# Patient Record
Sex: MALE | Race: WHITE | NOT HISPANIC OR LATINO
[De-identification: names, ages, dates, MRNs, and addresses within clinical notes are randomized per-mention and may not be internally consistent; named-entity substitution may affect disease eponyms.]

---

## 2017-01-12 ENCOUNTER — APPOINTMENT (OUTPATIENT)
Dept: INTERNAL MEDICINE | Facility: CLINIC | Age: 62
End: 2017-01-12

## 2017-02-20 ENCOUNTER — RX RENEWAL (OUTPATIENT)
Age: 62
End: 2017-02-20

## 2017-04-21 ENCOUNTER — RX RENEWAL (OUTPATIENT)
Age: 62
End: 2017-04-21

## 2017-04-24 ENCOUNTER — APPOINTMENT (OUTPATIENT)
Dept: INTERNAL MEDICINE | Facility: CLINIC | Age: 62
End: 2017-04-24

## 2017-04-24 VITALS
WEIGHT: 194 LBS | SYSTOLIC BLOOD PRESSURE: 130 MMHG | RESPIRATION RATE: 14 BRPM | DIASTOLIC BLOOD PRESSURE: 70 MMHG | HEART RATE: 71 BPM | BODY MASS INDEX: 26.28 KG/M2 | OXYGEN SATURATION: 98 % | HEIGHT: 72 IN

## 2017-04-25 ENCOUNTER — OTHER (OUTPATIENT)
Age: 62
End: 2017-04-25

## 2017-04-25 LAB
ALBUMIN SERPL ELPH-MCNC: 4.7 G/DL
ALP BLD-CCNC: 80 U/L
ALT SERPL-CCNC: 19 U/L
ANION GAP SERPL CALC-SCNC: 15 MMOL/L
AST SERPL-CCNC: 16 U/L
BASOPHILS # BLD AUTO: 0.05 K/UL
BASOPHILS NFR BLD AUTO: 0.4 %
BILIRUB SERPL-MCNC: 0.2 MG/DL
BUN SERPL-MCNC: 24 MG/DL
CALCIUM SERPL-MCNC: 9.7 MG/DL
CHLORIDE SERPL-SCNC: 98 MMOL/L
CHOLEST SERPL-MCNC: 160 MG/DL
CO2 SERPL-SCNC: 27 MMOL/L
CREAT SERPL-MCNC: 0.87 MG/DL
CREAT SPEC-SCNC: 77 MG/DL
EOSINOPHIL # BLD AUTO: 0.55 K/UL
EOSINOPHIL NFR BLD AUTO: 4.7 %
GLUCOSE SERPL-MCNC: 275 MG/DL
HBA1C MFR BLD HPLC: 10.1 %
HCT VFR BLD CALC: 38.8 %
HDLC SERPL-MCNC: 44 MG/DL
HGB BLD-MCNC: 12.7 G/DL
IMM GRANULOCYTES NFR BLD AUTO: 0.3 %
LYMPHOCYTES # BLD AUTO: 3.73 K/UL
LYMPHOCYTES NFR BLD AUTO: 31.9 %
MAN DIFF?: NORMAL
MCHC RBC-ENTMCNC: 26.9 PG
MCHC RBC-ENTMCNC: 32.7 GM/DL
MCV RBC AUTO: 82.2 FL
MICROALBUMIN 24H UR DL<=1MG/L-MCNC: 0.4 MG/DL
MICROALBUMIN/CREAT 24H UR-RTO: 5 UG/MG
MONOCYTES # BLD AUTO: 0.86 K/UL
MONOCYTES NFR BLD AUTO: 7.4 %
NEUTROPHILS # BLD AUTO: 6.47 K/UL
NEUTROPHILS NFR BLD AUTO: 55.3 %
PLATELET # BLD AUTO: 291 K/UL
POTASSIUM SERPL-SCNC: 4.7 MMOL/L
PROT SERPL-MCNC: 7.4 G/DL
RBC # BLD: 4.72 M/UL
RBC # FLD: 14.5 %
SODIUM SERPL-SCNC: 140 MMOL/L
WBC # FLD AUTO: 11.69 K/UL

## 2017-05-24 ENCOUNTER — RX RENEWAL (OUTPATIENT)
Age: 62
End: 2017-05-24

## 2017-06-28 ENCOUNTER — APPOINTMENT (OUTPATIENT)
Dept: GASTROENTEROLOGY | Facility: CLINIC | Age: 62
End: 2017-06-28

## 2017-06-28 VITALS
HEIGHT: 72 IN | BODY MASS INDEX: 26.14 KG/M2 | SYSTOLIC BLOOD PRESSURE: 128 MMHG | DIASTOLIC BLOOD PRESSURE: 82 MMHG | HEART RATE: 83 BPM | OXYGEN SATURATION: 96 % | WEIGHT: 193 LBS

## 2017-06-28 DIAGNOSIS — Z12.12 ENCOUNTER FOR SCREENING FOR MALIGNANT NEOPLASM OF COLON: ICD-10-CM

## 2017-06-28 DIAGNOSIS — Z12.11 ENCOUNTER FOR SCREENING FOR MALIGNANT NEOPLASM OF COLON: ICD-10-CM

## 2017-07-28 ENCOUNTER — RX RENEWAL (OUTPATIENT)
Age: 62
End: 2017-07-28

## 2017-08-10 ENCOUNTER — RX RENEWAL (OUTPATIENT)
Age: 62
End: 2017-08-10

## 2017-08-19 ENCOUNTER — RX RENEWAL (OUTPATIENT)
Age: 62
End: 2017-08-19

## 2017-08-24 ENCOUNTER — RX RENEWAL (OUTPATIENT)
Age: 62
End: 2017-08-24

## 2017-09-12 ENCOUNTER — NON-APPOINTMENT (OUTPATIENT)
Age: 62
End: 2017-09-12

## 2017-09-12 ENCOUNTER — APPOINTMENT (OUTPATIENT)
Dept: INTERNAL MEDICINE | Facility: CLINIC | Age: 62
End: 2017-09-12
Payer: COMMERCIAL

## 2017-09-12 VITALS
RESPIRATION RATE: 14 BRPM | DIASTOLIC BLOOD PRESSURE: 70 MMHG | BODY MASS INDEX: 25.87 KG/M2 | OXYGEN SATURATION: 98 % | SYSTOLIC BLOOD PRESSURE: 124 MMHG | HEART RATE: 104 BPM | WEIGHT: 191 LBS | HEIGHT: 72 IN

## 2017-09-12 DIAGNOSIS — Z11.59 ENCOUNTER FOR SCREENING FOR OTHER VIRAL DISEASES: ICD-10-CM

## 2017-09-12 DIAGNOSIS — F17.200 NICOTINE DEPENDENCE, UNSPECIFIED, UNCOMPLICATED: ICD-10-CM

## 2017-09-12 PROCEDURE — 93000 ELECTROCARDIOGRAM COMPLETE: CPT

## 2017-09-12 PROCEDURE — 99396 PREV VISIT EST AGE 40-64: CPT | Mod: 25

## 2017-09-13 LAB
ALBUMIN SERPL ELPH-MCNC: 4.8 G/DL
ALP BLD-CCNC: 74 U/L
ALT SERPL-CCNC: 22 U/L
ANION GAP SERPL CALC-SCNC: 15 MMOL/L
AST SERPL-CCNC: 25 U/L
BASOPHILS # BLD AUTO: 0.04 K/UL
BASOPHILS NFR BLD AUTO: 0.4 %
BILIRUB SERPL-MCNC: 0.3 MG/DL
BUN SERPL-MCNC: 22 MG/DL
CALCIUM SERPL-MCNC: 10.1 MG/DL
CHLORIDE SERPL-SCNC: 98 MMOL/L
CHOLEST SERPL-MCNC: 132 MG/DL
CO2 SERPL-SCNC: 26 MMOL/L
CREAT SERPL-MCNC: 1.09 MG/DL
EOSINOPHIL # BLD AUTO: 0.18 K/UL
EOSINOPHIL NFR BLD AUTO: 1.6 %
GLUCOSE SERPL-MCNC: 223 MG/DL
HBA1C MFR BLD HPLC: 9 %
HCT VFR BLD CALC: 41.1 %
HCV AB SER QL: NONREACTIVE
HCV S/CO RATIO: 0.12 S/CO
HDLC SERPL-MCNC: 40 MG/DL
HGB BLD-MCNC: 13.3 G/DL
IMM GRANULOCYTES NFR BLD AUTO: 0.3 %
LYMPHOCYTES # BLD AUTO: 2.53 K/UL
LYMPHOCYTES NFR BLD AUTO: 22.3 %
MAN DIFF?: NORMAL
MCHC RBC-ENTMCNC: 26.6 PG
MCHC RBC-ENTMCNC: 32.4 GM/DL
MCV RBC AUTO: 82.2 FL
MONOCYTES # BLD AUTO: 0.62 K/UL
MONOCYTES NFR BLD AUTO: 5.5 %
NEUTROPHILS # BLD AUTO: 7.92 K/UL
NEUTROPHILS NFR BLD AUTO: 69.9 %
PLATELET # BLD AUTO: 325 K/UL
POTASSIUM SERPL-SCNC: 4.9 MMOL/L
PROT SERPL-MCNC: 7.8 G/DL
PSA SERPL-MCNC: 1.06 NG/ML
RBC # BLD: 5 M/UL
RBC # FLD: 14.9 %
SODIUM SERPL-SCNC: 139 MMOL/L
TSH SERPL-ACNC: 2.68 UIU/ML
WBC # FLD AUTO: 11.32 K/UL

## 2017-12-11 ENCOUNTER — OUTPATIENT (OUTPATIENT)
Dept: OUTPATIENT SERVICES | Facility: HOSPITAL | Age: 62
LOS: 1 days | End: 2017-12-11
Payer: COMMERCIAL

## 2017-12-11 ENCOUNTER — RESULT REVIEW (OUTPATIENT)
Age: 62
End: 2017-12-11

## 2017-12-11 ENCOUNTER — TRANSCRIPTION ENCOUNTER (OUTPATIENT)
Age: 62
End: 2017-12-11

## 2017-12-11 ENCOUNTER — APPOINTMENT (OUTPATIENT)
Dept: GASTROENTEROLOGY | Facility: HOSPITAL | Age: 62
End: 2017-12-11

## 2017-12-11 DIAGNOSIS — Z12.11 ENCOUNTER FOR SCREENING FOR MALIGNANT NEOPLASM OF COLON: ICD-10-CM

## 2017-12-11 PROCEDURE — 88305 TISSUE EXAM BY PATHOLOGIST: CPT | Mod: 26

## 2017-12-11 PROCEDURE — 88305 TISSUE EXAM BY PATHOLOGIST: CPT

## 2017-12-11 PROCEDURE — 45380 COLONOSCOPY AND BIOPSY: CPT | Mod: PT

## 2017-12-11 PROCEDURE — 45380 COLONOSCOPY AND BIOPSY: CPT | Mod: 33

## 2017-12-12 LAB — SURGICAL PATHOLOGY FINAL REPORT - CH: SIGNIFICANT CHANGE UP

## 2017-12-29 ENCOUNTER — MEDICATION RENEWAL (OUTPATIENT)
Age: 62
End: 2017-12-29

## 2018-01-08 ENCOUNTER — RX RENEWAL (OUTPATIENT)
Age: 63
End: 2018-01-08

## 2018-01-25 ENCOUNTER — APPOINTMENT (OUTPATIENT)
Dept: DERMATOLOGY | Facility: CLINIC | Age: 63
End: 2018-01-25
Payer: COMMERCIAL

## 2018-01-25 VITALS — DIASTOLIC BLOOD PRESSURE: 66 MMHG | SYSTOLIC BLOOD PRESSURE: 130 MMHG

## 2018-01-25 DIAGNOSIS — L81.4 OTHER MELANIN HYPERPIGMENTATION: ICD-10-CM

## 2018-01-25 DIAGNOSIS — L30.0 NUMMULAR DERMATITIS: ICD-10-CM

## 2018-01-25 PROCEDURE — 99214 OFFICE O/P EST MOD 30 MIN: CPT | Mod: GC

## 2018-01-25 RX ORDER — HALOBETASOL PROPIONATE 0.5 MG/G
0.05 OINTMENT TOPICAL TWICE DAILY
Qty: 1 | Refills: 1 | Status: ACTIVE | COMMUNITY
Start: 2018-01-25 | End: 1900-01-01

## 2018-02-22 ENCOUNTER — NON-APPOINTMENT (OUTPATIENT)
Age: 63
End: 2018-02-22

## 2018-02-22 ENCOUNTER — APPOINTMENT (OUTPATIENT)
Dept: INTERNAL MEDICINE | Facility: CLINIC | Age: 63
End: 2018-02-22
Payer: COMMERCIAL

## 2018-02-22 VITALS
HEIGHT: 72 IN | RESPIRATION RATE: 14 BRPM | OXYGEN SATURATION: 96 % | DIASTOLIC BLOOD PRESSURE: 82 MMHG | BODY MASS INDEX: 27.09 KG/M2 | TEMPERATURE: 98.7 F | HEART RATE: 89 BPM | SYSTOLIC BLOOD PRESSURE: 130 MMHG | WEIGHT: 200 LBS

## 2018-02-22 DIAGNOSIS — Z01.818 ENCOUNTER FOR OTHER PREPROCEDURAL EXAMINATION: ICD-10-CM

## 2018-02-22 PROCEDURE — 93000 ELECTROCARDIOGRAM COMPLETE: CPT

## 2018-02-22 PROCEDURE — 99214 OFFICE O/P EST MOD 30 MIN: CPT | Mod: 25

## 2018-03-07 LAB
ALBUMIN SERPL ELPH-MCNC: 4.3 G/DL
ALP BLD-CCNC: 109 U/L
ALT SERPL-CCNC: 16 U/L
ANION GAP SERPL CALC-SCNC: 15 MMOL/L
AST SERPL-CCNC: 19 U/L
BASOPHILS # BLD AUTO: 0.06 K/UL
BASOPHILS NFR BLD AUTO: 0.6 %
BILIRUB SERPL-MCNC: 0.4 MG/DL
BUN SERPL-MCNC: 18 MG/DL
CALCIUM SERPL-MCNC: 9.9 MG/DL
CHLORIDE SERPL-SCNC: 99 MMOL/L
CHOLEST SERPL-MCNC: 134 MG/DL
CHOLEST/HDLC SERPL: 4.1 RATIO
CO2 SERPL-SCNC: 28 MMOL/L
CREAT SERPL-MCNC: 0.87 MG/DL
EOSINOPHIL # BLD AUTO: 0.39 K/UL
EOSINOPHIL NFR BLD AUTO: 4 %
GLUCOSE SERPL-MCNC: 174 MG/DL
HBA1C MFR BLD HPLC: 10.5 %
HCT VFR BLD CALC: 41.3 %
HDLC SERPL-MCNC: 33 MG/DL
HGB BLD-MCNC: 12.7 G/DL
IMM GRANULOCYTES NFR BLD AUTO: 0.5 %
LDLC SERPL CALC-MCNC: 62 MG/DL
LYMPHOCYTES # BLD AUTO: 2.85 K/UL
LYMPHOCYTES NFR BLD AUTO: 29.5 %
MAN DIFF?: NORMAL
MCHC RBC-ENTMCNC: 25.2 PG
MCHC RBC-ENTMCNC: 30.8 GM/DL
MCV RBC AUTO: 82.1 FL
MONOCYTES # BLD AUTO: 0.9 K/UL
MONOCYTES NFR BLD AUTO: 9.3 %
NEUTROPHILS # BLD AUTO: 5.4 K/UL
NEUTROPHILS NFR BLD AUTO: 56.1 %
PLATELET # BLD AUTO: 320 K/UL
POTASSIUM SERPL-SCNC: 4.8 MMOL/L
PROT SERPL-MCNC: 7.2 G/DL
RBC # BLD: 5.03 M/UL
RBC # FLD: 14 %
SODIUM SERPL-SCNC: 142 MMOL/L
TRIGL SERPL-MCNC: 196 MG/DL
WBC # FLD AUTO: 9.65 K/UL

## 2018-03-28 ENCOUNTER — RX RENEWAL (OUTPATIENT)
Age: 63
End: 2018-03-28

## 2018-06-21 ENCOUNTER — RX RENEWAL (OUTPATIENT)
Age: 63
End: 2018-06-21

## 2018-06-29 ENCOUNTER — RX RENEWAL (OUTPATIENT)
Age: 63
End: 2018-06-29

## 2018-07-09 ENCOUNTER — APPOINTMENT (OUTPATIENT)
Dept: INTERNAL MEDICINE | Facility: CLINIC | Age: 63
End: 2018-07-09

## 2018-07-23 ENCOUNTER — RX RENEWAL (OUTPATIENT)
Age: 63
End: 2018-07-23

## 2018-07-25 ENCOUNTER — RX RENEWAL (OUTPATIENT)
Age: 63
End: 2018-07-25

## 2018-08-09 ENCOUNTER — APPOINTMENT (OUTPATIENT)
Dept: INTERNAL MEDICINE | Facility: CLINIC | Age: 63
End: 2018-08-09
Payer: COMMERCIAL

## 2018-08-09 VITALS
HEIGHT: 72 IN | SYSTOLIC BLOOD PRESSURE: 120 MMHG | DIASTOLIC BLOOD PRESSURE: 80 MMHG | RESPIRATION RATE: 14 BRPM | OXYGEN SATURATION: 98 % | WEIGHT: 186 LBS | TEMPERATURE: 98 F | BODY MASS INDEX: 25.19 KG/M2 | HEART RATE: 81 BPM

## 2018-08-09 PROCEDURE — 99214 OFFICE O/P EST MOD 30 MIN: CPT

## 2018-08-09 NOTE — ASSESSMENT
[FreeTextEntry1] : HTN: Continue ramipril. \par HLD: Continue atorvastatin. \par DM2: Continue current meds. Check labs. \par

## 2018-08-09 NOTE — PHYSICAL EXAM
[No Acute Distress] : no acute distress [Normal Sclera/Conjunctiva] : normal sclera/conjunctiva [PERRL] : pupils equal round and reactive to light [EOMI] : extraocular movements intact [No Respiratory Distress] : no respiratory distress  [Clear to Auscultation] : lungs were clear to auscultation bilaterally [No Accessory Muscle Use] : no accessory muscle use [Normal Rate] : normal rate  [Regular Rhythm] : with a regular rhythm [Normal S1, S2] : normal S1 and S2 [No Murmur] : no murmur heard [Soft] : abdomen soft [Non Tender] : non-tender [Normal Bowel Sounds] : normal bowel sounds [Normal Posterior Cervical Nodes] : no posterior cervical lymphadenopathy [Normal Anterior Cervical Nodes] : no anterior cervical lymphadenopathy

## 2018-08-09 NOTE — REVIEW OF SYSTEMS
[Fever] : no fever [Chills] : no chills [Night Sweats] : no night sweats [Nasal Discharge] : no nasal discharge [Sore Throat] : no sore throat [Chest Pain] : no chest pain [Palpitations] : no palpitations [Lower Ext Edema] : no lower extremity edema [Shortness Of Breath] : no shortness of breath [Wheezing] : no wheezing [Cough] : no cough [Dyspnea on Exertion] : not dyspnea on exertion [Abdominal Pain] : no abdominal pain [Nausea] : no nausea [Constipation] : no constipation [Diarrhea] : no diarrhea [Vomiting] : no vomiting [Dysuria] : no dysuria

## 2018-08-09 NOTE — HISTORY OF PRESENT ILLNESS
[Diabetes Mellitus] : Diabetes Mellitus [Hyperlipidemia] : Hyperlipidemia [Hypertension] : Hypertension [No episodes] : No hypoglycemic episodes since the last visit. [Fasting:  ___] : Fasting Blood Sugar: [unfilled] mg/dL [Most Recent A1C: ___] : Most recent A1C was [unfilled] [Target A1C:  ___] : Target A1C is [unfilled] [Target goal not met] : A1C target goal not met [<130/90] : Target blood pressure is  <130/90 [Target goal met] : BP target goal met [Stable] : Patient is stable [Low Intensity Therapy] : Patient is currently on low intensity statin  therapy

## 2018-08-13 LAB
ALBUMIN SERPL ELPH-MCNC: 4.4 G/DL
ALP BLD-CCNC: 54 U/L
ALT SERPL-CCNC: 19 U/L
ANION GAP SERPL CALC-SCNC: 14 MMOL/L
AST SERPL-CCNC: 26 U/L
BASOPHILS # BLD AUTO: 0.06 K/UL
BASOPHILS NFR BLD AUTO: 0.7 %
BILIRUB SERPL-MCNC: 0.3 MG/DL
BUN SERPL-MCNC: 18 MG/DL
CALCIUM SERPL-MCNC: 9.6 MG/DL
CHLORIDE SERPL-SCNC: 100 MMOL/L
CHOLEST SERPL-MCNC: 108 MG/DL
CHOLEST/HDLC SERPL: 3.2 RATIO
CO2 SERPL-SCNC: 27 MMOL/L
CREAT SERPL-MCNC: 0.75 MG/DL
CREAT SPEC-SCNC: 196 MG/DL
EOSINOPHIL # BLD AUTO: 0.44 K/UL
EOSINOPHIL NFR BLD AUTO: 4.9 %
GLUCOSE SERPL-MCNC: 106 MG/DL
HBA1C MFR BLD HPLC: 8 %
HCT VFR BLD CALC: 41 %
HDLC SERPL-MCNC: 34 MG/DL
HGB BLD-MCNC: 12.8 G/DL
IMM GRANULOCYTES NFR BLD AUTO: 0.1 %
LDLC SERPL CALC-MCNC: 61 MG/DL
LYMPHOCYTES # BLD AUTO: 2.02 K/UL
LYMPHOCYTES NFR BLD AUTO: 22.6 %
MAN DIFF?: NORMAL
MCHC RBC-ENTMCNC: 25.3 PG
MCHC RBC-ENTMCNC: 31.2 GM/DL
MCV RBC AUTO: 81 FL
MICROALBUMIN 24H UR DL<=1MG/L-MCNC: <1.2 MG/DL
MICROALBUMIN/CREAT 24H UR-RTO: NORMAL
MONOCYTES # BLD AUTO: 0.72 K/UL
MONOCYTES NFR BLD AUTO: 8.1 %
NEUTROPHILS # BLD AUTO: 5.68 K/UL
NEUTROPHILS NFR BLD AUTO: 63.6 %
PLATELET # BLD AUTO: 317 K/UL
POTASSIUM SERPL-SCNC: 4.7 MMOL/L
PROT SERPL-MCNC: 7.4 G/DL
RBC # BLD: 5.06 M/UL
RBC # FLD: 14.7 %
SODIUM SERPL-SCNC: 141 MMOL/L
TRIGL SERPL-MCNC: 66 MG/DL
WBC # FLD AUTO: 8.93 K/UL

## 2018-08-25 ENCOUNTER — RX RENEWAL (OUTPATIENT)
Age: 63
End: 2018-08-25

## 2018-08-27 ENCOUNTER — RX RENEWAL (OUTPATIENT)
Age: 63
End: 2018-08-27

## 2018-10-11 ENCOUNTER — RESULT REVIEW (OUTPATIENT)
Age: 63
End: 2018-10-11

## 2018-11-13 ENCOUNTER — RX RENEWAL (OUTPATIENT)
Age: 63
End: 2018-11-13

## 2018-12-27 ENCOUNTER — RX RENEWAL (OUTPATIENT)
Age: 63
End: 2018-12-27

## 2019-01-10 ENCOUNTER — APPOINTMENT (OUTPATIENT)
Dept: INTERNAL MEDICINE | Facility: CLINIC | Age: 64
End: 2019-01-10
Payer: COMMERCIAL

## 2019-01-10 VITALS
OXYGEN SATURATION: 97 % | TEMPERATURE: 98.8 F | RESPIRATION RATE: 14 BRPM | WEIGHT: 195 LBS | HEIGHT: 72 IN | DIASTOLIC BLOOD PRESSURE: 80 MMHG | HEART RATE: 81 BPM | BODY MASS INDEX: 26.41 KG/M2 | SYSTOLIC BLOOD PRESSURE: 140 MMHG

## 2019-01-10 DIAGNOSIS — Z23 ENCOUNTER FOR IMMUNIZATION: ICD-10-CM

## 2019-01-10 PROCEDURE — G0009: CPT

## 2019-01-10 PROCEDURE — 99214 OFFICE O/P EST MOD 30 MIN: CPT | Mod: 25

## 2019-01-10 PROCEDURE — 90670 PCV13 VACCINE IM: CPT

## 2019-01-10 NOTE — ASSESSMENT
[FreeTextEntry1] : Cellulitis: start ceftin BID. \par HTN: Continue ramipril. \par HLD: Continue atrovastatin. \par DM2: Continue lantus, metformin. Check labs. \par

## 2019-01-10 NOTE — REVIEW OF SYSTEMS
[Fever] : no fever [Chills] : no chills [Night Sweats] : no night sweats [Earache] : no earache [Nasal Discharge] : no nasal discharge [Sore Throat] : no sore throat [Chest Pain] : no chest pain [Palpitations] : no palpitations [Lower Ext Edema] : no lower extremity edema [Shortness Of Breath] : no shortness of breath [Wheezing] : no wheezing [Cough] : no cough [Dyspnea on Exertion] : not dyspnea on exertion [Abdominal Pain] : no abdominal pain [Nausea] : no nausea [Constipation] : no constipation [Diarrhea] : no diarrhea [Vomiting] : no vomiting [Dysuria] : no dysuria

## 2019-01-10 NOTE — HISTORY OF PRESENT ILLNESS
[Initial Evaluation] : an initial evaluation of [Skin Pain] : skin pain [Skin Redness] : skin redness [Skin Warmth] : skin warmth [Currently Experiencing] : The patient is currently experiencing symptoms. [Bilateral Abdomen] : abdomen [Gradual] : gradual [___ Day(s) Ago] : [unfilled] day(s) ago [Minor Skin Trauma] : minor skin trauma [Constant] : constantly [Moderate] : moderate in severity [Unchanged] : unchanged [Skin Friction] : skin friction [FreeTextEntry8] : 64M presents for follow-up of HTN, HLD, DM2 and for rash for 10 days. PHI for rash documented below. \par  [Fever] : no fever [Chills] : no chills [Serosanguineous Drainage] : no serosanguineous drainage [Purulent Drainage] : no purulent drainage [Localized Edema] : no localized edema [Fatigue] : no fatigue

## 2019-01-14 LAB
ALBUMIN SERPL ELPH-MCNC: 4.4 G/DL
ALP BLD-CCNC: 93 U/L
ALT SERPL-CCNC: 17 U/L
ANION GAP SERPL CALC-SCNC: 12 MMOL/L
AST SERPL-CCNC: 15 U/L
BASOPHILS # BLD AUTO: 0.07 K/UL
BASOPHILS NFR BLD AUTO: 0.6 %
BILIRUB SERPL-MCNC: <0.2 MG/DL
BUN SERPL-MCNC: 14 MG/DL
CALCIUM SERPL-MCNC: 9.7 MG/DL
CHLORIDE SERPL-SCNC: 99 MMOL/L
CHOLEST SERPL-MCNC: 117 MG/DL
CHOLEST/HDLC SERPL: 3.2 RATIO
CO2 SERPL-SCNC: 26 MMOL/L
CREAT SERPL-MCNC: 0.81 MG/DL
CREAT SPEC-SCNC: 82 MG/DL
EOSINOPHIL # BLD AUTO: 0.46 K/UL
EOSINOPHIL NFR BLD AUTO: 3.8 %
GLUCOSE SERPL-MCNC: 243 MG/DL
HBA1C MFR BLD HPLC: 10.5 %
HCT VFR BLD CALC: 41.5 %
HDLC SERPL-MCNC: 37 MG/DL
HGB BLD-MCNC: 13 G/DL
IMM GRANULOCYTES NFR BLD AUTO: 0.4 %
LDLC SERPL CALC-MCNC: 66 MG/DL
LYMPHOCYTES # BLD AUTO: 2.29 K/UL
LYMPHOCYTES NFR BLD AUTO: 18.9 %
MAN DIFF?: NORMAL
MCHC RBC-ENTMCNC: 25.6 PG
MCHC RBC-ENTMCNC: 31.3 GM/DL
MCV RBC AUTO: 81.9 FL
MICROALBUMIN 24H UR DL<=1MG/L-MCNC: 1.7 MG/DL
MICROALBUMIN/CREAT 24H UR-RTO: 21 MG/G
MONOCYTES # BLD AUTO: 1.06 K/UL
MONOCYTES NFR BLD AUTO: 8.7 %
NEUTROPHILS # BLD AUTO: 8.21 K/UL
NEUTROPHILS NFR BLD AUTO: 67.6 %
PLATELET # BLD AUTO: 348 K/UL
POTASSIUM SERPL-SCNC: 4.6 MMOL/L
PROT SERPL-MCNC: 7.6 G/DL
RBC # BLD: 5.07 M/UL
RBC # FLD: 14 %
SODIUM SERPL-SCNC: 137 MMOL/L
TRIGL SERPL-MCNC: 72 MG/DL
WBC # FLD AUTO: 12.14 K/UL

## 2019-01-16 ENCOUNTER — MEDICATION RENEWAL (OUTPATIENT)
Age: 64
End: 2019-01-16

## 2019-01-24 ENCOUNTER — APPOINTMENT (OUTPATIENT)
Dept: INTERNAL MEDICINE | Facility: CLINIC | Age: 64
End: 2019-01-24

## 2019-03-25 ENCOUNTER — MEDICATION RENEWAL (OUTPATIENT)
Age: 64
End: 2019-03-25

## 2019-03-26 ENCOUNTER — APPOINTMENT (OUTPATIENT)
Dept: INTERNAL MEDICINE | Facility: CLINIC | Age: 64
End: 2019-03-26
Payer: MEDICARE

## 2019-03-26 VITALS
DIASTOLIC BLOOD PRESSURE: 78 MMHG | HEART RATE: 64 BPM | WEIGHT: 194 LBS | HEIGHT: 72 IN | SYSTOLIC BLOOD PRESSURE: 142 MMHG | OXYGEN SATURATION: 96 % | BODY MASS INDEX: 26.28 KG/M2 | TEMPERATURE: 98.8 F | RESPIRATION RATE: 14 BRPM

## 2019-03-26 DIAGNOSIS — L03.311 CELLULITIS OF ABDOMINAL WALL: ICD-10-CM

## 2019-03-26 PROCEDURE — 99214 OFFICE O/P EST MOD 30 MIN: CPT

## 2019-03-26 RX ORDER — CEFUROXIME AXETIL 500 MG/1
500 TABLET ORAL
Qty: 14 | Refills: 0 | Status: COMPLETED | COMMUNITY
Start: 2019-01-10 | End: 2019-03-26

## 2019-03-26 NOTE — ASSESSMENT
[FreeTextEntry1] : HLD: Cont atorvastatin. Check lipids. \par DM2: Continue onglyza, metformin, lantus. Check a1c. \par HTN: Continue ramipril.

## 2019-03-26 NOTE — HISTORY OF PRESENT ILLNESS
[Diabetes Mellitus] : Diabetes Mellitus [Hyperlipidemia] : Hyperlipidemia [Hypertension] : Hypertension [Patient was last seen on ___] : Patient was last seen on [unfilled] [FreeTextEntry6] : Cellulitis resolved. [No episodes] : No hypoglycemic episodes since the last visit. [Fasting:  ___] : Fasting Blood Sugar: [unfilled] mg/dL [] : sometimes in range [Most Recent A1C: ___] : Most recent A1C was [unfilled] [Target A1C:  ___] : Target A1C is [unfilled] [<130/90] : Target blood pressure is  <130/90 [Near target goal] : BP near target goal [Stable] : Patient is stable [Low Intensity Therapy] : Patient is currently on low intensity statin  therapy

## 2019-03-28 ENCOUNTER — MEDICATION RENEWAL (OUTPATIENT)
Age: 64
End: 2019-03-28

## 2019-03-28 LAB
ALBUMIN SERPL ELPH-MCNC: 4.6 G/DL
ALP BLD-CCNC: 82 U/L
ALT SERPL-CCNC: 19 U/L
ANION GAP SERPL CALC-SCNC: 14 MMOL/L
AST SERPL-CCNC: 17 U/L
BASOPHILS # BLD AUTO: 0.1 K/UL
BASOPHILS NFR BLD AUTO: 0.9 %
BILIRUB SERPL-MCNC: 0.2 MG/DL
BUN SERPL-MCNC: 17 MG/DL
CALCIUM SERPL-MCNC: 9.7 MG/DL
CHLORIDE SERPL-SCNC: 96 MMOL/L
CO2 SERPL-SCNC: 28 MMOL/L
CREAT SERPL-MCNC: 0.7 MG/DL
EOSINOPHIL # BLD AUTO: 0.34 K/UL
EOSINOPHIL NFR BLD AUTO: 3.1 %
ESTIMATED AVERAGE GLUCOSE: 237 MG/DL
GLUCOSE SERPL-MCNC: 196 MG/DL
HBA1C MFR BLD HPLC: 9.9 %
HCT VFR BLD CALC: 39.9 %
HGB BLD-MCNC: 12.4 G/DL
IMM GRANULOCYTES NFR BLD AUTO: 0.5 %
LYMPHOCYTES # BLD AUTO: 2.31 K/UL
LYMPHOCYTES NFR BLD AUTO: 20.8 %
MAN DIFF?: NORMAL
MCHC RBC-ENTMCNC: 25.4 PG
MCHC RBC-ENTMCNC: 31.1 GM/DL
MCV RBC AUTO: 81.6 FL
MONOCYTES # BLD AUTO: 0.77 K/UL
MONOCYTES NFR BLD AUTO: 6.9 %
NEUTROPHILS # BLD AUTO: 7.51 K/UL
NEUTROPHILS NFR BLD AUTO: 67.8 %
PLATELET # BLD AUTO: 327 K/UL
POTASSIUM SERPL-SCNC: 4.6 MMOL/L
PROT SERPL-MCNC: 7.4 G/DL
RBC # BLD: 4.89 M/UL
RBC # FLD: 13.7 %
SODIUM SERPL-SCNC: 138 MMOL/L
WBC # FLD AUTO: 11.08 K/UL

## 2019-05-20 ENCOUNTER — RX RENEWAL (OUTPATIENT)
Age: 64
End: 2019-05-20

## 2019-05-20 LAB
BASOPHILS # BLD AUTO: 0.08 K/UL
BASOPHILS NFR BLD AUTO: 0.7 %
EOSINOPHIL # BLD AUTO: 0.3 K/UL
EOSINOPHIL NFR BLD AUTO: 2.8 %
FERRITIN SERPL-MCNC: 57 NG/ML
FOLATE SERPL-MCNC: >20 NG/ML
HCT VFR BLD CALC: 40.6 %
HGB BLD-MCNC: 12.5 G/DL
IMM GRANULOCYTES NFR BLD AUTO: 0.5 %
IRON SATN MFR SERPL: 14 %
IRON SERPL-MCNC: 57 UG/DL
LYMPHOCYTES # BLD AUTO: 2.34 K/UL
LYMPHOCYTES NFR BLD AUTO: 21.8 %
MAN DIFF?: NORMAL
MCHC RBC-ENTMCNC: 25.9 PG
MCHC RBC-ENTMCNC: 30.8 GM/DL
MCV RBC AUTO: 84.1 FL
MONOCYTES # BLD AUTO: 0.77 K/UL
MONOCYTES NFR BLD AUTO: 7.2 %
NEUTROPHILS # BLD AUTO: 7.21 K/UL
NEUTROPHILS NFR BLD AUTO: 67 %
PLATELET # BLD AUTO: 315 K/UL
RBC # BLD: 4.83 M/UL
RBC # FLD: 14.3 %
TIBC SERPL-MCNC: 399 UG/DL
UIBC SERPL-MCNC: 342 UG/DL
VIT B12 SERPL-MCNC: 613 PG/ML
WBC # FLD AUTO: 10.75 K/UL

## 2019-06-03 ENCOUNTER — RX RENEWAL (OUTPATIENT)
Age: 64
End: 2019-06-03

## 2019-08-18 ENCOUNTER — TRANSCRIPTION ENCOUNTER (OUTPATIENT)
Age: 64
End: 2019-08-18

## 2019-09-20 ENCOUNTER — RX RENEWAL (OUTPATIENT)
Age: 64
End: 2019-09-20

## 2019-09-21 ENCOUNTER — MEDICATION RENEWAL (OUTPATIENT)
Age: 64
End: 2019-09-21

## 2019-10-28 ENCOUNTER — RX RENEWAL (OUTPATIENT)
Age: 64
End: 2019-10-28

## 2019-10-30 ENCOUNTER — MEDICATION RENEWAL (OUTPATIENT)
Age: 64
End: 2019-10-30

## 2019-11-18 ENCOUNTER — APPOINTMENT (OUTPATIENT)
Dept: INTERNAL MEDICINE | Facility: CLINIC | Age: 64
End: 2019-11-18
Payer: MEDICARE

## 2019-11-18 VITALS
TEMPERATURE: 98 F | SYSTOLIC BLOOD PRESSURE: 140 MMHG | DIASTOLIC BLOOD PRESSURE: 80 MMHG | RESPIRATION RATE: 14 BRPM | HEIGHT: 72 IN | HEART RATE: 74 BPM | OXYGEN SATURATION: 96 %

## 2019-11-18 DIAGNOSIS — Z12.2 ENCOUNTER FOR SCREENING FOR MALIGNANT NEOPLASM OF RESPIRATORY ORGANS: ICD-10-CM

## 2019-11-18 PROCEDURE — G0438: CPT

## 2019-11-18 PROCEDURE — 93000 ELECTROCARDIOGRAM COMPLETE: CPT

## 2019-11-18 PROCEDURE — G0403: CPT

## 2019-11-18 PROCEDURE — G0402 INITIAL PREVENTIVE EXAM: CPT

## 2019-11-18 PROCEDURE — 36415 COLL VENOUS BLD VENIPUNCTURE: CPT

## 2019-11-19 NOTE — REVIEW OF SYSTEMS
[Fever] : no fever [Chills] : no chills [Discharge] : no discharge [Night Sweats] : no night sweats [Vision Problems] : no vision problems [Itching] : no itching [Earache] : no earache [Nasal Discharge] : no nasal discharge [Sore Throat] : no sore throat [Palpitations] : no palpitations [Chest Pain] : no chest pain [Shortness Of Breath] : no shortness of breath [Lower Ext Edema] : no lower extremity edema [Wheezing] : no wheezing [Dyspnea on Exertion] : not dyspnea on exertion [Cough] : no cough [Constipation] : no constipation [Abdominal Pain] : no abdominal pain [Nausea] : no nausea [Vomiting] : no vomiting [Diarrhea] : no diarrhea [Dysuria] : no dysuria [Muscle Weakness] : no muscle weakness [Muscle Pain] : no muscle pain [Mole Changes] : no mole changes [Headache] : no headache [Skin Rash] : no skin rash [Dizziness] : no dizziness [Confusion] : no confusion [Suicidal] : not suicidal [Anxiety] : no anxiety [Depression] : no depression [Easy Bleeding] : no easy bleeding [Easy Bruising] : no easy bruising [Swollen Glands] : no swollen glands

## 2019-11-19 NOTE — HEALTH RISK ASSESSMENT
[Good] : ~his/her~ current health as good [Yes] : Yes [Monthly or less (1 pt)] : Monthly or less (1 point) [1 or 2 (0 pts)] : 1 or 2 (0 points) [Never (0 pts)] : Never (0 points) [0] : 2) Feeling down, depressed, or hopeless: Not at all (0) [No Retinopathy] : No retinopathy [Fully functional (bathing, dressing, toileting, transferring, walking, feeding)] : Fully functional (bathing, dressing, toileting, transferring, walking, feeding) [] :  [Fully functional (using the telephone, shopping, preparing meals, housekeeping, doing laundry, using] : Fully functional and needs no help or supervision to perform IADLs (using the telephone, shopping, preparing meals, housekeeping, doing laundry, using transportation, managing medications and managing finances) [Carbon Monoxide Detector] : carbon monoxide detector [Smoke Detector] : smoke detector [Seat Belt] :  uses seat belt [With Patient/Caregiver] : With Patient/Caregiver [Name: ___] : Health Care Proxy's Name: [unfilled]  [Designated Healthcare Proxy] : Designated healthcare proxy [Relationship: ___] : Relationship: [unfilled] [Aggressive treatment] : aggressive treatment [] : No [[id number]] : 0 [EyeExamDate] : 05/19 [Reports changes in hearing] : Reports no changes in hearing [Change in mental status noted] : No change in mental status noted [Reports changes in vision] : Reports no changes in vision [AdvancecareDate] : 11/19

## 2019-11-19 NOTE — ASSESSMENT
[FreeTextEntry1] : HTN, HLD: Continue atorvastatin, ramipril. \par DM2: On onglyza, lantus, metformin. Check A1c, microalb/cr. \par HCM: Up to date on pneumonia vaccine, colonoscopy. Check labs including PSA. Check CT lung ca screening. \par

## 2019-11-19 NOTE — PHYSICAL EXAM
[Well Nourished] : well nourished [No Acute Distress] : no acute distress [Well Developed] : well developed [Well-Appearing] : well-appearing [Normal Sclera/Conjunctiva] : normal sclera/conjunctiva [PERRL] : pupils equal round and reactive to light [EOMI] : extraocular movements intact [Normal Outer Ear/Nose] : the outer ears and nose were normal in appearance [Normal Oropharynx] : the oropharynx was normal [No Lymphadenopathy] : no lymphadenopathy [Supple] : supple [Thyroid Normal, No Nodules] : the thyroid was normal and there were no nodules present [No Respiratory Distress] : no respiratory distress  [No Accessory Muscle Use] : no accessory muscle use [Clear to Auscultation] : lungs were clear to auscultation bilaterally [Normal Rate] : normal rate  [Regular Rhythm] : with a regular rhythm [Normal S1, S2] : normal S1 and S2 [No Murmur] : no murmur heard [No Edema] : there was no peripheral edema [Soft] : abdomen soft [Non Tender] : non-tender [Non-distended] : non-distended [Normal Bowel Sounds] : normal bowel sounds [Normal Posterior Cervical Nodes] : no posterior cervical lymphadenopathy [Normal Anterior Cervical Nodes] : no anterior cervical lymphadenopathy [No CVA Tenderness] : no CVA  tenderness [No Spinal Tenderness] : no spinal tenderness [No Joint Swelling] : no joint swelling [Grossly Normal Strength/Tone] : grossly normal strength/tone [No Rash] : no rash [Coordination Grossly Intact] : coordination grossly intact [No Focal Deficits] : no focal deficits [Normal Gait] : normal gait [Deep Tendon Reflexes (DTR)] : deep tendon reflexes were 2+ and symmetric [Normal Affect] : the affect was normal [Normal Insight/Judgement] : insight and judgment were intact

## 2019-11-25 LAB
ALBUMIN SERPL ELPH-MCNC: 4.8 G/DL
ALP BLD-CCNC: 75 U/L
ALT SERPL-CCNC: 16 U/L
ANION GAP SERPL CALC-SCNC: 15 MMOL/L
AST SERPL-CCNC: 15 U/L
BASOPHILS # BLD AUTO: 0.07 K/UL
BASOPHILS NFR BLD AUTO: 0.6 %
BILIRUB SERPL-MCNC: 0.2 MG/DL
BUN SERPL-MCNC: 12 MG/DL
CALCIUM SERPL-MCNC: 9.7 MG/DL
CHLORIDE SERPL-SCNC: 95 MMOL/L
CHOLEST SERPL-MCNC: 119 MG/DL
CHOLEST/HDLC SERPL: 3.1 RATIO
CO2 SERPL-SCNC: 28 MMOL/L
CREAT SERPL-MCNC: 0.68 MG/DL
CREAT SPEC-SCNC: 92 MG/DL
EOSINOPHIL # BLD AUTO: 0.35 K/UL
EOSINOPHIL NFR BLD AUTO: 3.1 %
ESTIMATED AVERAGE GLUCOSE: 263 MG/DL
FERRITIN SERPL-MCNC: 43 NG/ML
FOLATE SERPL-MCNC: >20 NG/ML
GLUCOSE SERPL-MCNC: 188 MG/DL
HBA1C MFR BLD HPLC: 10.8 %
HCT VFR BLD CALC: 43.1 %
HDLC SERPL-MCNC: 38 MG/DL
HGB BLD-MCNC: 13.3 G/DL
IMM GRANULOCYTES NFR BLD AUTO: 0.4 %
IRON SATN MFR SERPL: 11 %
IRON SERPL-MCNC: 41 UG/DL
LDLC SERPL CALC-MCNC: 63 MG/DL
LYMPHOCYTES # BLD AUTO: 3.15 K/UL
LYMPHOCYTES NFR BLD AUTO: 27.6 %
MAN DIFF?: NORMAL
MCHC RBC-ENTMCNC: 25.5 PG
MCHC RBC-ENTMCNC: 30.9 GM/DL
MCV RBC AUTO: 82.7 FL
MICROALBUMIN 24H UR DL<=1MG/L-MCNC: 2 MG/DL
MICROALBUMIN/CREAT 24H UR-RTO: 22 MG/G
MONOCYTES # BLD AUTO: 0.9 K/UL
MONOCYTES NFR BLD AUTO: 7.9 %
NEUTROPHILS # BLD AUTO: 6.9 K/UL
NEUTROPHILS NFR BLD AUTO: 60.4 %
PLATELET # BLD AUTO: 289 K/UL
POTASSIUM SERPL-SCNC: 4.5 MMOL/L
PROT SERPL-MCNC: 7.4 G/DL
PSA SERPL-MCNC: 0.87 NG/ML
RBC # BLD: 5.21 M/UL
RBC # FLD: 14.1 %
SODIUM SERPL-SCNC: 138 MMOL/L
TIBC SERPL-MCNC: 381 UG/DL
TRIGL SERPL-MCNC: 88 MG/DL
TSH SERPL-ACNC: 2.94 UIU/ML
UIBC SERPL-MCNC: 340 UG/DL
VIT B12 SERPL-MCNC: 678 PG/ML
WBC # FLD AUTO: 11.41 K/UL

## 2019-12-03 ENCOUNTER — FORM ENCOUNTER (OUTPATIENT)
Age: 64
End: 2019-12-03

## 2019-12-04 ENCOUNTER — OUTPATIENT (OUTPATIENT)
Dept: OUTPATIENT SERVICES | Facility: HOSPITAL | Age: 64
LOS: 1 days | End: 2019-12-04
Payer: MEDICARE

## 2019-12-04 ENCOUNTER — APPOINTMENT (OUTPATIENT)
Dept: CT IMAGING | Facility: CLINIC | Age: 64
End: 2019-12-04
Payer: MEDICARE

## 2019-12-04 VITALS — WEIGHT: 190 LBS | BODY MASS INDEX: 25.73 KG/M2 | HEIGHT: 72 IN

## 2019-12-04 DIAGNOSIS — Z12.2 ENCOUNTER FOR SCREENING FOR MALIGNANT NEOPLASM OF RESPIRATORY ORGANS: ICD-10-CM

## 2019-12-04 PROCEDURE — G0296 VISIT TO DETERM LDCT ELIG: CPT

## 2019-12-04 PROCEDURE — G0297: CPT | Mod: 26

## 2019-12-04 PROCEDURE — G0297: CPT

## 2019-12-04 NOTE — PLAN
[FreeTextEntry1] : - Low Dose CT chest for lung cancer screening.\par \par - Encouraged continued smoking abstinence\par \par Engaged in share decision making with Mr. GONSALEZ.  Answered all questions.  He verbalized understanding and agreement.  He knows to call back with any questions or concerns.\par

## 2019-12-04 NOTE — HISTORY OF PRESENT ILLNESS
[TextBox_13] : Referred by Dr. Jefferson Bruno.\par \par Mr. GONSALEZ is a 64 year old male with a history of HTN and high cholesterol.\par \par He was seen in the office today for review of eligibility for, as well as, Low-Dose CT lung cancer screening.  Reviewed and confirmed that the patient meets screening eligibility criteria:\par \par 64 years old \par \par Smoking Status: Former smoker\par \par Number of pack(s) per day: 1\par Number of years smoked: 40\par Number of pack years smokin\par \par Number of years since quitting smokin\par Quit year: \par \par Mr. GONSALEZ denies any symptoms of lung cancer, including new cough, change in cough, hemoptysis, and unintentional weight loss.\par \par Mr. GONSALEZ denies any personal history of lung cancer.  No lung cancer in a first degree relative.  Denies any history of lung disease or any history of occupational exposures.

## 2019-12-04 NOTE — REASON FOR VISIT
[Review of Eligibility] : review of eligibility [Initial Evaluation] : an initial evaluation visit [Face-to-Face Visit] : face-to-face visit

## 2019-12-10 ENCOUNTER — MEDICATION RENEWAL (OUTPATIENT)
Age: 64
End: 2019-12-10

## 2019-12-27 ENCOUNTER — APPOINTMENT (OUTPATIENT)
Dept: THORACIC SURGERY | Facility: CLINIC | Age: 64
End: 2019-12-27
Payer: MEDICARE

## 2019-12-27 VITALS
RESPIRATION RATE: 15 BRPM | TEMPERATURE: 97.9 F | HEART RATE: 80 BPM | WEIGHT: 190 LBS | HEIGHT: 72 IN | BODY MASS INDEX: 25.73 KG/M2 | OXYGEN SATURATION: 99 % | SYSTOLIC BLOOD PRESSURE: 173 MMHG | DIASTOLIC BLOOD PRESSURE: 82 MMHG

## 2019-12-27 DIAGNOSIS — Z87.891 PERSONAL HISTORY OF NICOTINE DEPENDENCE: ICD-10-CM

## 2019-12-27 PROCEDURE — 99205 OFFICE O/P NEW HI 60 MIN: CPT

## 2019-12-27 RX ORDER — SODIUM SULFATE, POTASSIUM SULFATE, MAGNESIUM SULFATE 17.5; 3.13; 1.6 G/ML; G/ML; G/ML
17.5-3.13-1.6 SOLUTION, CONCENTRATE ORAL
Qty: 1 | Refills: 0 | Status: DISCONTINUED | COMMUNITY
Start: 2017-06-28 | End: 2019-12-27

## 2019-12-27 RX ORDER — PEN NEEDLE, DIABETIC 29 G X1/2"
31G X 8 MM NEEDLE, DISPOSABLE MISCELLANEOUS
Qty: 1 | Refills: 1 | Status: DISCONTINUED | COMMUNITY
Start: 2018-08-25 | End: 2019-12-27

## 2019-12-27 NOTE — HISTORY OF PRESENT ILLNESS
[FreeTextEntry1] : Ms. Rico is a 64 year old male who presents today for evaluation of lung mass, referred by PCP Dr. Bruno.  He is a former smoker (40 years, 1-2 PPD, Quit 2013) with a history of HTN, HLD, DM.  He recently underwent routine CT Chest as part of a lung cancer screening due to smoking history, no prior scans available for comparison. PET scan is scheduled on Sunday 12/29/2019.\par \par CT Chest on 12/4/19 reveals:\par - Dominant lobulated RML 1 x 1.2 cm nodule\par - MYLA mixed solid and groundglass opacity, 1 cm\par - MYLA 4 mm peripheral nodule\par - RUL 2 mm nodule\par - RUL 4 mm nodule\par - No enlarged axillary, mediastinal or hilar lymph nodes\par \par He denies chest pain, shortness of breath, cough, hemoptysis, fever, palpitations, syncope, URI or recent illness.\par \par \par

## 2019-12-27 NOTE — CONSULT LETTER
[Consult Letter:] : I had the pleasure of evaluating your patient, [unfilled]. [Please see my note below.] : Please see my note below. [Consult Closing:] : Thank you very much for allowing me to participate in the care of this patient.  If you have any questions, please do not hesitate to contact me. [Sincerely,] : Sincerely, [Dear  ___] : Dear  [unfilled], [FreeTextEntry2] : Dr. Jefferson Bruno (PCP/ref)\par  [FreeTextEntry3] : \par \par \par Cody Paz MD, FACS \par Chief, Division of Thoracic Surgery \par Director, Minimally Invasive Thoracic Surgery \par Department of Cardiovascular and Thoracic Surgery \par [facility] \par , Cardiovascular and Thoracic Surgery

## 2019-12-27 NOTE — ASSESSMENT
[FreeTextEntry1] : 64 year old male who presents today for evaluation of lung mass, referred by PCP Dr. Bruno.    He recently underwent routine CT Chest as part of a lung cancer screening due to smoking history, no prior scans available for comparison. PET scan is scheduled on Sunday 12/29/2019.\par \par CT Chest on 12/4/19 reveals:\par - Dominant lobulated RML 1 x 1.2 cm nodule\par - MYLA mixed solid and groundglass opacity, 1 cm\par - MYLA 4 mm peripheral nodule\par - RUL 2 mm nodule\par - RUL 4 mm nodule\par - No enlarged axillary, mediastinal or hilar lymph nodes\par \par I have reviewed the patient's medical records and diagnostic images during the time of this office visit, and I have made the following recommendation: \par 1. Complete PFT's prior to surgery.\par 2. Cardiac Clearance \par 3. Schedule Flex Bronchoscopy,  Right VATS , Lung resection first . Following this 3-4 weeks later schedule Flex Bronchoscopy , Left VATS , Lung resection. The risks, benefits, and alternatives to the procedure and expectations were discussed with the patient at length. All of the patient's questions were answered . He  verbalized understanding, and are in agreement with the above treatment plan.\par 4. Obtain the old films and scans if available.\par \par I personally performed the services described in the documentation, reviewed the documentation recorded by the scribe in my presence and it accurately and completely records my words and actions.\par \par I Kayla Bear RN am scribing for and the presence of Dr. Cody Paz   the following sections , History of present illness , Past medical / surgical /  Family /social history ; review of systems; Vital signs; Physical examination and disposition. “\par \par \par

## 2019-12-27 NOTE — PHYSICAL EXAM
[General Appearance - Alert] : alert [General Appearance - In No Acute Distress] : in no acute distress [Strabismus] : no strabismus was seen [Sclera] : the sclera and conjunctiva were normal [Outer Ear] : the ears and nose were normal in appearance [Hearing Threshold Finger Rub [place]] : hearing was normal [Neck Appearance] : the appearance of the neck was normal [Jugular Venous Distention Increased] : there was no jugular-venous distention [] : no respiratory distress [Respiration, Rhythm And Depth] : normal respiratory rhythm and effort [Auscultation Breath Sounds / Voice Sounds] : lungs were clear to auscultation bilaterally [Heart Rate And Rhythm] : heart rate was normal and rhythm regular [Murmurs] : no murmurs [Heart Sounds] : normal S1 and S2 [Examination Of The Chest] : the chest was normal in appearance [2+] : left 2+ [No Abnormalities] : the abdominal aorta was not enlarged and no bruit was heard [Bowel Sounds] : normal bowel sounds [Abdomen Soft] : soft [Abdomen Tenderness] : non-tender [Cervical Lymph Nodes Enlarged Anterior Bilaterally] : anterior cervical [Cervical Lymph Nodes Enlarged Posterior Bilaterally] : posterior cervical [Supraclavicular Lymph Nodes Enlarged Bilaterally] : supraclavicular [Abnormal Walk] : normal gait [Skin Color & Pigmentation] : normal skin color and pigmentation [No Focal Deficits] : no focal deficits [Skin Turgor] : normal skin turgor [Oriented To Time, Place, And Person] : oriented to person, place, and time [Impaired Insight] : insight and judgment were intact [Right Carotid Bruit] : no bruit heard over the right carotid [Left Carotid Bruit] : no bruit heard over the left carotid [Right Femoral Bruit] : no bruit heard over the right femoral artery [Left Femoral Bruit] : no bruit heard over the left femoral artery

## 2019-12-27 NOTE — REVIEW OF SYSTEMS
[Loss Of Hearing] : hearing loss [Constipation] : constipation [Dry Skin] : dry skin [Negative] : Heme/Lymph [FreeTextEntry4] : Left ear. [FreeTextEntry2] : C/o Poor appetite couple of weeks ago now resolved. [de-identified] : H/o DM Type II

## 2019-12-30 ENCOUNTER — FORM ENCOUNTER (OUTPATIENT)
Age: 64
End: 2019-12-30

## 2019-12-30 ENCOUNTER — MEDICATION RENEWAL (OUTPATIENT)
Age: 64
End: 2019-12-30

## 2019-12-30 ENCOUNTER — RX RENEWAL (OUTPATIENT)
Age: 64
End: 2019-12-30

## 2019-12-30 RX ORDER — LANCETS 30 GAUGE
EACH MISCELLANEOUS
Qty: 1 | Refills: 1 | Status: ACTIVE | COMMUNITY
Start: 2018-08-09 | End: 1900-01-01

## 2019-12-31 ENCOUNTER — OUTPATIENT (OUTPATIENT)
Dept: OUTPATIENT SERVICES | Facility: HOSPITAL | Age: 64
LOS: 1 days | End: 2019-12-31
Payer: MEDICARE

## 2019-12-31 ENCOUNTER — APPOINTMENT (OUTPATIENT)
Dept: NUCLEAR MEDICINE | Facility: CLINIC | Age: 64
End: 2019-12-31
Payer: MEDICARE

## 2019-12-31 DIAGNOSIS — R91.8 OTHER NONSPECIFIC ABNORMAL FINDING OF LUNG FIELD: ICD-10-CM

## 2019-12-31 PROCEDURE — 78815 PET IMAGE W/CT SKULL-THIGH: CPT

## 2019-12-31 PROCEDURE — A9552: CPT

## 2019-12-31 PROCEDURE — 78815 PET IMAGE W/CT SKULL-THIGH: CPT | Mod: 26,PI

## 2020-01-02 ENCOUNTER — TRANSCRIPTION ENCOUNTER (OUTPATIENT)
Age: 65
End: 2020-01-02

## 2020-01-03 ENCOUNTER — APPOINTMENT (OUTPATIENT)
Dept: CARDIOLOGY | Facility: CLINIC | Age: 65
End: 2020-01-03

## 2020-01-04 ENCOUNTER — APPOINTMENT (OUTPATIENT)
Dept: INTERNAL MEDICINE | Facility: CLINIC | Age: 65
End: 2020-01-04
Payer: MEDICARE

## 2020-01-04 VITALS
RESPIRATION RATE: 14 BRPM | HEART RATE: 80 BPM | SYSTOLIC BLOOD PRESSURE: 134 MMHG | OXYGEN SATURATION: 97 % | TEMPERATURE: 98.2 F | DIASTOLIC BLOOD PRESSURE: 80 MMHG | WEIGHT: 194 LBS | BODY MASS INDEX: 26.31 KG/M2

## 2020-01-04 DIAGNOSIS — Z01.818 ENCOUNTER FOR OTHER PREPROCEDURAL EXAMINATION: ICD-10-CM

## 2020-01-04 PROCEDURE — 99214 OFFICE O/P EST MOD 30 MIN: CPT

## 2020-01-04 NOTE — REVIEW OF SYSTEMS
[Fever] : no fever [Chills] : no chills [Night Sweats] : no night sweats [Itching] : no itching [Vision Problems] : no vision problems [Earache] : no earache [Discharge] : no discharge [Nasal Discharge] : no nasal discharge [Sore Throat] : no sore throat [Lower Ext Edema] : no lower extremity edema [Chest Pain] : no chest pain [Palpitations] : no palpitations [Shortness Of Breath] : no shortness of breath [Wheezing] : no wheezing [Cough] : no cough [Abdominal Pain] : no abdominal pain [Dyspnea on Exertion] : not dyspnea on exertion [Constipation] : no constipation [Nausea] : no nausea [Diarrhea] : no diarrhea [Muscle Weakness] : no muscle weakness [Muscle Pain] : no muscle pain [Dysuria] : no dysuria [Vomiting] : no vomiting [Mole Changes] : no mole changes [Skin Rash] : no skin rash [Easy Bleeding] : no easy bleeding [Confusion] : no confusion [Headache] : no headache [Dizziness] : no dizziness [Easy Bruising] : no easy bruising [Swollen Glands] : no swollen glands

## 2020-01-04 NOTE — HISTORY OF PRESENT ILLNESS
[(Patient denies any chest pain, claudication, dyspnea on exertion, orthopnea, palpitations or syncope)] : Patient denies any chest pain, claudication, dyspnea on exertion, orthopnea, palpitations or syncope [Diabetes] : diabetes [Anti-Platelet Agents: _____] : Anti-Platelet Agents: [unfilled] [Aortic Stenosis] : no aortic stenosis [Atrial Fibrillation] : no atrial fibrillation [Coronary Artery Disease] : no coronary artery disease [Recent Myocardial Infarction] : no recent myocardial infarction [Asthma] : no asthma [Implantable Device/Pacemaker] : no implantable device/pacemaker [COPD] : no COPD [Sleep Apnea] : no sleep apnea [Smoker] : not a smoker [FreeTextEntry3] : Dr Paz [FreeTextEntry2] : 1/2019 [FreeTextEntry1] : Right VATS, lung resection

## 2020-01-04 NOTE — ASSESSMENT
[Prior Cerebrovascular Accident or TIA] : Prior Cerebrovascular Accident or TIA - No (0) [Congestive Heart Failure] : Congestive Heart Failure - No (0) [High Risk Surgery - Intraperitoneal, Intrathoracic or Supringuinal Vascular Procedures] : High Risk Surgery - Intraperitoneal, Intrathoracic or Supringuinal Vascular Procedures - Yes (1) [Ischemic Heart Disease] : Ischemic Heart Disease - No (0) [Creatinine >= 2mg/dL (1 Point)] : Creatinine >= 2mg/dL - No (0) [Insulin-dependent Diabetic (1 point)] : Insulin-dependent Diabetic - Yes (1) [Cardiology consultation] : Cardiology consultation [Patient Requires Further Testing] : Patient requires further testing [2] : 2 , RCRI Class: III, Risk of Post-Op Cardiac Complications: 6.6%, Procedure Risk: Elevated-Risk [Other: _____] : [unfilled] [Modify medications prior to procedure] : Modify medications prior to procedure [As per surgery] : as per surgery [FreeTextEntry7] : hold diabetes medication morning of procedure and take half dose of lantus night before.

## 2020-01-04 NOTE — PLAN
[FreeTextEntry1] : Discussed thyroid US and following up with Dr Dye for possible colonoscopy for PET/CT findings. \par

## 2020-01-04 NOTE — PHYSICAL EXAM
[No Acute Distress] : no acute distress [Normal Sclera/Conjunctiva] : normal sclera/conjunctiva [PERRL] : pupils equal round and reactive to light [EOMI] : extraocular movements intact [No Lymphadenopathy] : no lymphadenopathy [Normal Outer Ear/Nose] : the outer ears and nose were normal in appearance [Normal Oropharynx] : the oropharynx was normal [No Respiratory Distress] : no respiratory distress  [No Accessory Muscle Use] : no accessory muscle use [Supple] : supple [Normal Rate] : normal rate  [Clear to Auscultation] : lungs were clear to auscultation bilaterally [Regular Rhythm] : with a regular rhythm [Soft] : abdomen soft [Normal S1, S2] : normal S1 and S2 [No Edema] : there was no peripheral edema [Normal Bowel Sounds] : normal bowel sounds [Non-distended] : non-distended [Non Tender] : non-tender [No Spinal Tenderness] : no spinal tenderness [Normal Anterior Cervical Nodes] : no anterior cervical lymphadenopathy [Normal Posterior Cervical Nodes] : no posterior cervical lymphadenopathy [No Joint Swelling] : no joint swelling [No Rash] : no rash [Grossly Normal Strength/Tone] : grossly normal strength/tone [Normal Affect] : the affect was normal [Normal Insight/Judgement] : insight and judgment were intact [Normal Gait] : normal gait

## 2020-01-05 ENCOUNTER — FORM ENCOUNTER (OUTPATIENT)
Age: 65
End: 2020-01-05

## 2020-01-06 ENCOUNTER — APPOINTMENT (OUTPATIENT)
Dept: ULTRASOUND IMAGING | Facility: CLINIC | Age: 65
End: 2020-01-06
Payer: MEDICARE

## 2020-01-06 ENCOUNTER — OUTPATIENT (OUTPATIENT)
Dept: OUTPATIENT SERVICES | Facility: HOSPITAL | Age: 65
LOS: 1 days | End: 2020-01-06
Payer: MEDICARE

## 2020-01-06 DIAGNOSIS — E04.1 NONTOXIC SINGLE THYROID NODULE: ICD-10-CM

## 2020-01-06 PROCEDURE — 76536 US EXAM OF HEAD AND NECK: CPT

## 2020-01-06 PROCEDURE — 76536 US EXAM OF HEAD AND NECK: CPT | Mod: 26

## 2020-01-10 ENCOUNTER — OUTPATIENT (OUTPATIENT)
Dept: OUTPATIENT SERVICES | Facility: HOSPITAL | Age: 65
LOS: 1 days | Discharge: HOME | End: 2020-01-10

## 2020-01-10 DIAGNOSIS — C34.90 MALIGNANT NEOPLASM OF UNSPECIFIED PART OF UNSPECIFIED BRONCHUS OR LUNG: ICD-10-CM

## 2020-01-15 ENCOUNTER — APPOINTMENT (OUTPATIENT)
Dept: CARDIOLOGY | Facility: CLINIC | Age: 65
End: 2020-01-15

## 2020-01-16 ENCOUNTER — APPOINTMENT (OUTPATIENT)
Dept: PULMONOLOGY | Facility: CLINIC | Age: 65
End: 2020-01-16

## 2020-01-17 ENCOUNTER — APPOINTMENT (OUTPATIENT)
Dept: HEMATOLOGY ONCOLOGY | Facility: CLINIC | Age: 65
End: 2020-01-17

## 2020-02-20 ENCOUNTER — APPOINTMENT (OUTPATIENT)
Dept: ENDOCRINOLOGY | Facility: CLINIC | Age: 65
End: 2020-02-20
Payer: MEDICARE

## 2020-02-20 VITALS
DIASTOLIC BLOOD PRESSURE: 79 MMHG | HEART RATE: 80 BPM | HEIGHT: 72 IN | SYSTOLIC BLOOD PRESSURE: 168 MMHG | WEIGHT: 195 LBS | BODY MASS INDEX: 26.41 KG/M2 | OXYGEN SATURATION: 95 %

## 2020-02-20 DIAGNOSIS — Z83.3 FAMILY HISTORY OF DIABETES MELLITUS: ICD-10-CM

## 2020-02-20 DIAGNOSIS — Z87.891 PERSONAL HISTORY OF NICOTINE DEPENDENCE: ICD-10-CM

## 2020-02-20 LAB
GLUCOSE BLDC GLUCOMTR-MCNC: 210
HBA1C MFR BLD HPLC: 11.1

## 2020-02-20 PROCEDURE — 83036 HEMOGLOBIN GLYCOSYLATED A1C: CPT | Mod: QW

## 2020-02-20 PROCEDURE — 99205 OFFICE O/P NEW HI 60 MIN: CPT | Mod: 25

## 2020-02-20 PROCEDURE — 36415 COLL VENOUS BLD VENIPUNCTURE: CPT

## 2020-02-20 PROCEDURE — 82962 GLUCOSE BLOOD TEST: CPT

## 2020-02-20 RX ORDER — ALOGLIPTIN 25 MG/1
25 TABLET, FILM COATED ORAL DAILY
Qty: 90 | Refills: 1 | Status: DISCONTINUED | COMMUNITY
Start: 2020-02-13 | End: 2020-02-20

## 2020-02-23 ENCOUNTER — APPOINTMENT (OUTPATIENT)
Dept: ULTRASOUND IMAGING | Facility: CLINIC | Age: 65
End: 2020-02-23

## 2020-02-24 NOTE — HISTORY OF PRESENT ILLNESS
"         Child Psychiatry Note    Reason for Admission: Bradycardia and loss of consciousness   Reason for Consult: Ingestion of Klonopin, SI  Requesting Physician: Dr. Reilly  Supervising Psychiatric Attending: Dr. Arango  Guardian: Parent (mom)  Chief Complaint: \"going downhill since mom went to group home\"    HPI :   Pt is a 14 yo male who presented to the ED after having multiple syncopal episodes yesterday in context of smoking 10 joints and taking a klonopin.  Pt was inconsistent when reporting how much Klonopin he took, he told us he took only 0.25 mg and told others he took 1 mg.  Pt reported that this was not a suicidal attempt and wasn't sure what was going on.  Pt reported that things have been going downhill since his mom went to long-term over the last 3 months.  He has felt increasingly depressed and down, has had difficulty sleeping (not sleeping enough), is feeling hopeless about the future, has difficulty concentrating, decreased appetite, and suicidal thoughts.  Pt reports that he has frequently looked at knives and thought about slitting his wrists or what it would be like if he jumped off a building.  This morning when discussing with the nurse about how he couldn't have any visitors at the bedside besides mom, he stated \"I wish I would have taken 45 pills.\"  He reports that he doesn't plan to act on any of these issues but is thinking about it frequently and doesn't care if he dies.      Since his mom has been gone he has lost about 50 pounds and says he doesn't have an appetite at all.  He has not been attending school for the last month and had previously gotten in trouble at school by yelling at teachers, flipping over desks, etc.  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  He reports he has started to heavily use drugs since mom when to group home, smoking multiple joints daily, drinking up to a half gallon of " "vodka, using mushrooms 5 or 6 times.  He reports he uses drugs as an escape when he can't take how upset or sad he is feeling.  When mom was out of shelter on Jan 9, 2018, he got into an argument with her and ran away for a week.  He began living with mom again and feels safe at home, but doesn't wan to open up to mom.  A few days ago pt was diagnosed with Pneumonia and started on antibiotics.      On Psychiatric review of symptoms, pt reported that in the past he has gone 7 days without needing much sleep, during this time he gets irritable more then normal, has increased goal directed activities (working on multiple projects), and feels very distractible.   He also has had 2 episodes of having AH where he hears a voice talking back to him when but was using marijuana during this week.  No VH.  Is at times paranoid when walking to friends house, will feel like someone is watching him, but again was using marijuana.  Denies OCD symptoms.  Does report a history of trauma in regards to learning of friends who have shot himself, no physical or sexual abuse.  Pt denies purposefully restricting calories or purging.      Objective:  Blood pressure (!) 96/45, pulse (!) 44, temperature 36.4 °C (97.6 °F), resp. rate 16, height 1.727 m (5' 8\"), weight 69.8 kg (153 lb 14.1 oz), SpO2 99 %.      MSE :   Appearance: 16 yo thin male, blond hair spiked up and somewhat unkempt, in hospital attire with some light appearing tattoos or markings over right hand   Behavior: calm, cooperative, pleasant, good eye contact and realtively engaged.  He had some mild psychomotor retardation and was moving slowly at times during interview.     Speech : speech is clear and understandable, decreased rate, decreased volume and monotone   Language: normal vocabulary   Mood: \"down\"   Affect: dysthymic, irritable and mood congruent, at times yelling at mom about frustrations.     Thought Process: moslty linear, not goal directed   Thought Content: + SI " "with specific thoughts of how to end life but no specific current plan.  No HI. No current AH    Attention/Concentration: appeared intact, able to do well in conversation and remember events in life well that he was willing to share   Memory: no gross deficits   Orientation: oriented to person, place, situation   Neurological: degerred   Fund of Knowledge: appropriate given developmental stage   Insight/Judgment: poor / poor    Past Psych Hx:    Pt has seen a school psychologist  3 times in the last couple months.  Has not seen her in over a month as he has been skipping school.    No hospitalizations  No suicide attempts  No past medications                                  Family Psych Hx:   Mom has depression and substance use disorder with Methamphetamine.  Is currently clean per mom.  Dad has been diagnosed with Bipolar disorder and schizophrenia     Social Hx:   Was living with grandpa for 1 month till mom got out of [facility].  Ran away for 7 days, but began to again start living with mom. He feels safe at home and has needs met.  Denies history of abuse and reports childhood was pretty good.  Dad has not been in life.   Going to [employer] in [place], [place]. Was previously doing okay in school but started to fail classes when mom went to [facility].  Police came on a party and pt is not sure if he has charges but may have a court date in March.  See HPI for substance use history.      Drugs/Alcohol Hx:   See HPI.                          MEDICAL HX: I have reviewed all the vitals, labs, notes, records, and the MAR. Findings of possible interest to psychiatry include:   ECG on 1/19 showed sinus bradychardia with qtc of 395.    Blood pressure (!) 96/45, pulse (!) 44, temperature 36.3 °C (97.4 °F), resp. rate 16, height 1.727 m (5' 8\"), weight 69.8 kg (153 lb 14.1 oz), SpO2 99 %.      Medical Hx: Pneumonia 3 days ago, put on Azithromycin and Augmentin  Denies other medical problems  Denies TBI  Denies Hx of " "seizures      Medications:   Current Facility-Administered Medications:   •  0.9 % NaCl with KCl 20 mEq infusion, , Intravenous, Continuous, Lisa Reilly M.D.    Allergies: Ibuprofen and Nsaids    Labs reviewed: No labs available    Imaging: none  Tests (EEG): none    Medical ROS: having little appetite, otherwise medical review of systems was negative      Assessment/Plan:  Pt is a 16 yo male who was experiencing syncope and bradycardia.  Pt took an unknown amount of Klonopin and had smoked 10 joints yesterday morning.  Mom reports he had multiple syncopal episodes and was unconscious for about 15 minutes while waiting for ambulance.  He reports having severe depression over last three months since his mom was put in skilled nursing and has frequent suicidal thoughts, picturing himself cutting his wrists and jumping off a building. Currently is experiencing a major depressive episode, severe with significant risk factors for care for self and safety.  Pt has lost 50 lbs over the last 3 months and has not been attending school for one month.  Pt denies that this was a suicide attempt, however, his story has been inconsistent for how much Klonopin he took and told a nurse when frustrated, \"I wish I would have taken 45 pills.\"   He reports a history of hypomanic symptoms in the past and has a family history of Bipolar Disorder vs Schizophrenia.  He has been using marijuana heavily and multiple other substances including mushrooms in an attempt to escape from difficult emotions and his life situation.  He has not received any psychiatric medications and doesn't have established psychiatric care.  Pt is acutely at high risk for suicide, has severe depressive symptoms, is not able to care for self (loosing 50 lbs and not attending school).  Recommend further psychiatric treatment.      DDX:  - Bipolar II, Current episode depressed  - Unspecified trauma related disorder (rule out PTSD)  - Marijuana use disorder, unspecified " severity    Plan:  - Recommend transfer to acute inpatient psychiatric facility for further treatment  - Defer to [facility] for evaluation and management of psychiatric medications  - Discussed with parents the recommendation for outpatient therapy and psychiatric follow up once pt is discharged from acute psychiatric hospital  - Recommend substance abuse treatment    Medical: as noted under Medical Hx and by Medical Team     Disposition:     Pt needs further treatment and will be transferred to an acute psychiatric hospital   Signing off, reconsult as needed   Thank you for the consult.                                                                                .   [FreeTextEntry1] : Mr. STANLEY GONSALEZ  is a 65 year old male with past medical history of Diabetes Mellitus Type 2,  who presents for management of his diabetes. Patients wife states that he has been non-compliant with diet outside of home for years. He was recently thought to have a lung mass but went for second opinion in [facility] where they found that he has a hamartoma. HE also has a finding of a thyroid nodule on PET scan. He has had high sugars for a very long time. Patient denies any history of diabetic retinopathy, nephropathy or neuropathy. He denies any blurry vision, polyuria, polydipsia and numbness/tingling in the extremities.\par \par \par POCT Glucose: 210 mg/dL\par POCT Hga1c: 11.1 %\par \par \par Diabetes first diagnosed: years ago\par Type:2\par \par Current diabetic regimen:\par Lantus 40 units QHS\par Metformin 2000 mg BID\par Onglyza 5 mg QD\par \par Other relevant medications:\par atorvastatin\par \par Pt does not check his fsg\par \par Hypoglycemia: none\par \par \par Diet:\par Breakfast:milk and cookie\par Lunch:sandwiches \par Dinner:meat, veggies,  starches\par Snacks:candy, coffee with sweetener\par \par Exercise: none\par \par Urine micro:enl  11/2019\par lipid profile: ldl 63  11/2019\par last hba1c:11.1% 2/2020, 10.8% 11/2019\par eye exam: yearly\par diabetic foot exam/podiatry:NA\par \par \par \par \par

## 2020-02-24 NOTE — ASSESSMENT
[FreeTextEntry1] : 1. DM 2- uncontrolled, uncomplicated\par Patient counseled on the importance of diabetic control and complications. Patient's diet and the necessary changes discussed. Patient needs to change his diet. He needs to check his blood sugars. He also needs prandial insulin. His sugars are too high and non-insulin medications will not work.\par \par \par Cont Metformin 1000 mg BID\par Cont Lantus 40 units QHS\par Start HUmalog 5 units TID\par Check fsg QID\par Cont diabetic diet\par Cont exercise\par will check hga1c, microalbumin/cre, lipid, bmp, \par \par 2. HLD- stable\par Well controlled on atorvastatin\par \par 3. Thyroid nodule\par Check Thyroid US\par \par Follow up in 2 weeks\par \par \par

## 2020-02-24 NOTE — CONSULT LETTER
[Dear  ___] : Dear  [unfilled], [Consult Letter:] : I had the pleasure of evaluating your patient, [unfilled]. [Please see my note below.] : Please see my note below. [Consult Closing:] : Thank you very much for allowing me to participate in the care of this patient.  If you have any questions, please do not hesitate to contact me. [Sincerely,] : Sincerely, [FreeTextEntry3] : Michelle Miranda MS. DO.\par Endocrinology\par [address] [place] [address]\[place] [address]\par Tel [phone]\par Fax [phone]\par

## 2020-02-24 NOTE — PHYSICAL EXAM
[Alert] : alert [No Acute Distress] : no acute distress [Well Nourished] : well nourished [Well Developed] : well developed [EOMI] : extra ocular movement intact [Normal Sclera/Conjunctiva] : normal sclera/conjunctiva [No Proptosis] : no proptosis [Normal Oropharynx] : the oropharynx was normal [Thyroid Not Enlarged] : the thyroid was not enlarged [No Respiratory Distress] : no respiratory distress [No Thyroid Nodules] : there were no palpable thyroid nodules [Clear to Auscultation] : lungs were clear to auscultation bilaterally [No Accessory Muscle Use] : no accessory muscle use [Normal Rate] : heart rate was normal  [Normal S1, S2] : normal S1 and S2 [Regular Rhythm] : with a regular rhythm [Pedal Pulses Normal] : the pedal pulses are present [No Edema] : there was no peripheral edema [Normal Bowel Sounds] : normal bowel sounds [Not Tender] : non-tender [Soft] : abdomen soft [Not Distended] : not distended [Post Cervical Nodes] : posterior cervical nodes [Anterior Cervical Nodes] : anterior cervical nodes [Axillary Nodes] : axillary nodes [No Spinal Tenderness] : no spinal tenderness [Spine Straight] : spine straight [No Stigmata of Cushings Syndrome] : no stigmata of cushings syndrome [Normal Gait] : normal gait [Normal Strength/Tone] : muscle strength and tone were normal [No Rash] : no rash [Normal Reflexes] : deep tendon reflexes were 2+ and symmetric [No Tremors] : no tremors [Oriented x3] : oriented to person, place, and time [Right Foot Was Examined] : right foot ~C was examined [Left Foot Was Examined] : left foot ~C was examined [Normal] : normal [2+] : 2+ in the dorsalis pedis [Acanthosis Nigricans] : no acanthosis nigricans [Diminished Throughout Both Feet] : normal tactile sensation with monofilament testing throughout both feet

## 2020-02-25 LAB
ALBUMIN SERPL ELPH-MCNC: 4.9 G/DL
ALP BLD-CCNC: 83 U/L
ALT SERPL-CCNC: 16 U/L
ANION GAP SERPL CALC-SCNC: 12 MMOL/L
AST SERPL-CCNC: 14 U/L
BASOPHILS # BLD AUTO: 0.08 K/UL
BASOPHILS NFR BLD AUTO: 0.8 %
BILIRUB SERPL-MCNC: 0.2 MG/DL
BUN SERPL-MCNC: 13 MG/DL
C PEPTIDE SERPL-MCNC: 2 NG/ML
CALCIUM SERPL-MCNC: 9.5 MG/DL
CHLORIDE SERPL-SCNC: 99 MMOL/L
CHOLEST SERPL-MCNC: 127 MG/DL
CHOLEST/HDLC SERPL: 3.8 RATIO
CO2 SERPL-SCNC: 28 MMOL/L
CREAT SERPL-MCNC: 0.62 MG/DL
CREAT SPEC-SCNC: 110 MG/DL
EOSINOPHIL # BLD AUTO: 0.32 K/UL
EOSINOPHIL NFR BLD AUTO: 3.3 %
ESTIMATED AVERAGE GLUCOSE: 275 MG/DL
GLUCOSE SERPL-MCNC: 230 MG/DL
HBA1C MFR BLD HPLC: 11.2 %
HCT VFR BLD CALC: 42.6 %
HDLC SERPL-MCNC: 34 MG/DL
HGB BLD-MCNC: 13.5 G/DL
IMM GRANULOCYTES NFR BLD AUTO: 0.4 %
LDLC SERPL CALC-MCNC: 60 MG/DL
LYMPHOCYTES # BLD AUTO: 2.71 K/UL
LYMPHOCYTES NFR BLD AUTO: 27.8 %
MAN DIFF?: NORMAL
MCHC RBC-ENTMCNC: 25.9 PG
MCHC RBC-ENTMCNC: 31.7 GM/DL
MCV RBC AUTO: 81.8 FL
MICROALBUMIN 24H UR DL<=1MG/L-MCNC: 2.1 MG/DL
MICROALBUMIN/CREAT 24H UR-RTO: 19 MG/G
MONOCYTES # BLD AUTO: 0.71 K/UL
MONOCYTES NFR BLD AUTO: 7.3 %
NEUTROPHILS # BLD AUTO: 5.9 K/UL
NEUTROPHILS NFR BLD AUTO: 60.4 %
PLATELET # BLD AUTO: 333 K/UL
POTASSIUM SERPL-SCNC: 4.7 MMOL/L
PROT SERPL-MCNC: 7.2 G/DL
RBC # BLD: 5.21 M/UL
RBC # FLD: 13.9 %
SODIUM SERPL-SCNC: 139 MMOL/L
TRIGL SERPL-MCNC: 163 MG/DL
WBC # FLD AUTO: 9.76 K/UL

## 2020-02-27 RX ORDER — FLASH GLUCOSE SCANNING READER
EACH MISCELLANEOUS
Qty: 1 | Refills: 0 | Status: ACTIVE | COMMUNITY
Start: 2020-02-20 | End: 1900-01-01

## 2020-05-28 ENCOUNTER — APPOINTMENT (OUTPATIENT)
Dept: INTERNAL MEDICINE | Facility: CLINIC | Age: 65
End: 2020-05-28
Payer: MEDICARE

## 2020-05-28 VITALS
WEIGHT: 190 LBS | BODY MASS INDEX: 25.73 KG/M2 | HEIGHT: 72 IN | OXYGEN SATURATION: 96 % | HEART RATE: 95 BPM | RESPIRATION RATE: 14 BRPM | SYSTOLIC BLOOD PRESSURE: 140 MMHG | TEMPERATURE: 98.5 F | DIASTOLIC BLOOD PRESSURE: 70 MMHG

## 2020-05-28 PROCEDURE — 99214 OFFICE O/P EST MOD 30 MIN: CPT

## 2020-05-28 NOTE — ASSESSMENT
[FreeTextEntry1] : Lung mass: Following with [facility]. \par Thyroid nodule: For repeat FNA at [facility]. \par DM2: Renew humalog, lantus. Check A1c, microalb/cr. \par HLD: Continue atorvastatin. Check lipids. \par HTN: Continue ramipril. \par

## 2020-05-28 NOTE — HISTORY OF PRESENT ILLNESS
[Diabetes Mellitus] : Diabetes Mellitus [Hypertension] : Hypertension [Hyperlipidemia] : Hyperlipidemia [FreeTextEntry6] : Patient has followed with [facility] for lung mass and thyroid nodule. Has to have repeat FNA for thyroid nodule. Currently reports lower gum pain. Dentist unavailable for evaluation. Denies fever, chills, sore throat.  [Patient was last seen on ___] : Patient was last seen on [unfilled] [No episodes] : No hypoglycemic episodes since the last visit. [Most Recent A1C: ___] : Most recent A1C was [unfilled] [Target goal not met] : BP target goal not met [<130/90] : Target blood pressure is  <130/90 [Stable] : Patient is stable [Low Intensity Therapy] : Patient is currently on low intensity statin  therapy

## 2020-05-28 NOTE — REVIEW OF SYSTEMS
[Fever] : no fever [Chills] : no chills [Night Sweats] : no night sweats [Chest Pain] : no chest pain [Palpitations] : no palpitations [Lower Ext Edema] : no lower extremity edema [Shortness Of Breath] : no shortness of breath [Cough] : no cough [Dyspnea on Exertion] : not dyspnea on exertion [Wheezing] : no wheezing [Nausea] : no nausea [Abdominal Pain] : no abdominal pain [Vomiting] : no vomiting [Constipation] : no constipation [Diarrhea] : no diarrhea [Dysuria] : no dysuria

## 2020-05-28 NOTE — PHYSICAL EXAM
[No Acute Distress] : no acute distress [Normal Sclera/Conjunctiva] : normal sclera/conjunctiva [EOMI] : extraocular movements intact [PERRL] : pupils equal round and reactive to light [No Accessory Muscle Use] : no accessory muscle use [No Respiratory Distress] : no respiratory distress  [Clear to Auscultation] : lungs were clear to auscultation bilaterally [Normal Rate] : normal rate  [Normal S1, S2] : normal S1 and S2 [Regular Rhythm] : with a regular rhythm [Non Tender] : non-tender [Soft] : abdomen soft [Non-distended] : non-distended [Normal Bowel Sounds] : normal bowel sounds [de-identified] : poor dentition, TTP lower gums

## 2020-06-01 LAB
ALBUMIN SERPL ELPH-MCNC: 4.8 G/DL
ALP BLD-CCNC: 87 U/L
ALT SERPL-CCNC: 28 U/L
ANION GAP SERPL CALC-SCNC: 15 MMOL/L
AST SERPL-CCNC: 32 U/L
BASOPHILS # BLD AUTO: 0.09 K/UL
BASOPHILS NFR BLD AUTO: 0.7 %
BILIRUB SERPL-MCNC: 0.4 MG/DL
BUN SERPL-MCNC: 15 MG/DL
CALCIUM SERPL-MCNC: 9.9 MG/DL
CHLORIDE SERPL-SCNC: 98 MMOL/L
CHOLEST SERPL-MCNC: 114 MG/DL
CHOLEST/HDLC SERPL: 3.2 RATIO
CO2 SERPL-SCNC: 26 MMOL/L
CREAT SERPL-MCNC: 0.72 MG/DL
EOSINOPHIL # BLD AUTO: 0.4 K/UL
EOSINOPHIL NFR BLD AUTO: 2.9 %
ESTIMATED AVERAGE GLUCOSE: 275 MG/DL
GLUCOSE SERPL-MCNC: 216 MG/DL
HBA1C MFR BLD HPLC: 11.2 %
HCT VFR BLD CALC: 42.9 %
HDLC SERPL-MCNC: 36 MG/DL
HGB BLD-MCNC: 12.8 G/DL
IMM GRANULOCYTES NFR BLD AUTO: 0.2 %
LDLC SERPL CALC-MCNC: 53 MG/DL
LYMPHOCYTES # BLD AUTO: 2.41 K/UL
LYMPHOCYTES NFR BLD AUTO: 17.7 %
MAN DIFF?: NORMAL
MCHC RBC-ENTMCNC: 25.4 PG
MCHC RBC-ENTMCNC: 29.8 GM/DL
MCV RBC AUTO: 85.3 FL
MONOCYTES # BLD AUTO: 1.21 K/UL
MONOCYTES NFR BLD AUTO: 8.9 %
NEUTROPHILS # BLD AUTO: 9.45 K/UL
NEUTROPHILS NFR BLD AUTO: 69.6 %
PLATELET # BLD AUTO: 349 K/UL
POTASSIUM SERPL-SCNC: 4.5 MMOL/L
PROT SERPL-MCNC: 7.3 G/DL
RBC # BLD: 5.03 M/UL
RBC # FLD: 14.5 %
SODIUM SERPL-SCNC: 139 MMOL/L
TRIGL SERPL-MCNC: 125 MG/DL
TSH SERPL-ACNC: 2.88 UIU/ML
WBC # FLD AUTO: 13.59 K/UL

## 2020-06-22 ENCOUNTER — APPOINTMENT (OUTPATIENT)
Dept: ENDOCRINOLOGY | Facility: CLINIC | Age: 65
End: 2020-06-22
Payer: MEDICARE

## 2020-06-22 VITALS
DIASTOLIC BLOOD PRESSURE: 81 MMHG | OXYGEN SATURATION: 97 % | HEIGHT: 72 IN | BODY MASS INDEX: 25.73 KG/M2 | SYSTOLIC BLOOD PRESSURE: 159 MMHG | WEIGHT: 190 LBS | HEART RATE: 91 BPM | RESPIRATION RATE: 14 BRPM

## 2020-06-22 LAB — GLUCOSE BLDC GLUCOMTR-MCNC: 261

## 2020-06-22 PROCEDURE — 99214 OFFICE O/P EST MOD 30 MIN: CPT | Mod: 25

## 2020-06-22 PROCEDURE — 82962 GLUCOSE BLOOD TEST: CPT

## 2020-06-22 NOTE — ASSESSMENT
[FreeTextEntry1] : 1. DM 2- uncontrolled, uncomplicated\par Patient counseled on the importance of diabetic control and complications. Patient's diet and the necessary changes discussed. Patient needs to change his diet. He needs to check his blood sugars. I suggested since he cannot take humalog during lunch, he can try to have a low or no carb meal.\par \par \par Cont Metformin 1000 mg BID\par Cont Lantus 20 units QHS\par Cont Humalog 8 units TID\par Check fsg QID- Patient to get freestyle gregorio in few days and call me and let me know fro remote access so I can adjust his insulin\par Cont diabetic diet\par Cont exercise\par labs UTD\par optho UTS\par foot exam UTD\par \par 2. HLD- stable\par Well controlled on atorvastatin\par \par 3. Thyroid nodule\par following up with ENT\par \par Follow up in 3 months\par \par \par

## 2020-06-22 NOTE — HISTORY OF PRESENT ILLNESS
[FreeTextEntry1] : Mr. STANLEY GONSALEZ  is a 65 year old male with past medical history of Diabetes Mellitus Type 2,  who presents for management of his diabetes. \par \par Interval Hx:\par Patient had decreased his lantus dose to 15 units when he started the Humalog. He did not  a glucose meter/freestyle gregorio. 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.\par \par \par POCT Glucose: 261 mg/dL, Ate lunch\par \par \par Diabetes first diagnosed: years ago\par Type:2\par \par Current diabetic regimen:\par Lantus 20 units QHS\par Humalog 8 units TID w/ meals\par Metformin 2000 mg BID\par \par \par Other relevant medications:\par atorvastatin\par \par Pt does not check his fsg\par \par Hypoglycemia: none\par \par \par Diet:\par Breakfast:milk and cookie\par Lunch:sandwiches \par Dinner:meat, veggies,  starches\par Snacks:candy, coffee with sweetener\par \par Exercise: none\par \par Urine micro:wnl  (2/2020), (11/2019)\par lipid profile: ldl 53 (5/2020), 63  (11/2019)\par last hba1c: 11.2% (5/2020), 11.2% 2/2020, 10.8% 11/2019\par eye exam: 3/2020\par diabetic foot exam/podiatry:in office WNL (2/2020)\par \par Re Thyroid Nodule\par Incidentally found on PET\par Following with ENT in [facility]\par Plan for FNA in few months\par \par \par

## 2020-06-22 NOTE — PHYSICAL EXAM
[Alert] : alert [No Acute Distress] : no acute distress [Well Nourished] : well nourished [Well Developed] : well developed [EOMI] : extra ocular movement intact [Normal Sclera/Conjunctiva] : normal sclera/conjunctiva [Thyroid Not Enlarged] : the thyroid was not enlarged [Normal Oropharynx] : the oropharynx was normal [No Proptosis] : no proptosis [No Thyroid Nodules] : no palpable thyroid nodules [No Respiratory Distress] : no respiratory distress [No Accessory Muscle Use] : no accessory muscle use [Clear to Auscultation] : lungs were clear to auscultation bilaterally [Normal S1, S2] : normal S1 and S2 [Regular Rhythm] : with a regular rhythm [Normal Rate] : heart rate was normal [Normal Bowel Sounds] : normal bowel sounds [Pedal Pulses Normal] : the pedal pulses are present [No Edema] : no peripheral edema [Not Distended] : not distended [Soft] : abdomen soft [Not Tender] : non-tender [Normal Anterior Cervical Nodes] : no anterior cervical lymphadenopathy [Normal Posterior Cervical Nodes] : no posterior cervical lymphadenopathy [No Spinal Tenderness] : no spinal tenderness [Spine Straight] : spine straight [Normal Gait] : normal gait [No Stigmata of Cushings Syndrome] : no stigmata of Cushings Syndrome [Acanthosis Nigricans] : no acanthosis nigricans [Normal Strength/Tone] : muscle strength and tone were normal [No Rash] : no rash [No Tremors] : no tremors [Normal Reflexes] : deep tendon reflexes were 2+ and symmetric [Oriented x3] : oriented to person, place, and time

## 2020-07-17 RX ORDER — FLASH GLUCOSE SCANNING READER
EACH MISCELLANEOUS
Qty: 1 | Refills: 0 | Status: ACTIVE | COMMUNITY
Start: 2020-07-17 | End: 1900-01-01

## 2020-08-17 ENCOUNTER — RX RENEWAL (OUTPATIENT)
Age: 65
End: 2020-08-17

## 2020-09-01 ENCOUNTER — RX RENEWAL (OUTPATIENT)
Age: 65
End: 2020-09-01

## 2020-12-23 PROBLEM — Z12.11 ENCOUNTER FOR COLORECTAL CANCER SCREENING: Status: RESOLVED | Noted: 2017-06-28 | Resolved: 2020-12-23

## 2021-01-04 ENCOUNTER — RX RENEWAL (OUTPATIENT)
Age: 66
End: 2021-01-04

## 2021-01-04 RX ORDER — PEN NEEDLE, DIABETIC 29 G X1/2"
29G X 12.7MM NEEDLE, DISPOSABLE MISCELLANEOUS
Qty: 2 | Refills: 2 | Status: ACTIVE | COMMUNITY
Start: 2020-09-01 | End: 1900-01-01

## 2021-01-11 ENCOUNTER — APPOINTMENT (OUTPATIENT)
Dept: INTERNAL MEDICINE | Facility: CLINIC | Age: 66
End: 2021-01-11
Payer: MEDICARE

## 2021-01-11 VITALS
DIASTOLIC BLOOD PRESSURE: 70 MMHG | OXYGEN SATURATION: 95 % | RESPIRATION RATE: 14 BRPM | WEIGHT: 199 LBS | HEART RATE: 92 BPM | SYSTOLIC BLOOD PRESSURE: 144 MMHG | HEIGHT: 72 IN | TEMPERATURE: 96.5 F | BODY MASS INDEX: 26.95 KG/M2

## 2021-01-11 PROCEDURE — 99214 OFFICE O/P EST MOD 30 MIN: CPT

## 2021-01-11 NOTE — HISTORY OF PRESENT ILLNESS
[Diabetes Mellitus] : Diabetes Mellitus [Hyperlipidemia] : Hyperlipidemia [Hypertension] : Hypertension [No episodes] : No hypoglycemic episodes since the last visit. [Most Recent A1C: ___] : Most recent A1C was [unfilled] [<130/90] : Target blood pressure is  <130/90 [Target goal not met] : BP target goal not met [Stable] : Patient is stable [Low Intensity Therapy] : Patient is currently on low intensity statin  therapy

## 2021-01-28 LAB
ALBUMIN SERPL ELPH-MCNC: 4.8 G/DL
ALP BLD-CCNC: 91 U/L
ALT SERPL-CCNC: 19 U/L
ANION GAP SERPL CALC-SCNC: 12 MMOL/L
AST SERPL-CCNC: 17 U/L
BASOPHILS # BLD AUTO: 0.09 K/UL
BASOPHILS NFR BLD AUTO: 0.9 %
BILIRUB SERPL-MCNC: 0.3 MG/DL
BUN SERPL-MCNC: 16 MG/DL
CALCIUM SERPL-MCNC: 9.9 MG/DL
CHLORIDE SERPL-SCNC: 97 MMOL/L
CHOLEST SERPL-MCNC: 139 MG/DL
CO2 SERPL-SCNC: 28 MMOL/L
CREAT SERPL-MCNC: 0.81 MG/DL
CREAT SPEC-SCNC: 204 MG/DL
EOSINOPHIL # BLD AUTO: 0.65 K/UL
EOSINOPHIL NFR BLD AUTO: 6.8 %
ESTIMATED AVERAGE GLUCOSE: 243 MG/DL
GLUCOSE SERPL-MCNC: 188 MG/DL
HBA1C MFR BLD HPLC: 10.1 %
HCT VFR BLD CALC: 42.6 %
HDLC SERPL-MCNC: 36 MG/DL
HGB BLD-MCNC: 13.3 G/DL
IMM GRANULOCYTES NFR BLD AUTO: 0.3 %
LDLC SERPL CALC-MCNC: 76 MG/DL
LYMPHOCYTES # BLD AUTO: 2.46 K/UL
LYMPHOCYTES NFR BLD AUTO: 25.6 %
MAN DIFF?: NORMAL
MCHC RBC-ENTMCNC: 25.6 PG
MCHC RBC-ENTMCNC: 31.2 GM/DL
MCV RBC AUTO: 82.1 FL
MICROALBUMIN 24H UR DL<=1MG/L-MCNC: 5 MG/DL
MICROALBUMIN/CREAT 24H UR-RTO: 25 MG/G
MONOCYTES # BLD AUTO: 0.88 K/UL
MONOCYTES NFR BLD AUTO: 9.2 %
NEUTROPHILS # BLD AUTO: 5.49 K/UL
NEUTROPHILS NFR BLD AUTO: 57.2 %
NONHDLC SERPL-MCNC: 103 MG/DL
PLATELET # BLD AUTO: 314 K/UL
POTASSIUM SERPL-SCNC: 5.1 MMOL/L
PROT SERPL-MCNC: 7.1 G/DL
RBC # BLD: 5.19 M/UL
RBC # FLD: 14 %
SODIUM SERPL-SCNC: 138 MMOL/L
TRIGL SERPL-MCNC: 138 MG/DL
TSH SERPL-ACNC: 3.13 UIU/ML
WBC # FLD AUTO: 9.6 K/UL

## 2021-02-19 ENCOUNTER — RX RENEWAL (OUTPATIENT)
Age: 66
End: 2021-02-19

## 2021-03-11 ENCOUNTER — RX RENEWAL (OUTPATIENT)
Age: 66
End: 2021-03-11

## 2021-04-14 ENCOUNTER — APPOINTMENT (OUTPATIENT)
Dept: ENDOCRINOLOGY | Facility: CLINIC | Age: 66
End: 2021-04-14

## 2021-06-01 ENCOUNTER — RX RENEWAL (OUTPATIENT)
Age: 66
End: 2021-06-01

## 2021-06-02 ENCOUNTER — NON-APPOINTMENT (OUTPATIENT)
Age: 66
End: 2021-06-02

## 2021-06-02 ENCOUNTER — APPOINTMENT (OUTPATIENT)
Dept: INTERNAL MEDICINE | Facility: CLINIC | Age: 66
End: 2021-06-02
Payer: MEDICARE

## 2021-06-02 VITALS
WEIGHT: 180 LBS | HEIGHT: 72 IN | SYSTOLIC BLOOD PRESSURE: 136 MMHG | HEART RATE: 84 BPM | BODY MASS INDEX: 24.38 KG/M2 | OXYGEN SATURATION: 96 % | DIASTOLIC BLOOD PRESSURE: 70 MMHG | TEMPERATURE: 97.9 F | RESPIRATION RATE: 14 BRPM

## 2021-06-02 PROCEDURE — 93000 ELECTROCARDIOGRAM COMPLETE: CPT

## 2021-06-02 PROCEDURE — G0439: CPT

## 2021-06-02 RX ORDER — AMOXICILLIN AND CLAVULANATE POTASSIUM 875; 125 MG/1; MG/1
875-125 TABLET, COATED ORAL
Qty: 20 | Refills: 0 | Status: COMPLETED | COMMUNITY
Start: 2020-05-28 | End: 2021-06-02

## 2021-06-02 NOTE — REVIEW OF SYSTEMS
[Fever] : no fever [Chills] : no chills [Night Sweats] : no night sweats [Chest Pain] : no chest pain [Palpitations] : no palpitations [Lower Ext Edema] : no lower extremity edema [Shortness Of Breath] : no shortness of breath [Wheezing] : no wheezing [Cough] : no cough [Dyspnea on Exertion] : not dyspnea on exertion [Abdominal Pain] : no abdominal pain [Nausea] : no nausea [Constipation] : no constipation [Diarrhea] : no diarrhea [Vomiting] : no vomiting [Dysuria] : no dysuria [Muscle Pain] : no muscle pain [Muscle Weakness] : no muscle weakness [Itching] : no itching [Skin Rash] : no skin rash [Headache] : no headache [Dizziness] : no dizziness [Suicidal] : not suicidal [Anxiety] : no anxiety [Depression] : no depression [Easy Bleeding] : no easy bleeding [Easy Bruising] : no easy bruising [Swollen Glands] : no swollen glands

## 2021-06-02 NOTE — HEALTH RISK ASSESSMENT
[Good] : ~his/her~  mood as  good [0] : 2) Feeling down, depressed, or hopeless: Not at all (0) [] :  [# Of Children ___] : has [unfilled] children [Fully functional (bathing, dressing, toileting, transferring, walking, feeding)] : Fully functional (bathing, dressing, toileting, transferring, walking, feeding) [Fully functional (using the telephone, shopping, preparing meals, housekeeping, doing laundry, using] : Fully functional and needs no help or supervision to perform IADLs (using the telephone, shopping, preparing meals, housekeeping, doing laundry, using transportation, managing medications and managing finances) [] : No [[id number]] : 0 [Change in mental status noted] : No change in mental status noted [Reports changes in hearing] : Reports no changes in hearing [Reports changes in vision] : Reports no changes in vision

## 2021-06-03 LAB
ALBUMIN SERPL ELPH-MCNC: 4.8 G/DL
ALP BLD-CCNC: 63 U/L
ALT SERPL-CCNC: 17 U/L
ANION GAP SERPL CALC-SCNC: 16 MMOL/L
AST SERPL-CCNC: 18 U/L
BASOPHILS # BLD AUTO: 0.08 K/UL
BASOPHILS NFR BLD AUTO: 0.8 %
BILIRUB SERPL-MCNC: 0.2 MG/DL
BUN SERPL-MCNC: 20 MG/DL
CALCIUM SERPL-MCNC: 9.8 MG/DL
CHLORIDE SERPL-SCNC: 99 MMOL/L
CHOLEST SERPL-MCNC: 102 MG/DL
CO2 SERPL-SCNC: 25 MMOL/L
CREAT SERPL-MCNC: 0.74 MG/DL
CREAT SPEC-SCNC: 128 MG/DL
EOSINOPHIL # BLD AUTO: 0.3 K/UL
EOSINOPHIL NFR BLD AUTO: 2.9 %
ESTIMATED AVERAGE GLUCOSE: 192 MG/DL
GLUCOSE SERPL-MCNC: 130 MG/DL
HBA1C MFR BLD HPLC: 8.3 %
HCT VFR BLD CALC: 39.8 %
HDLC SERPL-MCNC: 32 MG/DL
HGB BLD-MCNC: 12.3 G/DL
IMM GRANULOCYTES NFR BLD AUTO: 0.3 %
LDLC SERPL CALC-MCNC: 50 MG/DL
LYMPHOCYTES # BLD AUTO: 2.44 K/UL
LYMPHOCYTES NFR BLD AUTO: 23.5 %
MAN DIFF?: NORMAL
MCHC RBC-ENTMCNC: 25.8 PG
MCHC RBC-ENTMCNC: 30.9 GM/DL
MCV RBC AUTO: 83.4 FL
MICROALBUMIN 24H UR DL<=1MG/L-MCNC: 1.2 MG/DL
MICROALBUMIN/CREAT 24H UR-RTO: NORMAL MG/G
MONOCYTES # BLD AUTO: 0.76 K/UL
MONOCYTES NFR BLD AUTO: 7.3 %
NEUTROPHILS # BLD AUTO: 6.76 K/UL
NEUTROPHILS NFR BLD AUTO: 65.2 %
NONHDLC SERPL-MCNC: 70 MG/DL
PLATELET # BLD AUTO: 306 K/UL
POTASSIUM SERPL-SCNC: 4.8 MMOL/L
PROT SERPL-MCNC: 7.1 G/DL
PSA SERPL-MCNC: 0.85 NG/ML
RBC # BLD: 4.77 M/UL
RBC # FLD: 15 %
SODIUM SERPL-SCNC: 140 MMOL/L
TRIGL SERPL-MCNC: 100 MG/DL
TSH SERPL-ACNC: 1.49 UIU/ML
WBC # FLD AUTO: 10.37 K/UL

## 2021-06-03 RX ORDER — INSULIN LISPRO 100 [IU]/ML
100 INJECTION, SOLUTION INTRAVENOUS; SUBCUTANEOUS 3 TIMES DAILY
Qty: 5 | Refills: 2 | Status: COMPLETED | COMMUNITY
Start: 2020-02-20 | End: 2021-06-03

## 2021-10-21 ENCOUNTER — RX RENEWAL (OUTPATIENT)
Age: 66
End: 2021-10-21

## 2022-01-05 ENCOUNTER — RX RENEWAL (OUTPATIENT)
Age: 67
End: 2022-01-05

## 2022-03-24 ENCOUNTER — APPOINTMENT (OUTPATIENT)
Dept: INTERNAL MEDICINE | Facility: CLINIC | Age: 67
End: 2022-03-24
Payer: MEDICARE

## 2022-03-24 VITALS
BODY MASS INDEX: 24.65 KG/M2 | SYSTOLIC BLOOD PRESSURE: 130 MMHG | DIASTOLIC BLOOD PRESSURE: 80 MMHG | HEART RATE: 69 BPM | RESPIRATION RATE: 14 BRPM | WEIGHT: 182 LBS | HEIGHT: 72 IN | OXYGEN SATURATION: 98 %

## 2022-03-24 DIAGNOSIS — E78.00 PURE HYPERCHOLESTEROLEMIA, UNSPECIFIED: ICD-10-CM

## 2022-03-24 PROCEDURE — 99214 OFFICE O/P EST MOD 30 MIN: CPT

## 2022-03-24 NOTE — REVIEW OF SYSTEMS
[Fever] : no fever [Chills] : no chills [Night Sweats] : no night sweats [Earache] : no earache [Vision Problems] : no vision problems [Nasal Discharge] : no nasal discharge [Sore Throat] : no sore throat [Chest Pain] : no chest pain [Palpitations] : no palpitations [Lower Ext Edema] : no lower extremity edema [Shortness Of Breath] : no shortness of breath [Wheezing] : no wheezing [Cough] : no cough [Dyspnea on Exertion] : not dyspnea on exertion [Abdominal Pain] : no abdominal pain [Nausea] : no nausea [Vomiting] : no vomiting [Dysuria] : no dysuria

## 2022-03-24 NOTE — ASSESSMENT
[FreeTextEntry1] : Lung mass, thyroid nodule: Reports had thyroid US, CT chest with normal results in January. Requested records for review. \par DM2: Check a1c, alb/cr. Recommended follow-up with his ophtho for diabetic eye exam. On metformin, onglyza, lantus. \par HTN, HLD: BP controlled. On ramipril, atorvastatin. Check lipids.

## 2022-03-24 NOTE — PHYSICAL EXAM
[No Acute Distress] : no acute distress [Normal Sclera/Conjunctiva] : normal sclera/conjunctiva [EOMI] : extraocular movements intact [PERRL] : pupils equal round and reactive to light [No Respiratory Distress] : no respiratory distress  [No Accessory Muscle Use] : no accessory muscle use [Clear to Auscultation] : lungs were clear to auscultation bilaterally [Normal Rate] : normal rate  [Regular Rhythm] : with a regular rhythm [Normal S1, S2] : normal S1 and S2 [No Edema] : there was no peripheral edema [Soft] : abdomen soft [Non Tender] : non-tender [Non-distended] : non-distended [Normal Bowel Sounds] : normal bowel sounds

## 2022-03-24 NOTE — HISTORY OF PRESENT ILLNESS
[Diabetes Mellitus] : Diabetes Mellitus [Hyperlipidemia] : Hyperlipidemia [Hypertension] : Hypertension [No episodes] : No hypoglycemic episodes since the last visit. [Most Recent A1C: ___] : Most recent A1C was [unfilled] [Target A1C:  ___] : Target A1C is [unfilled] [Target goal not met] : A1C target goal not met [Moderate Intensity] : Patient is currently on moderate intensity statin  therapy [<130/90] : Target blood pressure is  <130/90 [Target goal met] : BP target goal met [Managed with medications] : managed with  medication [Moderate Intensity Therapy] : Patient is currently on moderate intensity statin  therapy

## 2022-03-26 ENCOUNTER — APPOINTMENT (OUTPATIENT)
Dept: INTERNAL MEDICINE | Facility: CLINIC | Age: 67
End: 2022-03-26

## 2022-03-28 LAB
ALBUMIN SERPL ELPH-MCNC: 4.8 G/DL
ALP BLD-CCNC: 86 U/L
ALT SERPL-CCNC: 18 U/L
ANION GAP SERPL CALC-SCNC: 12 MMOL/L
AST SERPL-CCNC: 15 U/L
BASOPHILS # BLD AUTO: 0.09 K/UL
BASOPHILS NFR BLD AUTO: 1 %
BILIRUB SERPL-MCNC: 0.3 MG/DL
BUN SERPL-MCNC: 12 MG/DL
CALCIUM SERPL-MCNC: 9.9 MG/DL
CHLORIDE SERPL-SCNC: 100 MMOL/L
CHOLEST SERPL-MCNC: 106 MG/DL
CO2 SERPL-SCNC: 29 MMOL/L
CREAT SERPL-MCNC: 0.7 MG/DL
CREAT SPEC-SCNC: 96 MG/DL
EGFR: 100 ML/MIN/1.73M2
EOSINOPHIL # BLD AUTO: 0.45 K/UL
EOSINOPHIL NFR BLD AUTO: 4.8 %
ESTIMATED AVERAGE GLUCOSE: 200 MG/DL
GLUCOSE SERPL-MCNC: 180 MG/DL
HBA1C MFR BLD HPLC: 8.6 %
HCT VFR BLD CALC: 42.2 %
HDLC SERPL-MCNC: 38 MG/DL
HGB BLD-MCNC: 13.1 G/DL
IMM GRANULOCYTES NFR BLD AUTO: 0.6 %
LDLC SERPL CALC-MCNC: 44 MG/DL
LYMPHOCYTES # BLD AUTO: 2.36 K/UL
LYMPHOCYTES NFR BLD AUTO: 25.1 %
MAN DIFF?: NORMAL
MCHC RBC-ENTMCNC: 25.9 PG
MCHC RBC-ENTMCNC: 31 GM/DL
MCV RBC AUTO: 83.6 FL
MICROALBUMIN 24H UR DL<=1MG/L-MCNC: <1.2 MG/DL
MICROALBUMIN/CREAT 24H UR-RTO: NORMAL MG/G
MONOCYTES # BLD AUTO: 0.77 K/UL
MONOCYTES NFR BLD AUTO: 8.2 %
NEUTROPHILS # BLD AUTO: 5.67 K/UL
NEUTROPHILS NFR BLD AUTO: 60.3 %
NONHDLC SERPL-MCNC: 68 MG/DL
PLATELET # BLD AUTO: 277 K/UL
POTASSIUM SERPL-SCNC: 5.2 MMOL/L
PROT SERPL-MCNC: 7.1 G/DL
RBC # BLD: 5.05 M/UL
RBC # FLD: 14.2 %
SODIUM SERPL-SCNC: 141 MMOL/L
TRIGL SERPL-MCNC: 118 MG/DL
TSH SERPL-ACNC: 2.99 UIU/ML
WBC # FLD AUTO: 9.4 K/UL

## 2022-06-09 ENCOUNTER — RX RENEWAL (OUTPATIENT)
Age: 67
End: 2022-06-09

## 2022-09-18 ENCOUNTER — RX RENEWAL (OUTPATIENT)
Age: 67
End: 2022-09-18

## 2022-09-19 ENCOUNTER — RX RENEWAL (OUTPATIENT)
Age: 67
End: 2022-09-19

## 2022-09-20 ENCOUNTER — APPOINTMENT (OUTPATIENT)
Dept: INTERNAL MEDICINE | Facility: CLINIC | Age: 67
End: 2022-09-20

## 2022-09-20 ENCOUNTER — NON-APPOINTMENT (OUTPATIENT)
Age: 67
End: 2022-09-20

## 2022-09-20 VITALS
DIASTOLIC BLOOD PRESSURE: 60 MMHG | TEMPERATURE: 97.2 F | OXYGEN SATURATION: 98 % | BODY MASS INDEX: 24.11 KG/M2 | WEIGHT: 178 LBS | RESPIRATION RATE: 14 BRPM | SYSTOLIC BLOOD PRESSURE: 148 MMHG | HEIGHT: 72 IN | HEART RATE: 71 BPM

## 2022-09-20 DIAGNOSIS — R91.8 OTHER NONSPECIFIC ABNORMAL FINDING OF LUNG FIELD: ICD-10-CM

## 2022-09-20 DIAGNOSIS — Z86.2 PERSONAL HISTORY OF DISEASES OF THE BLOOD AND BLOOD-FORMING ORGANS AND CERTAIN DISORDERS INVOLVING THE IMMUNE MECHANISM: ICD-10-CM

## 2022-09-20 DIAGNOSIS — E04.1 NONTOXIC SINGLE THYROID NODULE: ICD-10-CM

## 2022-09-20 DIAGNOSIS — Z87.19 PERSONAL HISTORY OF OTHER DISEASES OF THE DIGESTIVE SYSTEM: ICD-10-CM

## 2022-09-20 PROCEDURE — 93000 ELECTROCARDIOGRAM COMPLETE: CPT

## 2022-09-20 PROCEDURE — G0439: CPT

## 2022-09-20 NOTE — HEALTH RISK ASSESSMENT
[Former] : Former [20 or more] : 20 or more [0] : 2) Feeling down, depressed, or hopeless: Not at all (0) [PHQ-2 Negative - No further assessment needed] : PHQ-2 Negative - No further assessment needed [] :  [Fully functional (bathing, dressing, toileting, transferring, walking, feeding)] : Fully functional (bathing, dressing, toileting, transferring, walking, feeding) [Fully functional (using the telephone, shopping, preparing meals, housekeeping, doing laundry, using] : Fully functional and needs no help or supervision to perform IADLs (using the telephone, shopping, preparing meals, housekeeping, doing laundry, using transportation, managing medications and managing finances) [Reports normal functional visual acuity (ie: able to read med bottle)] : Reports normal functional visual acuity [Smoke Detector] : smoke detector [Seat Belt] :  uses seat belt [With Patient/Caregiver] : , with patient/caregiver [Designated Healthcare Proxy] : Designated healthcare proxy [Name: ___] : Health Care Proxy's Name: [unfilled]  [Relationship: ___] : Relationship: [unfilled] [Good] : ~his/her~  mood as  good [YearQuit] : 2014 [[id number]] : 0 [Change in mental status noted] : No change in mental status noted [Reports changes in hearing] : Reports no changes in hearing [Reports changes in vision] : Reports no changes in vision [AdvancecareDate] : 09/22

## 2022-09-20 NOTE — ASSESSMENT
[FreeTextEntry1] : Thyroid nodule, lung mass: Following with Dr Elmore for monitoring at [facility]. \par HLD, HTN: BP elevated. Continue ramipril. Continue monitor BP. Check lipids. On atorvastatin. \par DM2: Check a1c, alb/cr. On lantus, metformin, onglyza. \par HCM: Due for colonoscopy in 2027. Recommended shingrix at the pharmacy. Check labs. Will discuss results. \par

## 2022-09-20 NOTE — PHYSICAL EXAM
[No Acute Distress] : no acute distress [Normal Sclera/Conjunctiva] : normal sclera/conjunctiva [PERRL] : pupils equal round and reactive to light [EOMI] : extraocular movements intact [Normal TMs] : both tympanic membranes were normal [No Lymphadenopathy] : no lymphadenopathy [Supple] : supple [No Respiratory Distress] : no respiratory distress  [No Accessory Muscle Use] : no accessory muscle use [Clear to Auscultation] : lungs were clear to auscultation bilaterally [Normal Rate] : normal rate  [Normal S1, S2] : normal S1 and S2 [No Edema] : there was no peripheral edema [Soft] : abdomen soft [Non Tender] : non-tender [Non-distended] : non-distended [Normal Bowel Sounds] : normal bowel sounds [Normal Posterior Cervical Nodes] : no posterior cervical lymphadenopathy [Normal Anterior Cervical Nodes] : no anterior cervical lymphadenopathy [No Spinal Tenderness] : no spinal tenderness [Grossly Normal Strength/Tone] : grossly normal strength/tone [No Focal Deficits] : no focal deficits [Normal Gait] : normal gait [Deep Tendon Reflexes (DTR)] : deep tendon reflexes were 2+ and symmetric [Normal Affect] : the affect was normal [Normal Insight/Judgement] : insight and judgment were intact [de-identified] : grade 1 systolic murmur

## 2022-09-20 NOTE — REVIEW OF SYSTEMS

## 2022-09-24 DIAGNOSIS — D64.9 ANEMIA, UNSPECIFIED: ICD-10-CM

## 2022-09-26 LAB
ALBUMIN SERPL ELPH-MCNC: 4.7 G/DL
ALP BLD-CCNC: 71 U/L
ALT SERPL-CCNC: 18 U/L
ANION GAP SERPL CALC-SCNC: 11 MMOL/L
AST SERPL-CCNC: 14 U/L
BASOPHILS # BLD AUTO: 0.09 K/UL
BASOPHILS NFR BLD AUTO: 1.2 %
BILIRUB SERPL-MCNC: 0.3 MG/DL
BUN SERPL-MCNC: 20 MG/DL
CALCIUM SERPL-MCNC: 9.7 MG/DL
CHLORIDE SERPL-SCNC: 99 MMOL/L
CHOLEST SERPL-MCNC: 97 MG/DL
CO2 SERPL-SCNC: 28 MMOL/L
CREAT SERPL-MCNC: 0.77 MG/DL
EGFR: 98 ML/MIN/1.73M2
EOSINOPHIL # BLD AUTO: 0.39 K/UL
EOSINOPHIL NFR BLD AUTO: 5 %
ESTIMATED AVERAGE GLUCOSE: 226 MG/DL
FERRITIN SERPL-MCNC: 24 NG/ML
FOLATE SERPL-MCNC: 17.6 NG/ML
GLUCOSE SERPL-MCNC: 202 MG/DL
HBA1C MFR BLD HPLC: 9.5 %
HCT VFR BLD CALC: 38.9 %
HDLC SERPL-MCNC: 33 MG/DL
HGB BLD-MCNC: 12.5 G/DL
IMM GRANULOCYTES NFR BLD AUTO: 0.4 %
IRON SATN MFR SERPL: 19 %
IRON SERPL-MCNC: 77 UG/DL
LDLC SERPL CALC-MCNC: 49 MG/DL
LYMPHOCYTES # BLD AUTO: 2.24 K/UL
LYMPHOCYTES NFR BLD AUTO: 28.6 %
MAN DIFF?: NORMAL
MCHC RBC-ENTMCNC: 26.1 PG
MCHC RBC-ENTMCNC: 32.1 GM/DL
MCV RBC AUTO: 81.2 FL
MONOCYTES # BLD AUTO: 0.66 K/UL
MONOCYTES NFR BLD AUTO: 8.4 %
NEUTROPHILS # BLD AUTO: 4.41 K/UL
NEUTROPHILS NFR BLD AUTO: 56.4 %
NONHDLC SERPL-MCNC: 64 MG/DL
PLATELET # BLD AUTO: 263 K/UL
POTASSIUM SERPL-SCNC: 4.8 MMOL/L
PROT SERPL-MCNC: 7 G/DL
PSA SERPL-MCNC: 1.16 NG/ML
RBC # BLD: 4.79 M/UL
RBC # FLD: 14.3 %
SODIUM SERPL-SCNC: 137 MMOL/L
T3 SERPL-MCNC: 97 NG/DL
T4 FREE SERPL-MCNC: 1.3 NG/DL
TIBC SERPL-MCNC: 399 UG/DL
TRIGL SERPL-MCNC: 78 MG/DL
TSH SERPL-ACNC: 1.94 UIU/ML
UIBC SERPL-MCNC: 322 UG/DL
VIT B12 SERPL-MCNC: 451 PG/ML
WBC # FLD AUTO: 7.82 K/UL

## 2022-12-08 ENCOUNTER — RX RENEWAL (OUTPATIENT)
Age: 67
End: 2022-12-08

## 2023-01-24 RX ORDER — SAXAGLIPTIN 5 MG/1
5 TABLET, FILM COATED ORAL
Qty: 90 | Refills: 1 | Status: ACTIVE | COMMUNITY
Start: 2021-06-03 | End: 1900-01-01

## 2023-02-22 ENCOUNTER — APPOINTMENT (OUTPATIENT)
Dept: INTERNAL MEDICINE | Facility: CLINIC | Age: 68
End: 2023-02-22
Payer: MEDICARE

## 2023-02-22 VITALS
TEMPERATURE: 98.4 F | WEIGHT: 180 LBS | HEART RATE: 78 BPM | RESPIRATION RATE: 16 BRPM | OXYGEN SATURATION: 99 % | DIASTOLIC BLOOD PRESSURE: 86 MMHG | HEIGHT: 72 IN | BODY MASS INDEX: 24.38 KG/M2 | SYSTOLIC BLOOD PRESSURE: 128 MMHG

## 2023-02-22 PROCEDURE — 99214 OFFICE O/P EST MOD 30 MIN: CPT

## 2023-02-22 RX ORDER — FLASH GLUCOSE SENSOR
KIT MISCELLANEOUS
Qty: 6 | Refills: 2 | Status: ACTIVE | COMMUNITY
Start: 2020-07-17 | End: 1900-01-01

## 2023-02-22 NOTE — HISTORY OF PRESENT ILLNESS
[Diabetes Mellitus] : Diabetes Mellitus [Hyperlipidemia] : Hyperlipidemia [Hypertension] : Hypertension [No episodes] : No hypoglycemic episodes since the last visit. [Most Recent A1C: ___] : Most recent A1C was [unfilled] [Target A1C:  ___] : Target A1C is [unfilled] [Target goal not met] : A1C target goal not met [<130/90] : Target blood pressure is  <130/90 [Target goal met] : BP target goal met [Managed with medications] : managed with  medication [Moderate Intensity Therapy] : Patient is currently on moderate intensity statin  therapy

## 2023-02-22 NOTE — ASSESSMENT
[FreeTextEntry1] : HTN, HLD: On atorvastdatin, ramipril. Check lipids. BP controlled. \par DM2: On lantus, metformin, onglyza. Check a1c, alb/cr. Treatment based on results. \par

## 2023-02-23 LAB
ALBUMIN SERPL ELPH-MCNC: 4.8 G/DL
ALP BLD-CCNC: 76 U/L
ALT SERPL-CCNC: 18 U/L
ANION GAP SERPL CALC-SCNC: 11 MMOL/L
AST SERPL-CCNC: 16 U/L
BASOPHILS # BLD AUTO: 0.07 K/UL
BASOPHILS NFR BLD AUTO: 0.9 %
BILIRUB SERPL-MCNC: 0.2 MG/DL
BUN SERPL-MCNC: 13 MG/DL
CALCIUM SERPL-MCNC: 9.5 MG/DL
CHLORIDE SERPL-SCNC: 99 MMOL/L
CHOLEST SERPL-MCNC: 99 MG/DL
CO2 SERPL-SCNC: 29 MMOL/L
CREAT SERPL-MCNC: 0.81 MG/DL
EGFR: 95 ML/MIN/1.73M2
EOSINOPHIL # BLD AUTO: 0.62 K/UL
EOSINOPHIL NFR BLD AUTO: 7.6 %
ESTIMATED AVERAGE GLUCOSE: 203 MG/DL
FERRITIN SERPL-MCNC: 24 NG/ML
FOLATE SERPL-MCNC: >20 NG/ML
GLUCOSE SERPL-MCNC: 155 MG/DL
HBA1C MFR BLD HPLC: 8.7 %
HCT VFR BLD CALC: 40.9 %
HDLC SERPL-MCNC: 33 MG/DL
HGB BLD-MCNC: 12.8 G/DL
IMM GRANULOCYTES NFR BLD AUTO: 0.4 %
IRON SATN MFR SERPL: 9 %
IRON SERPL-MCNC: 38 UG/DL
LDLC SERPL CALC-MCNC: 50 MG/DL
LYMPHOCYTES # BLD AUTO: 2.06 K/UL
LYMPHOCYTES NFR BLD AUTO: 25.4 %
MAN DIFF?: NORMAL
MCHC RBC-ENTMCNC: 25.8 PG
MCHC RBC-ENTMCNC: 31.3 GM/DL
MCV RBC AUTO: 82.5 FL
MONOCYTES # BLD AUTO: 0.9 K/UL
MONOCYTES NFR BLD AUTO: 11.1 %
NEUTROPHILS # BLD AUTO: 4.43 K/UL
NEUTROPHILS NFR BLD AUTO: 54.6 %
NONHDLC SERPL-MCNC: 66 MG/DL
PLATELET # BLD AUTO: 244 K/UL
POTASSIUM SERPL-SCNC: 4.8 MMOL/L
PROT SERPL-MCNC: 7.3 G/DL
RBC # BLD: 4.96 M/UL
RBC # FLD: 14.6 %
SODIUM SERPL-SCNC: 139 MMOL/L
TIBC SERPL-MCNC: 409 UG/DL
TRIGL SERPL-MCNC: 81 MG/DL
UIBC SERPL-MCNC: 371 UG/DL
VIT B12 SERPL-MCNC: 603 PG/ML
WBC # FLD AUTO: 8.11 K/UL

## 2023-07-25 ENCOUNTER — RX RENEWAL (OUTPATIENT)
Age: 68
End: 2023-07-25

## 2023-09-04 ENCOUNTER — RX RENEWAL (OUTPATIENT)
Age: 68
End: 2023-09-04

## 2023-09-05 ENCOUNTER — APPOINTMENT (OUTPATIENT)
Dept: GASTROENTEROLOGY | Facility: CLINIC | Age: 68
End: 2023-09-05
Payer: MEDICARE

## 2023-09-05 VITALS
RESPIRATION RATE: 15 BRPM | SYSTOLIC BLOOD PRESSURE: 129 MMHG | OXYGEN SATURATION: 94 % | TEMPERATURE: 98 F | DIASTOLIC BLOOD PRESSURE: 75 MMHG | WEIGHT: 167 LBS | BODY MASS INDEX: 22.65 KG/M2 | HEART RATE: 76 BPM

## 2023-09-05 DIAGNOSIS — E61.1 IRON DEFICIENCY: ICD-10-CM

## 2023-09-05 PROCEDURE — 99204 OFFICE O/P NEW MOD 45 MIN: CPT

## 2023-09-05 RX ORDER — SODIUM SULFATE, MAGNESIUM SULFATE, AND POTASSIUM CHLORIDE 17.75; 2.7; 2.25 G/1; G/1; G/1
1479-225-188 TABLET ORAL
Qty: 24 | Refills: 0 | Status: ACTIVE | COMMUNITY
Start: 2023-09-05 | End: 1900-01-01

## 2023-09-05 NOTE — PHYSICAL EXAM
[General Appearance - Alert] : alert [General Appearance - In No Acute Distress] : in no acute distress [Outer Ear] : the ears and nose were normal in appearance [Oropharynx] : the oropharynx was normal [Neck Appearance] : the appearance of the neck was normal [Neck Cervical Mass (___cm)] : no neck mass was observed [Jugular Venous Distention Increased] : there was no jugular-venous distention [Thyroid Diffuse Enlargement] : the thyroid was not enlarged [Thyroid Nodule] : there were no palpable thyroid nodules [Auscultation Breath Sounds / Voice Sounds] : lungs were clear to auscultation bilaterally [Edema] : there was no peripheral edema [Bowel Sounds] : normal bowel sounds [Abdomen Soft] : soft [Abdomen Tenderness] : non-tender [Abdomen Mass (___ Cm)] : no abdominal mass palpated [Cervical Lymph Nodes Enlarged Posterior Bilaterally] : posterior cervical [Cervical Lymph Nodes Enlarged Anterior Bilaterally] : anterior cervical [No CVA Tenderness] : no ~M costovertebral angle tenderness [No Spinal Tenderness] : no spinal tenderness [Abnormal Walk] : normal gait [Nail Clubbing] : no clubbing  or cyanosis of the fingernails [Musculoskeletal - Swelling] : no joint swelling seen [Motor Tone] : muscle strength and tone were normal [Skin Color & Pigmentation] : normal skin color and pigmentation [Skin Turgor] : normal skin turgor [] : no rash [No Focal Deficits] : no focal deficits [Oriented To Time, Place, And Person] : oriented to person, place, and time [Impaired Insight] : insight and judgment were intact [Affect] : the affect was normal

## 2023-09-05 NOTE — ASSESSMENT
[FreeTextEntry1] : Impression is that of a male who has iron deficiency anemia.  I have asked the patient to schedule both an upper endoscopy and a colonoscopy in the near future. I have reviewed the risks benefits and alternatives and provide the patient literature to read.  I have emphasized the need to have a good clean out including adequate fluid intake and avoiding seeds for one week prior to the procedure. To speak to PMD regarding management of his diabetic medications.  Ordered FIT test for fecal occult blood.

## 2023-09-05 NOTE — HISTORY OF PRESENT ILLNESS
[FreeTextEntry1] : PREPROCEDURE INDICATION:Screening colonoscopy.    POSTPROCEDURE FINDINGS:Internal hemorrhoids, anal polyp, colon polyp.    OPERATION:Colonoscopy.     ANESTHESIA:MAC.  ESTIMATED BLOOD LOSS:0.  COLON PREP:Adequate.  WITHDRAWAL TIME:Over 6 minutes.    PROCEDURE:After reviewing the risks, benefits, and alternatives to colonoscopy in the preprocedure area  and obtaining consent, the patient was brought to the endoscopy suite where careful inspection of the lower intestine was performed without acute complication and the following findings were noted:   Normal digital rectal exam.  Internal hemorrhoids seen both directly and upon retroflexion.  There was also a 1 cm anal tag that was more prominent than it is typical.  There was a dimunitive polyp in the rectum that was removed using cold biopsy technique.  There was a dimunitive polyp in the sigmoid colon that was removed using cold biopsy technique.  There was a 7 mm polyp in the right colon that was removed using cold biopsy technique.  Otherwise normal to terminal ileum.

## 2023-09-08 LAB — HEMOCCULT STL QL IA: NEGATIVE

## 2023-09-21 ENCOUNTER — APPOINTMENT (OUTPATIENT)
Dept: INTERNAL MEDICINE | Facility: CLINIC | Age: 68
End: 2023-09-21
Payer: MEDICARE

## 2023-09-21 VITALS
WEIGHT: 160 LBS | HEART RATE: 73 BPM | DIASTOLIC BLOOD PRESSURE: 62 MMHG | RESPIRATION RATE: 15 BRPM | OXYGEN SATURATION: 98 % | SYSTOLIC BLOOD PRESSURE: 118 MMHG | HEIGHT: 72 IN | BODY MASS INDEX: 21.67 KG/M2 | TEMPERATURE: 97.9 F

## 2023-09-21 DIAGNOSIS — R63.4 ABNORMAL WEIGHT LOSS: ICD-10-CM

## 2023-09-21 DIAGNOSIS — Z00.00 ENCOUNTER FOR GENERAL ADULT MEDICAL EXAMINATION W/OUT ABNORMAL FINDINGS: ICD-10-CM

## 2023-09-21 PROCEDURE — 99213 OFFICE O/P EST LOW 20 MIN: CPT | Mod: 25

## 2023-09-21 PROCEDURE — G0439: CPT

## 2023-09-25 ENCOUNTER — RESULT REVIEW (OUTPATIENT)
Age: 68
End: 2023-09-25

## 2023-09-26 DIAGNOSIS — R79.89 OTHER SPECIFIED ABNORMAL FINDINGS OF BLOOD CHEMISTRY: ICD-10-CM

## 2023-09-28 LAB
ALBUMIN SERPL ELPH-MCNC: 4.3 G/DL
ALP BLD-CCNC: 93 U/L
ALT SERPL-CCNC: 14 U/L
ANION GAP SERPL CALC-SCNC: 13 MMOL/L
AST SERPL-CCNC: 14 U/L
BILIRUB SERPL-MCNC: 0.4 MG/DL
BUN SERPL-MCNC: 18 MG/DL
CALCIUM SERPL-MCNC: 9.7 MG/DL
CHLORIDE SERPL-SCNC: 98 MMOL/L
CHOLEST SERPL-MCNC: 87 MG/DL
CO2 SERPL-SCNC: 27 MMOL/L
CREAT SERPL-MCNC: 0.71 MG/DL
CREAT SPEC-SCNC: 146 MG/DL
EGFR: 99 ML/MIN/1.73M2
ESTIMATED AVERAGE GLUCOSE: 206 MG/DL
FERRITIN SERPL-MCNC: 145 NG/ML
GLUCOSE SERPL-MCNC: 171 MG/DL
HBA1C MFR BLD HPLC: 8.8 %
HCT VFR BLD CALC: 35.7 %
HDLC SERPL-MCNC: 27 MG/DL
HGB BLD-MCNC: 11.2 G/DL
IRON SATN MFR SERPL: 23 %
IRON SERPL-MCNC: 72 UG/DL
LDLC SERPL CALC-MCNC: 43 MG/DL
MCHC RBC-ENTMCNC: 25.9 PG
MCHC RBC-ENTMCNC: 31.4 GM/DL
MCV RBC AUTO: 82.4 FL
MICROALBUMIN 24H UR DL<=1MG/L-MCNC: <1.2 MG/DL
MICROALBUMIN/CREAT 24H UR-RTO: NORMAL MG/G
NONHDLC SERPL-MCNC: 60 MG/DL
PLATELET # BLD AUTO: 366 K/UL
POTASSIUM SERPL-SCNC: 4.8 MMOL/L
PROT SERPL-MCNC: 7.3 G/DL
PSA SERPL-MCNC: 1.46 NG/ML
RBC # BLD: 4.33 M/UL
RBC # FLD: 13.4 %
SODIUM SERPL-SCNC: 139 MMOL/L
TIBC SERPL-MCNC: 316 UG/DL
TRIGL SERPL-MCNC: 85 MG/DL
TSH SERPL-ACNC: 0.01 UIU/ML
UIBC SERPL-MCNC: 243 UG/DL
WBC # FLD AUTO: 12.12 K/UL

## 2023-09-29 ENCOUNTER — OUTPATIENT (OUTPATIENT)
Dept: OUTPATIENT SERVICES | Facility: HOSPITAL | Age: 68
LOS: 1 days | End: 2023-09-29
Payer: MEDICARE

## 2023-09-29 ENCOUNTER — APPOINTMENT (OUTPATIENT)
Dept: CT IMAGING | Facility: CLINIC | Age: 68
End: 2023-09-29
Payer: MEDICARE

## 2023-09-29 DIAGNOSIS — R63.4 ABNORMAL WEIGHT LOSS: ICD-10-CM

## 2023-09-29 PROCEDURE — 74177 CT ABD & PELVIS W/CONTRAST: CPT | Mod: 26

## 2023-09-29 PROCEDURE — 74177 CT ABD & PELVIS W/CONTRAST: CPT

## 2023-10-09 ENCOUNTER — RESULT REVIEW (OUTPATIENT)
Age: 68
End: 2023-10-09

## 2023-10-09 ENCOUNTER — APPOINTMENT (OUTPATIENT)
Dept: GASTROENTEROLOGY | Facility: HOSPITAL | Age: 68
End: 2023-10-09

## 2023-10-09 ENCOUNTER — OUTPATIENT (OUTPATIENT)
Dept: OUTPATIENT SERVICES | Facility: HOSPITAL | Age: 68
LOS: 1 days | End: 2023-10-09
Payer: MEDICARE

## 2023-10-09 DIAGNOSIS — Z12.11 ENCOUNTER FOR SCREENING FOR MALIGNANT NEOPLASM OF COLON: ICD-10-CM

## 2023-10-09 LAB — GLUCOSE BLDC GLUCOMTR-MCNC: 165 MG/DL — HIGH (ref 70–99)

## 2023-10-09 PROCEDURE — 43239 EGD BIOPSY SINGLE/MULTIPLE: CPT

## 2023-10-09 PROCEDURE — 88312 SPECIAL STAINS GROUP 1: CPT | Mod: 26

## 2023-10-09 PROCEDURE — 45380 COLONOSCOPY AND BIOPSY: CPT | Mod: 59

## 2023-10-09 PROCEDURE — 88312 SPECIAL STAINS GROUP 1: CPT

## 2023-10-09 PROCEDURE — 45385 COLONOSCOPY W/LESION REMOVAL: CPT

## 2023-10-09 PROCEDURE — 43239 EGD BIOPSY SINGLE/MULTIPLE: CPT | Mod: 59

## 2023-10-09 PROCEDURE — 45380 COLONOSCOPY AND BIOPSY: CPT | Mod: XS

## 2023-10-09 PROCEDURE — 88305 TISSUE EXAM BY PATHOLOGIST: CPT | Mod: 26

## 2023-10-09 PROCEDURE — 82962 GLUCOSE BLOOD TEST: CPT

## 2023-10-09 PROCEDURE — 88305 TISSUE EXAM BY PATHOLOGIST: CPT

## 2023-10-09 RX ORDER — SODIUM CHLORIDE 9 MG/ML
500 INJECTION INTRAMUSCULAR; INTRAVENOUS; SUBCUTANEOUS
Refills: 0 | Status: COMPLETED | OUTPATIENT
Start: 2023-10-09 | End: 2023-10-09

## 2023-10-09 RX ADMIN — SODIUM CHLORIDE 75 MILLILITER(S): 9 INJECTION INTRAMUSCULAR; INTRAVENOUS; SUBCUTANEOUS at 12:16

## 2023-10-12 LAB — SURGICAL PATHOLOGY STUDY: SIGNIFICANT CHANGE UP

## 2023-10-25 ENCOUNTER — RX RENEWAL (OUTPATIENT)
Age: 68
End: 2023-10-25

## 2024-02-28 ENCOUNTER — RX RENEWAL (OUTPATIENT)
Age: 69
End: 2024-02-28

## 2024-06-04 RX ORDER — FLASH GLUCOSE SENSOR
KIT MISCELLANEOUS
Qty: 6 | Refills: 1 | Status: ACTIVE | COMMUNITY
Start: 2020-02-20 | End: 1900-01-01

## 2024-06-05 ENCOUNTER — APPOINTMENT (OUTPATIENT)
Dept: INTERNAL MEDICINE | Facility: CLINIC | Age: 69
End: 2024-06-05
Payer: MEDICARE

## 2024-06-05 VITALS
RESPIRATION RATE: 15 BRPM | OXYGEN SATURATION: 98 % | WEIGHT: 174 LBS | TEMPERATURE: 98.4 F | HEIGHT: 72 IN | DIASTOLIC BLOOD PRESSURE: 78 MMHG | BODY MASS INDEX: 23.57 KG/M2 | HEART RATE: 98 BPM | SYSTOLIC BLOOD PRESSURE: 130 MMHG

## 2024-06-05 DIAGNOSIS — I10 ESSENTIAL (PRIMARY) HYPERTENSION: ICD-10-CM

## 2024-06-05 DIAGNOSIS — E11.9 TYPE 2 DIABETES MELLITUS W/OUT COMPLICATIONS: ICD-10-CM

## 2024-06-05 DIAGNOSIS — R22.1 LOCALIZED SWELLING, MASS AND LUMP, NECK: ICD-10-CM

## 2024-06-05 DIAGNOSIS — E78.5 HYPERLIPIDEMIA, UNSPECIFIED: ICD-10-CM

## 2024-06-05 PROCEDURE — G2211 COMPLEX E/M VISIT ADD ON: CPT

## 2024-06-05 PROCEDURE — 99214 OFFICE O/P EST MOD 30 MIN: CPT

## 2024-06-05 NOTE — ASSESSMENT
[FreeTextEntry1] : Neck nodule: Requested he sent report from [facility] for CT chest. Discussed neck US.  DM2: Check a1c, alb/cr. Continute onglyza 5mg daily, metformin 1000mg BID.  HLD: Check lipids. continue atorvastatin 20mg daily.  HTN: BP controlled. Continue ramipril 5mg BID.

## 2024-06-05 NOTE — PHYSICAL EXAM
[No Acute Distress] : no acute distress [Normal Sclera/Conjunctiva] : normal sclera/conjunctiva [PERRL] : pupils equal round and reactive to light [EOMI] : extraocular movements intact [No Respiratory Distress] : no respiratory distress  [No Accessory Muscle Use] : no accessory muscle use [Clear to Auscultation] : lungs were clear to auscultation bilaterally [Normal Rate] : normal rate  [Regular Rhythm] : with a regular rhythm [Normal S1, S2] : normal S1 and S2 [Soft] : abdomen soft [Non Tender] : non-tender [Non-distended] : non-distended [Normal Bowel Sounds] : normal bowel sounds [de-identified] : 3cm firm, mobile mass right base of neck

## 2024-06-05 NOTE — HISTORY OF PRESENT ILLNESS
[de-identified] : 69M presents for follow-up of HTN, HLD, DM2. For HTN, BP is controlled on ramipril. For HLD, he is on atorvastatin 20mg and due for bloodwork. For DM2, he stopped lantus and is taking metformin 2000mg and onglyza 5mg.   Notes over the past 2 weeks, he has felt a mass above right side of sternum.

## 2024-06-06 LAB
ALBUMIN SERPL ELPH-MCNC: 4.8 G/DL
ALP BLD-CCNC: 84 U/L
ALT SERPL-CCNC: 18 U/L
ANION GAP SERPL CALC-SCNC: 14 MMOL/L
AST SERPL-CCNC: 18 U/L
BASOPHILS # BLD AUTO: 0.08 K/UL
BASOPHILS NFR BLD AUTO: 0.7 %
BILIRUB SERPL-MCNC: 0.3 MG/DL
BUN SERPL-MCNC: 22 MG/DL
CALCIUM SERPL-MCNC: 9.7 MG/DL
CHLORIDE SERPL-SCNC: 100 MMOL/L
CHOLEST SERPL-MCNC: 116 MG/DL
CO2 SERPL-SCNC: 25 MMOL/L
CREAT SERPL-MCNC: 0.92 MG/DL
CREAT SPEC-SCNC: 150 MG/DL
EGFR: 89 ML/MIN/1.73M2
EOSINOPHIL # BLD AUTO: 0.32 K/UL
EOSINOPHIL NFR BLD AUTO: 2.9 %
ESTIMATED AVERAGE GLUCOSE: 217 MG/DL
FERRITIN SERPL-MCNC: 20 NG/ML
GLUCOSE SERPL-MCNC: 241 MG/DL
HBA1C MFR BLD HPLC: 9.2 %
HCT VFR BLD CALC: 40.7 %
HDLC SERPL-MCNC: 33 MG/DL
HGB BLD-MCNC: 12.3 G/DL
IMM GRANULOCYTES NFR BLD AUTO: 0.6 %
IRON SATN MFR SERPL: 16 %
IRON SERPL-MCNC: 73 UG/DL
LDLC SERPL CALC-MCNC: 60 MG/DL
LYMPHOCYTES # BLD AUTO: 3.03 K/UL
LYMPHOCYTES NFR BLD AUTO: 27.2 %
MAN DIFF?: NORMAL
MCHC RBC-ENTMCNC: 25.9 PG
MCHC RBC-ENTMCNC: 30.2 GM/DL
MCV RBC AUTO: 85.9 FL
MICROALBUMIN 24H UR DL<=1MG/L-MCNC: <1.2 MG/DL
MICROALBUMIN/CREAT 24H UR-RTO: NORMAL MG/G
MONOCYTES # BLD AUTO: 0.82 K/UL
MONOCYTES NFR BLD AUTO: 7.4 %
NEUTROPHILS # BLD AUTO: 6.83 K/UL
NEUTROPHILS NFR BLD AUTO: 61.2 %
NONHDLC SERPL-MCNC: 83 MG/DL
PLATELET # BLD AUTO: 290 K/UL
POTASSIUM SERPL-SCNC: 5 MMOL/L
PROT SERPL-MCNC: 7.3 G/DL
RBC # BLD: 4.74 M/UL
RBC # FLD: 14.6 %
SODIUM SERPL-SCNC: 139 MMOL/L
T3 SERPL-MCNC: 84 NG/DL
T4 FREE SERPL-MCNC: 1.1 NG/DL
TIBC SERPL-MCNC: 450 UG/DL
TRIGL SERPL-MCNC: 123 MG/DL
TSH SERPL-ACNC: 4.14 UIU/ML
UIBC SERPL-MCNC: 377 UG/DL
WBC # FLD AUTO: 11.15 K/UL

## 2024-06-06 RX ORDER — EMPAGLIFLOZIN 10 MG/1
10 TABLET, FILM COATED ORAL
Qty: 90 | Refills: 0 | Status: ACTIVE | COMMUNITY
Start: 2024-06-06 | End: 1900-01-01

## 2024-06-07 ENCOUNTER — APPOINTMENT (OUTPATIENT)
Dept: ULTRASOUND IMAGING | Facility: CLINIC | Age: 69
End: 2024-06-07

## 2024-06-07 ENCOUNTER — OUTPATIENT (OUTPATIENT)
Dept: OUTPATIENT SERVICES | Facility: HOSPITAL | Age: 69
LOS: 1 days | End: 2024-06-07
Payer: MEDICARE

## 2024-06-07 DIAGNOSIS — R22.1 LOCALIZED SWELLING, MASS AND LUMP, NECK: ICD-10-CM

## 2024-06-07 PROCEDURE — 76536 US EXAM OF HEAD AND NECK: CPT

## 2024-06-07 PROCEDURE — 76536 US EXAM OF HEAD AND NECK: CPT | Mod: 26

## 2024-06-17 DIAGNOSIS — R59.0 LOCALIZED ENLARGED LYMPH NODES: ICD-10-CM

## 2024-06-24 ENCOUNTER — APPOINTMENT (OUTPATIENT)
Dept: CT IMAGING | Facility: CLINIC | Age: 69
End: 2024-06-24

## 2024-06-24 ENCOUNTER — OUTPATIENT (OUTPATIENT)
Dept: OUTPATIENT SERVICES | Facility: HOSPITAL | Age: 69
LOS: 1 days | End: 2024-06-24
Payer: MEDICARE

## 2024-06-24 DIAGNOSIS — R59.0 LOCALIZED ENLARGED LYMPH NODES: ICD-10-CM

## 2024-06-24 PROCEDURE — 70491 CT SOFT TISSUE NECK W/DYE: CPT | Mod: 26

## 2024-06-24 PROCEDURE — 70491 CT SOFT TISSUE NECK W/DYE: CPT

## 2024-09-03 ENCOUNTER — APPOINTMENT (OUTPATIENT)
Dept: INTERNAL MEDICINE | Facility: CLINIC | Age: 69
End: 2024-09-03
Payer: MEDICARE

## 2024-09-03 VITALS
TEMPERATURE: 98.1 F | HEART RATE: 96 BPM | SYSTOLIC BLOOD PRESSURE: 120 MMHG | OXYGEN SATURATION: 97 % | DIASTOLIC BLOOD PRESSURE: 72 MMHG | HEIGHT: 72 IN | RESPIRATION RATE: 15 BRPM | BODY MASS INDEX: 22.35 KG/M2 | WEIGHT: 165 LBS

## 2024-09-03 DIAGNOSIS — I10 ESSENTIAL (PRIMARY) HYPERTENSION: ICD-10-CM

## 2024-09-03 DIAGNOSIS — E78.5 HYPERLIPIDEMIA, UNSPECIFIED: ICD-10-CM

## 2024-09-03 DIAGNOSIS — L08.9 LOCAL INFECTION OF THE SKIN AND SUBCUTANEOUS TISSUE, UNSPECIFIED: ICD-10-CM

## 2024-09-03 DIAGNOSIS — E11.9 TYPE 2 DIABETES MELLITUS W/OUT COMPLICATIONS: ICD-10-CM

## 2024-09-03 PROCEDURE — 99214 OFFICE O/P EST MOD 30 MIN: CPT

## 2024-09-03 PROCEDURE — G2211 COMPLEX E/M VISIT ADD ON: CPT

## 2024-09-03 RX ORDER — CEFPROZIL 500 MG/1
500 TABLET ORAL
Qty: 20 | Refills: 0 | Status: ACTIVE | COMMUNITY
Start: 2024-09-03 | End: 1900-01-01

## 2024-09-03 NOTE — REVIEW OF SYSTEMS
[Fever] : no fever [Chills] : no chills [Night Sweats] : no night sweats [Chest Pain] : no chest pain [Palpitations] : no palpitations [Lower Ext Edema] : no lower extremity edema [Shortness Of Breath] : no shortness of breath [Wheezing] : no wheezing [Cough] : no cough [Dyspnea on Exertion] : not dyspnea on exertion [Abdominal Pain] : no abdominal pain [Nausea] : no nausea [Constipation] : no constipation [Diarrhea] : no diarrhea [Vomiting] : no vomiting [Dysuria] : no dysuria [Skin Rash] : skin rash

## 2024-09-03 NOTE — ASSESSMENT
[FreeTextEntry1] : Skin infection: Start cefprozil 500mg BID. Referred to [facility] wound care center.  HTN: Bp controlled. Continue ramipril 5mg BID.  HLD: Check lipids. Continue atorvastatin 20mg daily.  DM2: Check a1c, alb/cr. On jardiance 10mg daily, metformin 1000mg BID. Urged him to restart lantus 12U QHS based on previous A1c.

## 2024-09-03 NOTE — HISTORY OF PRESENT ILLNESS
[de-identified] : 69M presents for follow-up of HTN, HLD, DM2. For DM2, HLD, he is due for bloodwork. For HTN, he is on ramipril.    Has developed ulcer on left 1st toe while in [place]. Was on antibiotics which haven't helped.

## 2024-09-03 NOTE — PHYSICAL EXAM
[No Acute Distress] : no acute distress [Normal Sclera/Conjunctiva] : normal sclera/conjunctiva [PERRL] : pupils equal round and reactive to light [EOMI] : extraocular movements intact [de-identified] : ulcer left 1st toe

## 2024-09-04 LAB
ALBUMIN SERPL ELPH-MCNC: 4.4 G/DL
ALP BLD-CCNC: 80 U/L
ALT SERPL-CCNC: 11 U/L
ANION GAP SERPL CALC-SCNC: 15 MMOL/L
AST SERPL-CCNC: 16 U/L
BASOPHILS # BLD AUTO: 0.08 K/UL
BASOPHILS NFR BLD AUTO: 0.8 %
BILIRUB SERPL-MCNC: 0.3 MG/DL
BUN SERPL-MCNC: 21 MG/DL
CALCIUM SERPL-MCNC: 9.8 MG/DL
CHLORIDE SERPL-SCNC: 98 MMOL/L
CHOLEST SERPL-MCNC: 157 MG/DL
CO2 SERPL-SCNC: 26 MMOL/L
CREAT SERPL-MCNC: 0.89 MG/DL
CREAT SPEC-SCNC: 71 MG/DL
EGFR: 92 ML/MIN/1.73M2
EOSINOPHIL # BLD AUTO: 0.31 K/UL
EOSINOPHIL NFR BLD AUTO: 3.1 %
ESTIMATED AVERAGE GLUCOSE: 223 MG/DL
FERRITIN SERPL-MCNC: 54 NG/ML
FOLATE SERPL-MCNC: 15.7 NG/ML
GLUCOSE SERPL-MCNC: 121 MG/DL
HBA1C MFR BLD HPLC: 9.4 %
HCT VFR BLD CALC: 41.3 %
HDLC SERPL-MCNC: 35 MG/DL
HGB BLD-MCNC: 12.7 G/DL
IMM GRANULOCYTES NFR BLD AUTO: 0.5 %
IRON SATN MFR SERPL: 12 %
IRON SERPL-MCNC: 38 UG/DL
LDLC SERPL CALC-MCNC: 100 MG/DL
LYMPHOCYTES # BLD AUTO: 2.71 K/UL
LYMPHOCYTES NFR BLD AUTO: 27 %
MAN DIFF?: NORMAL
MCHC RBC-ENTMCNC: 26.2 PG
MCHC RBC-ENTMCNC: 30.8 GM/DL
MCV RBC AUTO: 85.2 FL
MICROALBUMIN 24H UR DL<=1MG/L-MCNC: <1.2 MG/DL
MICROALBUMIN/CREAT 24H UR-RTO: NORMAL MG/G
MONOCYTES # BLD AUTO: 0.84 K/UL
MONOCYTES NFR BLD AUTO: 8.4 %
NEUTROPHILS # BLD AUTO: 6.05 K/UL
NEUTROPHILS NFR BLD AUTO: 60.2 %
NONHDLC SERPL-MCNC: 122 MG/DL
PLATELET # BLD AUTO: 299 K/UL
POTASSIUM SERPL-SCNC: 4.3 MMOL/L
PROT SERPL-MCNC: 7.4 G/DL
RBC # BLD: 4.85 M/UL
RBC # FLD: 15.3 %
SODIUM SERPL-SCNC: 139 MMOL/L
TIBC SERPL-MCNC: 325 UG/DL
TRIGL SERPL-MCNC: 118 MG/DL
UIBC SERPL-MCNC: 287 UG/DL
VIT B12 SERPL-MCNC: 490 PG/ML
WBC # FLD AUTO: 10.04 K/UL

## 2024-09-04 RX ORDER — FERROUS SULFATE 137(45) MG
45 TABLET, EXTENDED RELEASE ORAL
Qty: 30 | Refills: 2 | Status: ACTIVE | COMMUNITY
Start: 2024-09-04 | End: 1900-01-01

## 2024-09-10 ENCOUNTER — NON-APPOINTMENT (OUTPATIENT)
Age: 69
End: 2024-09-10

## 2024-09-11 ENCOUNTER — APPOINTMENT (OUTPATIENT)
Dept: WOUND CARE | Facility: HOSPITAL | Age: 69
End: 2024-09-11

## 2024-09-11 ENCOUNTER — INPATIENT (INPATIENT)
Facility: HOSPITAL | Age: 69
LOS: 5 days | Discharge: HOME | DRG: 639 | End: 2024-09-17
Attending: INTERNAL MEDICINE | Admitting: INTERNAL MEDICINE
Payer: MEDICARE

## 2024-09-11 ENCOUNTER — OUTPATIENT (OUTPATIENT)
Dept: OUTPATIENT SERVICES | Facility: HOSPITAL | Age: 69
LOS: 1 days | Discharge: ANOTHER HOSPITAL | End: 2024-09-11
Payer: MEDICARE

## 2024-09-11 VITALS
DIASTOLIC BLOOD PRESSURE: 71 MMHG | HEIGHT: 72 IN | OXYGEN SATURATION: 96 % | SYSTOLIC BLOOD PRESSURE: 155 MMHG | RESPIRATION RATE: 20 BRPM | TEMPERATURE: 98 F | WEIGHT: 169.98 LBS | HEART RATE: 61 BPM

## 2024-09-11 VITALS
HEIGHT: 72 IN | HEART RATE: 81 BPM | TEMPERATURE: 98 F | OXYGEN SATURATION: 97 % | DIASTOLIC BLOOD PRESSURE: 69 MMHG | BODY MASS INDEX: 22.35 KG/M2 | WEIGHT: 165 LBS | SYSTOLIC BLOOD PRESSURE: 129 MMHG | RESPIRATION RATE: 16 BRPM

## 2024-09-11 DIAGNOSIS — L97.509 TYPE 2 DIABETES MELLITUS WITH FOOT ULCER: ICD-10-CM

## 2024-09-11 DIAGNOSIS — R59.0 LOCALIZED ENLARGED LYMPH NODES: ICD-10-CM

## 2024-09-11 DIAGNOSIS — L97.516 NON-PRESSURE CHRONIC ULCER OF OTHER PART OF RIGHT FOOT WITH BONE INVOLVEMENT WITHOUT EVIDENCE OF NECROSIS: ICD-10-CM

## 2024-09-11 DIAGNOSIS — L03.116 CELLULITIS OF LEFT LOWER LIMB: ICD-10-CM

## 2024-09-11 DIAGNOSIS — L97.501 NON-PRESSURE CHRONIC ULCER OF OTHER PART OF UNSPECIFIED FOOT LIMITED TO BREAKDOWN OF SKIN: ICD-10-CM

## 2024-09-11 DIAGNOSIS — E11.621 TYPE 2 DIABETES MELLITUS WITH FOOT ULCER: ICD-10-CM

## 2024-09-11 DIAGNOSIS — L97.524 NON-PRESSURE CHRONIC ULCER OF OTHER PART OF LEFT FOOT WITH NECROSIS OF BONE: ICD-10-CM

## 2024-09-11 LAB
ALBUMIN SERPL ELPH-MCNC: 3.8 G/DL — SIGNIFICANT CHANGE UP (ref 3.3–5)
ALP SERPL-CCNC: 73 U/L — SIGNIFICANT CHANGE UP (ref 40–120)
ALT FLD-CCNC: 23 U/L — SIGNIFICANT CHANGE UP (ref 12–78)
ANION GAP SERPL CALC-SCNC: 8 MMOL/L — SIGNIFICANT CHANGE UP (ref 5–17)
AST SERPL-CCNC: 16 U/L — SIGNIFICANT CHANGE UP (ref 15–37)
BASOPHILS # BLD AUTO: 0.12 K/UL — SIGNIFICANT CHANGE UP (ref 0–0.2)
BASOPHILS NFR BLD AUTO: 1.3 % — SIGNIFICANT CHANGE UP (ref 0–2)
BILIRUB SERPL-MCNC: 0.2 MG/DL — SIGNIFICANT CHANGE UP (ref 0.2–1.2)
BUN SERPL-MCNC: 14 MG/DL — SIGNIFICANT CHANGE UP (ref 7–23)
CALCIUM SERPL-MCNC: 9.1 MG/DL — SIGNIFICANT CHANGE UP (ref 8.5–10.1)
CHLORIDE SERPL-SCNC: 105 MMOL/L — SIGNIFICANT CHANGE UP (ref 96–108)
CO2 SERPL-SCNC: 26 MMOL/L — SIGNIFICANT CHANGE UP (ref 22–31)
CREAT SERPL-MCNC: 0.75 MG/DL — SIGNIFICANT CHANGE UP (ref 0.5–1.3)
EGFR: 97 ML/MIN/1.73M2 — SIGNIFICANT CHANGE UP
EOSINOPHIL # BLD AUTO: 0.53 K/UL — HIGH (ref 0–0.5)
EOSINOPHIL NFR BLD AUTO: 5.9 % — SIGNIFICANT CHANGE UP (ref 0–6)
ERYTHROCYTE [SEDIMENTATION RATE] IN BLOOD: 20 MM/HR — SIGNIFICANT CHANGE UP (ref 0–20)
GLUCOSE SERPL-MCNC: 137 MG/DL — HIGH (ref 70–99)
HCT VFR BLD CALC: 39.9 % — SIGNIFICANT CHANGE UP (ref 39–50)
HGB BLD-MCNC: 12.3 G/DL — LOW (ref 13–17)
IMM GRANULOCYTES NFR BLD AUTO: 0.3 % — SIGNIFICANT CHANGE UP (ref 0–0.9)
LYMPHOCYTES # BLD AUTO: 3.13 K/UL — SIGNIFICANT CHANGE UP (ref 1–3.3)
LYMPHOCYTES # BLD AUTO: 34.7 % — SIGNIFICANT CHANGE UP (ref 13–44)
MCHC RBC-ENTMCNC: 25.9 PG — LOW (ref 27–34)
MCHC RBC-ENTMCNC: 30.8 GM/DL — LOW (ref 32–36)
MCV RBC AUTO: 84 FL — SIGNIFICANT CHANGE UP (ref 80–100)
MONOCYTES # BLD AUTO: 0.78 K/UL — SIGNIFICANT CHANGE UP (ref 0–0.9)
MONOCYTES NFR BLD AUTO: 8.6 % — SIGNIFICANT CHANGE UP (ref 2–14)
NEUTROPHILS # BLD AUTO: 4.44 K/UL — SIGNIFICANT CHANGE UP (ref 1.8–7.4)
NEUTROPHILS NFR BLD AUTO: 49.2 % — SIGNIFICANT CHANGE UP (ref 43–77)
NRBC # BLD: 0 /100 WBCS — SIGNIFICANT CHANGE UP (ref 0–0)
PLATELET # BLD AUTO: 354 K/UL — SIGNIFICANT CHANGE UP (ref 150–400)
POTASSIUM SERPL-MCNC: 4.2 MMOL/L — SIGNIFICANT CHANGE UP (ref 3.5–5.3)
POTASSIUM SERPL-SCNC: 4.2 MMOL/L — SIGNIFICANT CHANGE UP (ref 3.5–5.3)
PROT SERPL-MCNC: 7.4 G/DL — SIGNIFICANT CHANGE UP (ref 6–8.3)
RBC # BLD: 4.75 M/UL — SIGNIFICANT CHANGE UP (ref 4.2–5.8)
RBC # FLD: 14.6 % — HIGH (ref 10.3–14.5)
SODIUM SERPL-SCNC: 139 MMOL/L — SIGNIFICANT CHANGE UP (ref 135–145)
WBC # BLD: 9.03 K/UL — SIGNIFICANT CHANGE UP (ref 3.8–10.5)
WBC # FLD AUTO: 9.03 K/UL — SIGNIFICANT CHANGE UP (ref 3.8–10.5)

## 2024-09-11 PROCEDURE — 99285 EMERGENCY DEPT VISIT HI MDM: CPT

## 2024-09-11 PROCEDURE — 73630 X-RAY EXAM OF FOOT: CPT | Mod: 26,LT

## 2024-09-11 PROCEDURE — 87186 SC STD MICRODIL/AGAR DIL: CPT

## 2024-09-11 PROCEDURE — 87077 CULTURE AEROBIC IDENTIFY: CPT

## 2024-09-11 PROCEDURE — 87070 CULTURE OTHR SPECIMN AEROBIC: CPT

## 2024-09-11 PROCEDURE — G0463: CPT

## 2024-09-11 RX ORDER — PIPERACILLIN SODIUM AND TAZOBACTAM SODIUM 3; .375 G/15ML; G/15ML
3.38 INJECTION, POWDER, FOR SOLUTION INTRAVENOUS ONCE
Refills: 0 | Status: COMPLETED | OUTPATIENT
Start: 2024-09-11 | End: 2024-09-11

## 2024-09-11 RX ORDER — EMPAGLIFLOZIN 10 MG/1
10 TABLET, FILM COATED ORAL
Refills: 0 | Status: ACTIVE | COMMUNITY

## 2024-09-11 RX ORDER — VANCOMYCIN/0.9 % SOD CHLORIDE 1.75G/25
1000 PLASTIC BAG, INJECTION (ML) INTRAVENOUS ONCE
Refills: 0 | Status: COMPLETED | OUTPATIENT
Start: 2024-09-11 | End: 2024-09-11

## 2024-09-11 RX ADMIN — Medication 250 MILLIGRAM(S): at 19:56

## 2024-09-11 RX ADMIN — PIPERACILLIN SODIUM AND TAZOBACTAM SODIUM 3.38 GRAM(S): 3; .375 INJECTION, POWDER, FOR SOLUTION INTRAVENOUS at 22:33

## 2024-09-11 RX ADMIN — PIPERACILLIN SODIUM AND TAZOBACTAM SODIUM 200 GRAM(S): 3; .375 INJECTION, POWDER, FOR SOLUTION INTRAVENOUS at 22:03

## 2024-09-11 NOTE — CONSULT NOTE ADULT - SUBJECTIVE AND OBJECTIVE BOX
HPI:      69y year old Male seen at [facility] ED for left great toe wound. Patient was in [place] and tried to trim a pine tree and stepped on a pine needle in July about 2 months ago,  that he removed but some was possibly retained. Patient is diabetic and noticed redness and swelling to the left great toe. Patient's spouse is a neurologist and lanced the abscess in [place] and was given oral Augmentin and Flagyl. Patient noticed the wound and infection resolving. Patient returned to the [facility] recently and was seen by an outside podiatrist who advised patient to visit the [facility]. Patient left great toe was noted to be swollen and draining with the wound probing down to bone.   Denies any fever, chills, nausea, vomiting, chest pain, shortness of breath, or calf pain at this time.    REVIEW OF SYSTEMS    PAST MEDICAL & SURGICAL HISTORY:      Allergies    No Known Allergies    Intolerances        MEDICATIONS  (STANDING):  piperacillin/tazobactam IVPB... 3.375 Gram(s) IV Intermittent once  vancomycin  IVPB. 1000 milliGRAM(s) IV Intermittent once    MEDICATIONS  (PRN):      Social History:      FAMILY HISTORY:      Vital Signs Last 24 Hrs  T(C): 36.8 (11 Sep 2024 17:39), Max: 36.8 (11 Sep 2024 17:39)  T(F): 98.2 (11 Sep 2024 17:39), Max: 98.2 (11 Sep 2024 17:39)  HR: 61 (11 Sep 2024 17:39) (61 - 61)  BP: 155/71 (11 Sep 2024 17:39) (155/71 - 155/71)  BP(mean): --  RR: 20 (11 Sep 2024 17:39) (20 - 20)  SpO2: 96% (11 Sep 2024 17:39) (96% - 96%)    Parameters below as of 11 Sep 2024 17:39  Patient On (Oxygen Delivery Method): room air, 97        PHYSICAL EXAM:  Vascular: DP & PT palpable bilaterally, Capillary refill 3 seconds, Digital hair present bilaterally  Neurological: Light touch sensation diminished to digits   Musculoskeletal: 5/5 strength in all quadrants bilaterally, AJ & STJ ROM intact  Dermatological: 1.0cm x 2.0cm down to bone with edema, erythema, and serous drainage,  no fluctuance, no malodor, no proximal streaking at this time          Imaging: ----------  Pending X- rays and MRI

## 2024-09-11 NOTE — CONSULT NOTE ADULT - PROBLEM SELECTOR RECOMMENDATION 9
Patient's wound with edema, erythema and drainage, high suspicion of OM  Recommend X- rays and MRI to r/o OM  Recommend vascular consult for diabetic ulcer   Recommend ID consult   Patient will possibly need surgical intervention   Will c/w local wound care and offloading

## 2024-09-11 NOTE — ED ADULT TRIAGE NOTE - CHIEF COMPLAINT QUOTE
I have been treated by wound center for ulcer left foot and now I was told to come here for admission, iv abx, and surgical intervention.  I have dm and neuropathy, so I have no pain.  I was told that I would need surgery though.

## 2024-09-11 NOTE — ED ADULT NURSE NOTE - OBJECTIVE STATEMENT
Pt received in room [facility]14, pt is A+Ox4 and independent with ambulation and ADLs at baseline. Pt is presenting with a chief complaint of L great toe wound. Pt has a hx of diabetes and got a wound on the underside of the L great toe in [place] 2 months ago after stepping on a pine needle -  s/p PO Augmentin and Flagyl. Pt now presenting to the ED for new swelling and draining as advised by outpt MD. Pt denies cp/sob/palpitations. Pt denies nausea/vomiting/diarrhea/constipation. Pt denies any pain or fevers. IVL placed with labs drawn and sent as ordered, IV abx initiated as ordered. Xray obtained.

## 2024-09-11 NOTE — ED PROVIDER NOTE - CLINICAL SUMMARY MEDICAL DECISION MAKING FREE TEXT BOX
69-year-old male history of diabetes chief complaints of left first big toe ulcer.  Patient sustained this injury while he was in [place] slowly getting worse no systemic symptoms.    GENERAL: Awake, alert, NAD  LUNGS: non labored breathing   ABDOMEN: Soft, , non tender, non distended, no rebound, no guarding  BACK: No midline spinal tenderness, no CVA tenderness  EXT:small ulcer to first left big toe, minimal drainage, mild erythema w/ warmth   NEURO: A&Ox3. Moving all extremities.  PSYCH: Normal affect.   Given history and physical exam clinical concern for osteomyelitis to be admitted per wound clinic.

## 2024-09-12 LAB
A1C WITH ESTIMATED AVERAGE GLUCOSE RESULT: 9.3 % — HIGH (ref 4–5.6)
ALBUMIN SERPL ELPH-MCNC: 3.4 G/DL — SIGNIFICANT CHANGE UP (ref 3.3–5)
ALP SERPL-CCNC: 76 U/L — SIGNIFICANT CHANGE UP (ref 40–120)
ALT FLD-CCNC: 22 U/L — SIGNIFICANT CHANGE UP (ref 12–78)
ANION GAP SERPL CALC-SCNC: 2 MMOL/L — LOW (ref 5–17)
AST SERPL-CCNC: 14 U/L — LOW (ref 15–37)
BILIRUB SERPL-MCNC: 0.5 MG/DL — SIGNIFICANT CHANGE UP (ref 0.2–1.2)
BUN SERPL-MCNC: 11 MG/DL — SIGNIFICANT CHANGE UP (ref 7–23)
CALCIUM SERPL-MCNC: 9.2 MG/DL — SIGNIFICANT CHANGE UP (ref 8.5–10.1)
CHLORIDE SERPL-SCNC: 104 MMOL/L — SIGNIFICANT CHANGE UP (ref 96–108)
CHOLEST SERPL-MCNC: 99 MG/DL — SIGNIFICANT CHANGE UP
CO2 SERPL-SCNC: 32 MMOL/L — HIGH (ref 22–31)
CREAT SERPL-MCNC: 0.76 MG/DL — SIGNIFICANT CHANGE UP (ref 0.5–1.3)
CRP SERPL-MCNC: <3 MG/L — SIGNIFICANT CHANGE UP
CRP SERPL-MCNC: <3 MG/L — SIGNIFICANT CHANGE UP
EGFR: 97 ML/MIN/1.73M2 — SIGNIFICANT CHANGE UP
ERYTHROCYTE [SEDIMENTATION RATE] IN BLOOD: 18 MM/HR — SIGNIFICANT CHANGE UP (ref 0–20)
ESTIMATED AVERAGE GLUCOSE: 220 MG/DL — HIGH (ref 68–114)
FERRITIN SERPL-MCNC: 38 NG/ML — SIGNIFICANT CHANGE UP (ref 30–400)
GLUCOSE SERPL-MCNC: 208 MG/DL — HIGH (ref 70–99)
HCT VFR BLD CALC: 40 % — SIGNIFICANT CHANGE UP (ref 39–50)
HDLC SERPL-MCNC: 32 MG/DL — LOW
HGB BLD-MCNC: 12.1 G/DL — LOW (ref 13–17)
IRON SATN MFR SERPL: 21 % — SIGNIFICANT CHANGE UP (ref 16–55)
IRON SATN MFR SERPL: 68 UG/DL — SIGNIFICANT CHANGE UP (ref 45–165)
LDH SERPL L TO P-CCNC: 206 U/L — SIGNIFICANT CHANGE UP (ref 50–242)
LIPID PNL WITH DIRECT LDL SERPL: 53 MG/DL — SIGNIFICANT CHANGE UP
MCHC RBC-ENTMCNC: 25.4 PG — LOW (ref 27–34)
MCHC RBC-ENTMCNC: 30.3 GM/DL — LOW (ref 32–36)
MCV RBC AUTO: 84 FL — SIGNIFICANT CHANGE UP (ref 80–100)
NON HDL CHOLESTEROL: 67 MG/DL — SIGNIFICANT CHANGE UP
NRBC # BLD: 0 /100 WBCS — SIGNIFICANT CHANGE UP (ref 0–0)
PLATELET # BLD AUTO: 360 K/UL — SIGNIFICANT CHANGE UP (ref 150–400)
POTASSIUM SERPL-MCNC: 4.5 MMOL/L — SIGNIFICANT CHANGE UP (ref 3.5–5.3)
POTASSIUM SERPL-SCNC: 4.5 MMOL/L — SIGNIFICANT CHANGE UP (ref 3.5–5.3)
PROT SERPL-MCNC: 7.3 G/DL — SIGNIFICANT CHANGE UP (ref 6–8.3)
RBC # BLD: 4.62 M/UL — SIGNIFICANT CHANGE UP (ref 4.2–5.8)
RBC # BLD: 4.76 M/UL — SIGNIFICANT CHANGE UP (ref 4.2–5.8)
RBC # FLD: 14.6 % — HIGH (ref 10.3–14.5)
RETICS #: 51.3 K/UL — SIGNIFICANT CHANGE UP (ref 25–125)
RETICS/RBC NFR: 1.1 % — SIGNIFICANT CHANGE UP (ref 0.5–2.5)
SODIUM SERPL-SCNC: 138 MMOL/L — SIGNIFICANT CHANGE UP (ref 135–145)
T4 AB SER-ACNC: 7.3 UG/DL — SIGNIFICANT CHANGE UP (ref 4.6–12)
TIBC SERPL-MCNC: 323 UG/DL — SIGNIFICANT CHANGE UP (ref 220–430)
TRIGL SERPL-MCNC: 67 MG/DL — SIGNIFICANT CHANGE UP
TSH SERPL-MCNC: 3.57 UIU/ML — SIGNIFICANT CHANGE UP (ref 0.36–3.74)
UIBC SERPL-MCNC: 255 UG/DL — SIGNIFICANT CHANGE UP (ref 110–370)
WBC # BLD: 8.52 K/UL — SIGNIFICANT CHANGE UP (ref 3.8–10.5)
WBC # FLD AUTO: 8.52 K/UL — SIGNIFICANT CHANGE UP (ref 3.8–10.5)

## 2024-09-12 PROCEDURE — 99222 1ST HOSP IP/OBS MODERATE 55: CPT

## 2024-09-12 PROCEDURE — 93010 ELECTROCARDIOGRAM REPORT: CPT

## 2024-09-12 PROCEDURE — 73720 MRI LWR EXTREMITY W/O&W/DYE: CPT | Mod: 26,LT

## 2024-09-12 PROCEDURE — 99232 SBSQ HOSP IP/OBS MODERATE 35: CPT | Mod: 57

## 2024-09-12 RX ORDER — RDII 250 MG CAPSULE
250
Refills: 0 | Status: DISCONTINUED | OUTPATIENT
Start: 2024-09-12 | End: 2024-09-13

## 2024-09-12 RX ORDER — INSULIN GLARGINE 100 [IU]/ML
8 INJECTION, SOLUTION SUBCUTANEOUS AT BEDTIME
Refills: 0 | Status: DISCONTINUED | OUTPATIENT
Start: 2024-09-12 | End: 2024-09-13

## 2024-09-12 RX ORDER — LISINOPRIL 10 MG/1
20 TABLET ORAL EVERY 12 HOURS
Refills: 0 | Status: DISCONTINUED | OUTPATIENT
Start: 2024-09-12 | End: 2024-09-13

## 2024-09-12 RX ORDER — DEXTROSE 15 G/33 G
12.5 GEL IN PACKET (GRAM) ORAL ONCE
Refills: 0 | Status: DISCONTINUED | OUTPATIENT
Start: 2024-09-12 | End: 2024-09-13

## 2024-09-12 RX ORDER — METFORMIN HYDROCHLORIDE 850 MG/1
1 TABLET, FILM COATED ORAL
Refills: 0 | DISCHARGE

## 2024-09-12 RX ORDER — EMPAGLIFLOZIN 10 MG/1
1 TABLET, FILM COATED ORAL
Refills: 0 | DISCHARGE

## 2024-09-12 RX ORDER — HEPARIN SODIUM,BOVINE 1000/ML
5000 VIAL (ML) INJECTION EVERY 8 HOURS
Refills: 0 | Status: DISCONTINUED | OUTPATIENT
Start: 2024-09-12 | End: 2024-09-13

## 2024-09-12 RX ORDER — PIPERACILLIN SODIUM AND TAZOBACTAM SODIUM 3; .375 G/15ML; G/15ML
3.38 INJECTION, POWDER, FOR SOLUTION INTRAVENOUS ONCE
Refills: 0 | Status: COMPLETED | OUTPATIENT
Start: 2024-09-13 | End: 2024-09-13

## 2024-09-12 RX ORDER — DEXTROSE 15 G/33 G
15 GEL IN PACKET (GRAM) ORAL ONCE
Refills: 0 | Status: DISCONTINUED | OUTPATIENT
Start: 2024-09-12 | End: 2024-09-13

## 2024-09-12 RX ORDER — ASPIRIN 81 MG
0 TABLET, DELAYED RELEASE (ENTERIC COATED) ORAL
Refills: 0 | DISCHARGE

## 2024-09-12 RX ORDER — PIPERACILLIN SODIUM AND TAZOBACTAM SODIUM 3; .375 G/15ML; G/15ML
3.38 INJECTION, POWDER, FOR SOLUTION INTRAVENOUS ONCE
Refills: 0 | Status: COMPLETED | OUTPATIENT
Start: 2024-09-12 | End: 2024-09-12

## 2024-09-12 RX ORDER — VANCOMYCIN/0.9 % SOD CHLORIDE 1.75G/25
1000 PLASTIC BAG, INJECTION (ML) INTRAVENOUS EVERY 12 HOURS
Refills: 0 | Status: DISCONTINUED | OUTPATIENT
Start: 2024-09-12 | End: 2024-09-12

## 2024-09-12 RX ORDER — GLUCAGON INJECTION, SOLUTION 1 MG/.2ML
1 INJECTION, SOLUTION SUBCUTANEOUS ONCE
Refills: 0 | Status: DISCONTINUED | OUTPATIENT
Start: 2024-09-12 | End: 2024-09-13

## 2024-09-12 RX ORDER — CEFEPIME 2 G/1
2000 INJECTION, POWDER, FOR SOLUTION INTRAVENOUS EVERY 12 HOURS
Refills: 0 | Status: DISCONTINUED | OUTPATIENT
Start: 2024-09-12 | End: 2024-09-12

## 2024-09-12 RX ORDER — SAXAGLIPTIN 5 MG/1
1 TABLET, FILM COATED ORAL
Refills: 0 | DISCHARGE

## 2024-09-12 RX ORDER — DEXTROSE 15 G/33 G
25 GEL IN PACKET (GRAM) ORAL ONCE
Refills: 0 | Status: DISCONTINUED | OUTPATIENT
Start: 2024-09-12 | End: 2024-09-13

## 2024-09-12 RX ORDER — PIPERACILLIN SODIUM AND TAZOBACTAM SODIUM 3; .375 G/15ML; G/15ML
3.38 INJECTION, POWDER, FOR SOLUTION INTRAVENOUS EVERY 8 HOURS
Refills: 0 | Status: DISCONTINUED | OUTPATIENT
Start: 2024-09-13 | End: 2024-09-13

## 2024-09-12 RX ORDER — RAMIPRIL 10 MG
1 CAPSULE ORAL
Refills: 0 | DISCHARGE

## 2024-09-12 RX ADMIN — Medication 5000 UNIT(S): at 06:06

## 2024-09-12 RX ADMIN — LISINOPRIL 20 MILLIGRAM(S): 10 TABLET ORAL at 17:58

## 2024-09-12 RX ADMIN — Medication 20 MILLIGRAM(S): at 22:30

## 2024-09-12 RX ADMIN — Medication 5000 UNIT(S): at 16:04

## 2024-09-12 RX ADMIN — Medication 5000 UNIT(S): at 22:31

## 2024-09-12 RX ADMIN — CEFEPIME 100 MILLIGRAM(S): 2 INJECTION, POWDER, FOR SOLUTION INTRAVENOUS at 06:04

## 2024-09-12 RX ADMIN — Medication 1: at 07:35

## 2024-09-12 RX ADMIN — INSULIN GLARGINE 8 UNIT(S): 100 INJECTION, SOLUTION SUBCUTANEOUS at 22:30

## 2024-09-12 RX ADMIN — RDII 250 MG CAPSULE 250 MILLIGRAM(S): at 18:03

## 2024-09-12 RX ADMIN — LISINOPRIL 20 MILLIGRAM(S): 10 TABLET ORAL at 06:05

## 2024-09-12 RX ADMIN — PIPERACILLIN SODIUM AND TAZOBACTAM SODIUM 25 GRAM(S): 3; .375 INJECTION, POWDER, FOR SOLUTION INTRAVENOUS at 22:29

## 2024-09-12 RX ADMIN — Medication 6: at 17:59

## 2024-09-12 RX ADMIN — PIPERACILLIN SODIUM AND TAZOBACTAM SODIUM 200 GRAM(S): 3; .375 INJECTION, POWDER, FOR SOLUTION INTRAVENOUS at 16:04

## 2024-09-12 RX ADMIN — Medication 4: at 12:18

## 2024-09-12 RX ADMIN — Medication 250 MILLIGRAM(S): at 07:41

## 2024-09-12 NOTE — CONSULT NOTE ADULT - SUBJECTIVE AND OBJECTIVE BOX
SURGERY PA CONSULT NOTE:    CHIEF COMPLAINT:  Patient is a 69y old  Male who presents with a chief complaint of OM (12 Sep 2024 09:58)      HPI FROM ED:  69 Y.O.M with PMH of DM , HTN, HLD presented to ED was recently on vacation in [facility] and while he was there he stepped on a pine needle that was 2nd week of August  on the 1 st L toe according to him and it was getting red and swollen , his wife is a physician ( neurologist ) and his family member in [place] as well is physician and he was prescribed antibiotics , per podiatry note Augmentin and flagyl  then [place] to [place] he was still having the L toe ulcer and was not improving so he went to his PCP who prescribed for him cefprozil  he was seen by podiatry that recommended wound care center and on eval was found to have probing down to bone so was advised to come to ED  patient denied any fever chills , nausea , vomiting , no chills   no discharge from L big toe , no numbness or tingling   no urinary or bowel changes     INTERVAL HPI:   History as stated above. Patient denies any calf pain      PAST MEDICAL HISTORY:  PAST MEDICAL & SURGICAL HISTORY:      PAST SURGICAL HISTORY:    REVIEW OF SYSTEMS:  General/Constitutional: No acute distress, no headache, weakness, fevers, or chills   HEENT: Denies auditory or visual changes/disturbances, no vertigo, no throat pain, no dysphagia    Neck: Denies neck pain/stiffness, denies swelling/lumps/hoarseness   Lymphatic: Denies lumps or swelling in the axillae, groin, or neck bilaterally   Respiratory: Denies cough/hemoptysis, denies wheezing/SOB/dyspnea  Cardiac: Denies chest pain, palpitations  Abdomen: Denies abdominal bloating/fullness, nausea or vomiting, denies abdominal pain  Extremities: Denies sores, swelling, discoloration bilat UE/LE  Genitourinary: Denies urinary issues or complaints, denies dysuria/hematuria  Neuro: Denies weakness, paraesthesias, paralysis, syncope, loss of vision  Skin: Denies pruritus, pain, rashes  Psych: Denies hallucinations, visual disturbances, or depression    MEDICATIONS:  Home Medications:  atorvastatin 20 mg oral tablet: 1 tab(s) orally once a day (at bedtime) (12 Sep 2024 00:47)  insulin glargine 100 units/mL subcutaneous solution: 15 unit(s) subcutaneous once a day (at bedtime) (12 Sep 2024 00:57)  metFORMIN 1000 mg oral tablet: 1 tab(s) orally 2 times a day (12 Sep 2024 00:48)  Onglyza 5 mg oral tablet: 1 tab(s) orally once a day (12 Sep 2024 00:48)  ramipril 5 mg oral tablet: 1 tab(s) orally every 12 hours (12 Sep 2024 00:48)    MEDICATIONS  (STANDING):  atorvastatin 20 milliGRAM(s) Oral at bedtime  cefepime   IVPB 2000 milliGRAM(s) IV Intermittent every 12 hours  dextrose 5%. 1000 milliLiter(s) (100 mL/Hr) IV Continuous <Continuous>  dextrose 5%. 1000 milliLiter(s) (50 mL/Hr) IV Continuous <Continuous>  dextrose 50% Injectable 25 Gram(s) IV Push once  dextrose 50% Injectable 25 Gram(s) IV Push once  dextrose 50% Injectable 12.5 Gram(s) IV Push once  glucagon  Injectable 1 milliGRAM(s) IntraMuscular once  heparin   Injectable 5000 Unit(s) SubCutaneous every 8 hours  insulin glargine Injectable (LANTUS) 8 Unit(s) SubCutaneous at bedtime  insulin lispro (ADMELOG) corrective regimen sliding scale   SubCutaneous three times a day before meals  lisinopril 20 milliGRAM(s) Oral every 12 hours  vancomycin  IVPB 1000 milliGRAM(s) IV Intermittent every 12 hours    MEDICATIONS  (PRN):  dextrose Oral Gel 15 Gram(s) Oral once PRN Blood Glucose LESS THAN 70 milliGRAM(s)/deciliter      ALLERGIES:  Allergies    No Known Allergies    Intolerances        SOCIAL HISTORY:  Social History:    Smoking: Yes [ ]  No [ ]   ______pk yrs  ETOH  Yes [ ]  No [ ]  Social [ ]  DRUGS:  Yes [ ]  No [ ]  if so what______________    FAMILY HISTORY:  FAMILY HISTORY:      VITAL SIGNS:  Vital Signs Last 24 Hrs  T(C): 36.7 (12 Sep 2024 05:46), Max: 36.8 (11 Sep 2024 17:39)  T(F): 98.1 (12 Sep 2024 05:46), Max: 98.2 (11 Sep 2024 17:39)  HR: 52 (12 Sep 2024 07:32) (52 - 67)  BP: 142/83 (12 Sep 2024 07:32) (127/70 - 155/71)  BP(mean): --  RR: 17 (12 Sep 2024 07:32) (17 - 20)  SpO2: 98% (12 Sep 2024 07:32) (96% - 98%)    Parameters below as of 12 Sep 2024 07:32  Patient On (Oxygen Delivery Method): room air        PHYSICAL EXAM:  General: No acute distress, appears comfortable, well-groomed, appears stated age  Head, Eyes, Ears, Nose, Throat: Normal cephalic/atraumatic, anicteric, conjunctiva-non injected and moist, vision grossly intact, hearing grossly intact, no nasal discharge, ears and nose symmetrical and atraumatic.  Nasal, oral, and oropharyngeal mucosa pink moist with no evidence of ulceration  Neck: Supple, carotids have good upstroke, trachea in the midline, without JVD or thyromegaly  Lymphatic: No evidence of masses or lymphadenopathy in the head, neck, trunk, axillary, inguinal, or supraclavicular regions  Chest: Lungs are clear to P&A, no wheezing, no rales, no ronchi, with good inspiratory effort  Heart: Heart rhythm regular, no murmurs  Abdomen: Soft, non tender, good bowel sounds present in all four quadrants.  No guarding, rebound, and no peritoneal signs.  No evidence of hepatosplenomegaly.  No evidence of abdominal wall hernias.  Inguinal regions are unremarkable with no evidence of hernias.   Extremity: No swelling, or open sores, no gross deformities,  good range of motion, 2+ peripheral pulses bilat UE/LE, no edema,  negative Tana's sign, no lymphadenopathy  Neuro: Alert and oriented x3, motor and sensory intact  Psychiatric: Awake , alert, oriented x3 with an appropriate affect.   Skin: Good color, turgor, texture with no gross lesions, no eruptions, no rashes, no subcutaneous nodules and normal temperature.     LABS:                        12.1   8.52  )-----------( 360      ( 12 Sep 2024 08:46 )             40.0     09-12    138  |  104  |  11  ----------------------------<  208<H>  4.5   |  32<H>  |  0.76    Ca    9.2      12 Sep 2024 08:46    TPro  7.3  /  Alb  3.4  /  TBili  0.5  /  DBili  x   /  AST  14<L>  /  ALT  22  /  AlkPhos  76  09-12      Urinalysis Basic - ( 12 Sep 2024 08:46 )    Color: x / Appearance: x / SG: x / pH: x  Gluc: 208 mg/dL / Ketone: x  / Bili: x / Urobili: x   Blood: x / Protein: x / Nitrite: x   Leuk Esterase: x / RBC: x / WBC x   Sq Epi: x / Non Sq Epi: x / Bacteria: x      LIVER FUNCTIONS - ( 12 Sep 2024 08:46 )  Alb: 3.4 g/dL / Pro: 7.3 g/dL / ALK PHOS: 76 U/L / ALT: 22 U/L / AST: 14 U/L / GGT: x               RADIOLOGY & ADDITIONAL STUDIES:    ASSESSMENT:    PLAN: VASCULAR SURGERY PA CONSULT NOTE:    CHIEF COMPLAINT:  Patient is a 69y old  Male who presents with a chief complaint of OM (12 Sep 2024 09:58).    HPI FROM ED:  69 Y.O.M with PMH of DM , HTN, HLD presented to ED was recently on vacation in [facility] and while he was there he stepped on a pine needle that was 2nd week of August  on the 1 st L toe according to him and it was getting red and swollen , his wife is a physician (neurologist) and his family member in [place] as well is physician and he was prescribed antibiotics , per podiatry note Augmentin and flagyl  then [place] came [place] he was still having the L toe ulcer and was not improving so he went to his PCP who prescribed for him cefprozil  he was seen by podiatry that recommended wound care center and on eval was found to have probing down to bone so was advised to come to ED  patient denied any fever chills , nausea , vomiting , no chills   no discharge from L big toe , no numbness or tingling   no urinary or bowel changes     INTERVAL HPI:   History as stated above. Patient denies any calf pain. Denies any resting pain. Admits to oozing earlier but not now, not bleeding    PAST MEDICAL HISTORY:  DM  HTN  HLD    PAST SURGICAL HISTORY:  No pertinent surgical history    REVIEW OF SYSTEMS:  CONSTITUTIONAL: No weakness, fevers or chills  EYES/ENT: No visual changes;  No vertigo or throat pain   NECK: No pain or stiffness  RESPIRATORY: No cough, wheezing, hemoptysis; No shortness of breath  CARDIOVASCULAR: No chest pain or palpitations  GASTROINTESTINAL: No abdominal or epigastric pain. No nausea, vomiting, or hematemesis; No diarrhea or constipation. No melena or hematochezia.  GENITOURINARY: No dysuria, frequency or hematuria  NEUROLOGICAL: No numbness or weakness  SKIN: Left great toe wound    MEDICATIONS:  Home Medications:  atorvastatin 20 mg oral tablet: 1 tab(s) orally once a day (at bedtime) (12 Sep 2024 00:47)  insulin glargine 100 units/mL subcutaneous solution: 15 unit(s) subcutaneous once a day (at bedtime) (12 Sep 2024 00:57)  metFORMIN 1000 mg oral tablet: 1 tab(s) orally 2 times a day (12 Sep 2024 00:48)  Onglyza 5 mg oral tablet: 1 tab(s) orally once a day (12 Sep 2024 00:48)  ramipril 5 mg oral tablet: 1 tab(s) orally every 12 hours (12 Sep 2024 00:48)    MEDICATIONS  (STANDING):  atorvastatin 20 milliGRAM(s) Oral at bedtime  cefepime   IVPB 2000 milliGRAM(s) IV Intermittent every 12 hours  dextrose 5%. 1000 milliLiter(s) (100 mL/Hr) IV Continuous <Continuous>  dextrose 5%. 1000 milliLiter(s) (50 mL/Hr) IV Continuous <Continuous>  dextrose 50% Injectable 25 Gram(s) IV Push once  dextrose 50% Injectable 25 Gram(s) IV Push once  dextrose 50% Injectable 12.5 Gram(s) IV Push once  glucagon  Injectable 1 milliGRAM(s) IntraMuscular once  heparin   Injectable 5000 Unit(s) SubCutaneous every 8 hours  insulin glargine Injectable (LANTUS) 8 Unit(s) SubCutaneous at bedtime  insulin lispro (ADMELOG) corrective regimen sliding scale   SubCutaneous three times a day before meals  lisinopril 20 milliGRAM(s) Oral every 12 hours  vancomycin  IVPB 1000 milliGRAM(s) IV Intermittent every 12 hours    MEDICATIONS  (PRN):  dextrose Oral Gel 15 Gram(s) Oral once PRN Blood Glucose LESS THAN 70 milliGRAM(s)/deciliter    ALLERGIES:  No Known Allergies    SOCIAL HISTORY:  Lives at home  No cane or walker usage  Smoking: Former smoker 30 years smoker of 1-2 packs daily  ETOH: Denies  DRUGS: Denies    VITAL SIGNS:  Vital Signs Last 24 Hrs  T(C): 36.7 (12 Sep 2024 05:46), Max: 36.8 (11 Sep 2024 17:39)  T(F): 98.1 (12 Sep 2024 05:46), Max: 98.2 (11 Sep 2024 17:39)  HR: 52 (12 Sep 2024 07:32) (52 - 67)  BP: 142/83 (12 Sep 2024 07:32) (127/70 - 155/71)  BP(mean): --  RR: 17 (12 Sep 2024 07:32) (17 - 20)  SpO2: 98% (12 Sep 2024 07:32) (96% - 98%)    Parameters below as of 12 Sep 2024 07:32  Patient On (Oxygen Delivery Method): room air    PHYSICAL EXAM:  GENERAL: NAD, lying in bed comfortably  HEAD:  Atraumatic, normocephalic  EYES: EOMI, PERRLA, conjunctiva and sclera clear  NECK: Supple, trachea midline, no JVD  HEART: Regular rate and rhythm  LUNGS: Unlabored respirations.  ABDOMEN: Soft, nontender, nondistended  EXTREMITIES: 2+ peripheral pulses bilaterally. 1.0cm x 2.0cm wound with edema, erythema, and serous drainage,  no fluctuance, no malodor, no proximal streaking at this time  NERVOUS SYSTEM:  A&Ox3    LABS:                     12.1   8.52  )-----------( 360      ( 12 Sep 2024 08:46 )             40.0     138  |  104  |  11  ----------------------------<  208<H>  4.5   |  32<H>  |  0.76    Ca    9.2      12 Sep 2024 08:46    TPro  7.3  /  Alb  3.4  /  TBili  0.5  /  DBili  x   /  AST  14<L>  /  ALT  22  /  AlkPhos  76  09-12    Urinalysis Basic - ( 12 Sep 2024 08:46 )    Color: x / Appearance: x / SG: x / pH: x  Gluc: 208 mg/dL / Ketone: x  / Bili: x / Urobili: x   Blood: x / Protein: x / Nitrite: x   Leuk Esterase: x / RBC: x / WBC x   Sq Epi: x / Non Sq Epi: x / Bacteria: x    LIVER FUNCTIONS - ( 12 Sep 2024 08:46 )  Alb: 3.4 g/dL / Pro: 7.3 g/dL / ALK PHOS: 76 U/L / ALT: 22 U/L / AST: 14 U/L / GGT: x               ASSESSMENT:  70 yo male with pmhx of HTN, DM2, HLD admitted for nonhealing left hallus wound suspicious for OM. Vascular consulted for evaluations of possible PAD/clearance. Palpable PT/DP bilaterally. AFVSS    PLAN:  - Palpable PT/DP; ABIs not necessary   - With no claudication or rest pain  - F/u MRI  - Follow podiatry recommendations   - Patient cleared from vascular standpoint for podiatry to intervene   - Case discussed with Dr. Joesph Lerma

## 2024-09-12 NOTE — CONSULT NOTE ADULT - SUBJECTIVE AND OBJECTIVE BOX
Patient is a 69y old  Male who presents with a chief complaint of OM (12 Sep 2024 12:55)    Type: 2 DM DX 20 years, known complications DFU, no Endocrine PCP Dr Jefferson Bruno, Last seen 2 weeks ago, current a1c 9.3%, Rx home lantus 15 units @ HS, metformin 1000mg BID< onglyza 5mg daily, was previously taking GLP-1. uses CGM reports readings usually~160, have been running higher since coming back from [facility]. explained a1c and average BG, persistent hyperglycemia, risk of complications with toe wound, need for improved glycemic control. holding oral antihyperglycemic meds while inpatient, using MDII. reviewed CC diet and carb identification/portion control. verbal education and handouts provided.  diabetes education provided- A1c measure and BG targets  fasting, <180 2 hours postmeal. medication MOA and considerations/side effects, inhospital BGM frequency and insulin administration, s/s of hyperglycemia/hypoglycemia and management, glycemic control and preventing complications, consistent carb diet, balanced plate method, consistent meal planning. sick day management, provider f/u  Hx HTN, HLD    HPI:  69 Y.O.M with PMH of DM , HTN, HLD   presented to ED was recently on vacation in [facility] and while he was there he stepped on a pine needle that was 2nd week of August  on the 1 st L toe according to him and it was getting red and swollen , his wife is a physician ( neurologist ) and his family member in [facility] as well is physician and he was prescribed antibiotics , per podiatry note Augmentin and flagyl  then [place] came to [place] he was still having the L toe ulcer and was not improving so he went to his PCP who prescribed for him cefprozil  he was seen by podiatry that recommended wound care center and on eval was found to have probing down to bone so was advised to come to ED  patient denied any fever chills , nausea , vomiting , no chills   no discharge from L big toe , no numbness or tingling   no urinary or bowel changes    (12 Sep 2024 01:43)      PAST MEDICAL & SURGICAL HISTORY:    Allergies    No Known Allergies    Intolerances        MEDICATIONS  (STANDING):  atorvastatin 20 milliGRAM(s) Oral at bedtime  dextrose 5%. 1000 milliLiter(s) (100 mL/Hr) IV Continuous <Continuous>  dextrose 5%. 1000 milliLiter(s) (50 mL/Hr) IV Continuous <Continuous>  dextrose 50% Injectable 12.5 Gram(s) IV Push once  dextrose 50% Injectable 25 Gram(s) IV Push once  dextrose 50% Injectable 25 Gram(s) IV Push once  glucagon  Injectable 1 milliGRAM(s) IntraMuscular once  heparin   Injectable 5000 Unit(s) SubCutaneous every 8 hours  insulin glargine Injectable (LANTUS) 8 Unit(s) SubCutaneous at bedtime  insulin lispro (ADMELOG) corrective regimen sliding scale   SubCutaneous three times a day before meals  lisinopril 20 milliGRAM(s) Oral every 12 hours  piperacillin/tazobactam IVPB. 3.375 Gram(s) IV Intermittent once  piperacillin/tazobactam IVPB.- 3.375 Gram(s) IV Intermittent once  saccharomyces boulardii 250 milliGRAM(s) Oral two times a day

## 2024-09-12 NOTE — H&P ADULT - HISTORY OF PRESENT ILLNESS
69 Y.O.M with PMH of DM , HTN, HLD   presented to ED was recently on vacation in [facility] and while he was there he stepped on a pine needle that was 2nd week of August  on the 1 st L toe according to him and it was getting red and swollen , his wife is a physician ( neurologist ) and his family member in [place] as well is physician and he was prescribed antibiotics , per podiatry note Augmentin and flagyl  then [place] came to [place] he was still having the L toe ulcer and was not improving so he went to his PCP who prescribed for him cefprozil  he was seen by podiatry that recommended    69 Y.O.M with PMH of DM , HTN, HLD   presented to ED was recently on vacation in [facility] and while he was there he stepped on a pine needle that was 2nd week of August  on the 1 st L toe according to him and it was getting red and swollen , his wife is a physician ( neurologist ) and his family member in [place] as well is physician and he was prescribed antibiotics , per podiatry note Augmentin and flagyl  then [place] came to [place] he was still having the L toe ulcer and was not improving so he went to his PCP who prescribed for him cefprozil  he was seen by podiatry that recommended wound care center and on eval was found to have probing down to bone so was advised to come to ED  patient denied any fever chills , nausea , vomiting , no chills   no discharge from L big toe , no numbness or tingling   no urinary or bowel changes

## 2024-09-12 NOTE — H&P ADULT - ASSESSMENT
A/P    # non healing 1st toe ulcer with clinical suspicion for OM  # cellulitis of 1st L big toe , diabetic foot infection   - s/p xray   - clinically probe to bone per podiatry >> clinical OM  - check MRI of the L forefoot to confirm clinical suspicion for  OM  - trend ESR and CRP  - s/p vanco and Zosyn in ED  - no leukocytosis and no fever   - c/w   # DM type 2  # HLD/ HLD   A/P    # non healing 1st toe ulcer with clinical suspicion for OM  # cellulitis of 1st L big toe , diabetic foot infection   - s/p PO Augmentin , flagyl and Cefprozil   - s/p xray , f/u official reading   - clinically probe to bone per podiatry >> clinical OM  - check MRI with contrast of the L forefoot to confirm clinical suspicion for  OM  - trend ESR and CRP  - s/p vanco and Zosyn in ED  - no leukocytosis and no fever   - c/w vancomycin and cefepime   - ID consulted Dr. Ojeda   - podiatry consulted and evaluated pt and per eval probe to pone clinically OM , recommended MRI as above mentioned  - vascular consult in am , f/u recommendation as may need MARIANELA/PVR study to assess for vasculature   - per podiatry may need surgical intervention   - wound care       # DM type 2  - hold oral DM medications   - on home lantus 15 U at bedtime , will give 8 U and adjust as needed per BG check   - started insulin SS  - f/u BG , target BG during admission 140-180   - f/u A1c    # HLD/ HLD  - home ramipril 5 mg Q 12 hrs interchanged with lisinopril 20 mg Q 12 hrs due to pharmacy availability   - c/w home atorvastatin 20 mg   - f/u lipid panel     patient was prescribed per his PCP ASA but stated that he stopped taking it as he felt taking too many medications     # mild anemia   per chart from PCP seem pt known to have anemia   anemia w/u ( TIBC, iron , ferritin , reticulocyte count , LDH and occult blood )   on [place] cancer screening   per [facility] HOPKINS has been seen by GI this month    # thyroid nodules   [facility] well [facility]  check TSH and T4    DVT ppx: heparin SQ  full code

## 2024-09-12 NOTE — PROGRESS NOTE ADULT - ASSESSMENT
70yo M with PMH of Type 2 DM, HTN, HLD p/w non-healing left hallux wound a/w diabetic foot infection with suspected left hallux OM.     # non healing left hallux ulcer with clinical suspicion for OM  #diabetic foot infection   - s/p PO Augmentin , flagyl and subsequently Cefprozil as outpatient but did not heal  - clinically probe to bone per podiatry >> clinical OM  - MRI left foot w/ and w/out Gadolinium showed: "1.  Plantar great toe skin wound with small subjacent fluid collection and marked surrounding soft tissue enhancement concerning for synovitis with an evolving infected collection.  2.  Abnormal marrow signal within the first distal phalanx and the medial margin of the first proximal phalangeal head. Given the overlying skin wound, findings are concerning for osteomyelitis.  - trend ESR and CRP  - treat with zosyn empirically per ID and adjust abx based on cultures  - no leukocytosis and no fever   - ID consulted, Dr. Ojeda, recs appreciated  - podiatry (Dr. Becerril), recs appreciated   - consulted and evaluated pt and per eval probe to pone clinically OM , recommended MRI as above mentioned  - vascular consulted (Dr. Lerma), recs appreciated -- may proceed with podiatric procedure  - podiatry discussed options with the pt and pt's wife and decision was to pursue bone biopsy and potential long course of IV abx if OM is confirmed -- pt does not wish to pursue any amputation  - pt is an intermediate risk patient for a low-risk procedure and is medically optimized for the procedure  - cardio (Dr. Witt), recs appreciated - gave cardiac optimization pre-op      # DM type 2  - hold oral DM medications   - on home lantus 12-15 U at bedtime , will c/w lantus 8un sq QHS for now especially as pt will be NPO after midnight and FSG this morning was 156 on the lower dose lantus  - started insulin SS -- changed to mod-dose correctional ISS -- will likely add standing pre-meal admelog post-op tomorrow  - f/u BG , target BG during admission 100-180   - Hgb A1c of 9.3%    # HLD  - c/w home atorvastatin 20 mg   - f/u lipid panel   - of note, patient was prescribed per his PCP ASA but he did not wish to take     # HTN  - home ramipril 5 mg Q 12 hrs interchanged with lisinopril 20 mg Q 12 hrs due to pharmacy availability     # mild anemia   per chart from PCP seem pt known to have anemia   anemia w/u ( TIBC, iron , ferritin , reticulocyte count , LDH and occult blood )  counseled on colon cancer screening   per [facility] has been seen by GI this month    # thyroid nodules   follows with endocrinology as well as ENT per notes available on [facility] from June 2024  monitor TFTs - outpatient f/up    DVT ppx: heparin SQ  full code    70yo M with PMH of Type 2 DM, HTN, HLD p/w non-healing left hallux wound a/w diabetic foot infection with suspected left hallux OM.     # non healing left hallux ulcer with clinical suspicion for OM  #diabetic foot infection   - s/p PO Augmentin , flagyl and subsequently Cefprozil as outpatient but did not heal  - clinically probe to bone per podiatry >> clinical OM  - MRI left foot w/ and w/out Gadolinium showed: "1.  Plantar great toe skin wound with small subjacent fluid collection and marked surrounding soft tissue enhancement concerning for synovitis with an evolving infected collection.  2.  Abnormal marrow signal within the first distal phalanx and the medial margin of the first proximal phalangeal head. Given the overlying skin wound, findings are concerning for osteomyelitis.  - trend ESR and CRP  - treat with zosyn empirically per ID and adjust abx based on cultures  - no leukocytosis and no fever   - ID consulted, Dr. Ojeda, recs appreciated  - podiatry (Dr. Becerril), recs appreciated   - consulted and evaluated pt and per eval probe to pone clinically OM , recommended MRI as above mentioned  - vascular consulted (Dr. Lerma), recs appreciated -- may proceed with podiatric procedure  - podiatry discussed options with the pt and pt's wife and decision was to pursue bone biopsy and potential long course of IV abx if OM is confirmed -- pt does not wish to pursue any amputation  - pt is an intermediate risk patient for a low-risk procedure and is medically optimized for the procedure  - cardio (Dr. Witt), recs appreciated - gave cardiac optimization pre-op      # DM type 2  - hold home oral DM medications   - on home lantus 12-15 U at bedtime , will c/w lantus 8un sq QHS for now especially as pt will be NPO after midnight and FSG this morning was 156 on the lower dose lantus  - started insulin SS -- changed to mod-dose correctional ISS -- will likely add standing pre-meal admelog post-op tomorrow  - f/u BG , target BG during admission 100-180   - Hgb A1c of 9.3%  - gave education on DM2 management to pt and pt's wife  - diabetes NP consulted for further education    # HLD  - c/w home atorvastatin 20 mg   - f/u lipid panel   - of note, patient was prescribed per his PCP ASA but he did not wish to take     # HTN  - home ramipril 5 mg Q 12 hrs interchanged with lisinopril 20 mg Q 12 hrs due to pharmacy availability     # mild anemia   per chart from PCP seem pt known to have anemia   anemia w/u ( TIBC, iron , ferritin , reticulocyte count , LDH and occult blood )  counseled on colon cancer screening   per [facility] has been seen by GI this month    # thyroid nodules   follows with endocrinology as well as ENT per notes available on [facility] from June 2024  monitor TFTs - outpatient f/up    DVT ppx: heparin SQ  full code    70yo M with PMH of Type 2 DM, HTN, HLD p/w non-healing left hallux wound a/w diabetic foot infection with suspected left hallux OM.     # non healing left hallux ulcer with clinical suspicion for OM  #diabetic foot infection   - s/p PO Augmentin , flagyl and subsequently Cefprozil as outpatient but did not heal  - clinically probe to bone per podiatry >> clinical OM  - MRI left foot w/ and w/out Gadolinium showed: "1.  Plantar great toe skin wound with small subjacent fluid collection and marked surrounding soft tissue enhancement concerning for synovitis with an evolving infected collection.  2.  Abnormal marrow signal within the first distal phalanx and the medial margin of the first proximal phalangeal head. Given the overlying skin wound, findings are concerning for osteomyelitis.  - trend ESR and CRP  - treat with zosyn empirically per ID and adjust abx based on cultures  - no leukocytosis and no fever   - f/up BCx and operative cultures and pathology  - ID consulted, Dr. Ojeda, recs appreciated  - podiatry (Dr. Becerril), recs appreciated   - consulted and evaluated pt and per eval probe to pone clinically OM , recommended MRI as above mentioned  - vascular consulted (Dr. Lerma), recs appreciated -- may proceed with podiatric procedure  - podiatry discussed options with the pt and pt's wife and decision was to pursue bone biopsy and potential long course of IV abx if OM is confirmed -- pt does not wish to pursue any amputation  - pt is an intermediate risk patient for a low-risk procedure and is medically optimized for the procedure  - cardio (Dr. Witt), recs appreciated - gave cardiac optimization pre-op      # DM type 2  - hold home oral DM medications   - on home lantus 12-15 U at bedtime , will c/w lantus 8un sq QHS for now especially as pt will be NPO after midnight and FSG this morning was 156 on the lower dose lantus  - started insulin SS -- changed to mod-dose correctional ISS -- will likely add standing pre-meal admelog post-op tomorrow  - f/u BG , target BG during admission 100-180   - Hgb A1c of 9.3%  - gave education on DM2 management to pt and pt's wife  - diabetes NP consulted for further education    # HLD  - c/w home atorvastatin 20 mg   - f/u lipid panel   - of note, patient was prescribed per his PCP ASA but he did not wish to take     # HTN  - home ramipril 5 mg Q 12 hrs interchanged with lisinopril 20 mg Q 12 hrs due to pharmacy availability     # mild anemia   per chart from PCP seem pt known to have anemia   anemia w/u ( TIBC, iron , ferritin , reticulocyte count , LDH and occult blood )  counseled on colon cancer screening   [facility]    # thyroid nodules   follows with endocrinology as well as ENT per notes available on [facility] from June 2024  monitor TFTs - outpatient f/up    DVT ppx: heparin SQ  full code

## 2024-09-12 NOTE — H&P ADULT - NSHPPHYSICALEXAM_GEN_ALL_CORE
GEN: NAD , comfortable in bed   HEENT: NC/AT, PERRL, no JVD, no icterus   lungs: equal air entry b/l, no wheezing or Rhonchi  heart: s1/s2 present ,no murmur , rubs or gallops  abd: soft , not tender or distended , BS+  ext: L big toe swollen erythematous with ulcer at the base , no draining discharge , no cyanosis , sensation is intact   RLE no edema or cyanosis   neuro: AOx3, no focal deficit

## 2024-09-12 NOTE — CONSULT NOTE ADULT - SUBJECTIVE AND OBJECTIVE BOX
[facility]  INFECTIOUS DISEASES   [address] [place] [address]  Tel: [phone]     Fax: [phone]  ========================================================  MD John Elias Michelle, MD Shah, Kaushal, MD Sunjit, Jaspal, MD Sehrish Shahid, MD   ========================================================    MRN-[id number]  STANLEY GONSALEZ     CC: Patient is a 69y old  Male who presents with a chief complaint of OM (12 Sep 2024 01:43)    HPI:  69 Y.O.M with PMH of DM , HTN, HLD   presented to ED was recently on vacation in [facility] and while he was there he stepped on a pine needle that was 2nd week of August  on the 1 st L toe according to him and it was getting red and swollen , his wife is a physician ( neurologist ) and his family member in [facility] as well is physician and he was prescribed antibiotics , per podiatry note Augmentin and flagyl  then [place] came to [place] he was still having the L toe ulcer and was not improving so he went to his PCP who prescribed for him cefprozil  he was seen by podiatry that recommended wound care center and on eval was found to have probing down to bone so was advised to come to ED  patient denied any fever chills , nausea , vomiting , no chills   no discharge from L big toe , no numbness or tingling   no urinary or bowel changes    (12 Sep 2024 01:43)      PAST MEDICAL & SURGICAL HISTORY:  HTN  DM2    Social Hx: No current smoking, EtOH or drugs     FAMILY HISTORY:  Noncontributory     Allergies  No Known Allergies    MEDICATIONS  (STANDING):  atorvastatin 20 milliGRAM(s) Oral at bedtime  cefepime   IVPB 2000 milliGRAM(s) IV Intermittent every 12 hours  dextrose 5%. 1000 milliLiter(s) (100 mL/Hr) IV Continuous <Continuous>  dextrose 5%. 1000 milliLiter(s) (50 mL/Hr) IV Continuous <Continuous>  dextrose 50% Injectable 12.5 Gram(s) IV Push once  dextrose 50% Injectable 25 Gram(s) IV Push once  dextrose 50% Injectable 25 Gram(s) IV Push once  glucagon  Injectable 1 milliGRAM(s) IntraMuscular once  heparin   Injectable 5000 Unit(s) SubCutaneous every 8 hours  insulin glargine Injectable (LANTUS) 8 Unit(s) SubCutaneous at bedtime  insulin lispro (ADMELOG) corrective regimen sliding scale   SubCutaneous three times a day before meals  lisinopril 20 milliGRAM(s) Oral every 12 hours  vancomycin  IVPB 1000 milliGRAM(s) IV Intermittent every 12 hours    MEDICATIONS  (PRN):  dextrose Oral Gel 15 Gram(s) Oral once PRN Blood Glucose LESS THAN 70 milliGRAM(s)/deciliter     REVIEW OF SYSTEMS:  CONSTITUTIONAL:  No Fever or chills  HEENT:  No diplopia or blurred vision.  No sore throat or runny nose.  CARDIOVASCULAR:  No chest pain   RESPIRATORY:  No cough, shortness of breath, PND or orthopnea.  GASTROINTESTINAL:  No nausea, vomiting or diarrhea.  GENITOURINARY:  No dysuria, frequency or urgency. No Blood in urine  MUSCULOSKELETAL:  no joint aches, no muscle pain  SKIN:  foot ulcer     Physical Exam:  Vital Signs Last 24 Hrs  T(C): 36.7 (12 Sep 2024 05:46), Max: 36.8 (11 Sep 2024 17:39)  T(F): 98.1 (12 Sep 2024 05:46), Max: 98.2 (11 Sep 2024 17:39)  HR: 52 (12 Sep 2024 07:32) (52 - 67)  BP: 142/83 (12 Sep 2024 07:32) (127/70 - 155/71)  BP(mean): --  RR: 17 (12 Sep 2024 07:32) (17 - 20)  SpO2: 98% (12 Sep 2024 07:32) (96% - 98%)  Parameters below as of 12 Sep 2024 07:32  Patient On (Oxygen Delivery Method): room air  Height (cm): 182.9 (09-11 @ 17:39)  Weight (kg): 77.1 (09-11 @ 17:39)  BMI (kg/m2): 23 (09-11 @ 17:39)  BSA (m2): 1.99 (09-11 @ 17:39)  GEN: NAD  HEENT: normocephalic and atraumatic. EOMI. PERRL.    NECK: Supple.  No lymphadenopathy   LUNGS: Clear to auscultation.  HEART: Regular rate and rhythm   ABDOMEN: Soft, nontender, and nondistended.    EXTREMITIES: Without edema.  NEUROLOGIC: grossly intact.  PSYCHIATRIC: Appropriate affect .  SKIN: left foot with swelling in 1st toe with an open ulcer   in plantar side with purulent discharge     Labs:  09-12    138  |  104  |  11  ----------------------------<  208<H>  4.5   |  32<H>  |  0.76    Ca    9.2      12 Sep 2024 08:46    TPro  7.3  /  Alb  3.4  /  TBili  0.5  /  DBili  x   /  AST  14<L>  /  ALT  22  /  AlkPhos  76  09-12                        12.1   8.52  )-----------( 360      ( 12 Sep 2024 08:46 )             40.0     Urinalysis Basic - ( 12 Sep 2024 08:46 )    Color: x / Appearance: x / SG: x / pH: x  Gluc: 208 mg/dL / Ketone: x  / Bili: x / Urobili: x   Blood: x / Protein: x / Nitrite: x   Leuk Esterase: x / RBC: x / WBC x   Sq Epi: x / Non Sq Epi: x / Bacteria: x    LIVER FUNCTIONS - ( 12 Sep 2024 08:46 )  Alb: 3.4 g/dL / Pro: 7.3 g/dL / ALK PHOS: 76 U/L / ALT: 22 U/L / AST: 14 U/L / GGT: x           Sedimentation Rate, Erythrocyte: 20 mm/hr (09-11-24 @ 20:10)    All imaging and other data have been reviewed.      Assessment and Plan:   70yo man with PMH of HTN and DM2 was admitted with left 1st toe infection. He was recently on vacation in [facility] and while he was there he stepped on a pine needle that was 2nd week of August on the 1st L toe   according to him and it was getting red and swollen , his wife is a physician ( neurologist ) and his family member in [facility] as well is physician and he was prescribed antibiotics, per podiatry note Augmentin and flagyl.  then [place] came to [place] he was still having the L toe ulcer and was not improving so he went to his PCP who prescribed for him cefprozil.   he was seen by podiatry that recommended wound care center and on eval was found to have probing down to bone so was advised to come to ED.    # Left 1st toe infection r/o OM  # R/o fungal infection? Sporotrichosis     - Will follow cultures  - Fungal culture has been sent   - Will monitor WBC and Tmx both normal  - Podiatry for tissue culture and biopsy   - MRI   - Start zosyn 3.375gm qh for now until cultures are back     Thank you for courtesy of this consult.     Will follow.  Discussed with the primary team.     Rhett Ojeda MD  Division of Infectious Diseases   Please call ID service at [phone] with any question.    75 minutes spent on total encounter assessing patient, examination, chart review, counseling and coordinating care by the attending physician/nurse/care manager.

## 2024-09-12 NOTE — CONSULT NOTE ADULT - ASSESSMENT
69 Y.O. M with PMH of DM , HTN, HLD presented to the ED with non-healing wound on the left big toe.  Cardiology was consulted for pre-surgical clearance prior to possible Podiatric surgical intervention.     69 Y.O. M with PMH of DM , HTN, HLD presented to the ED with non-healing wound on the left big toe.  Cardiology was consulted for pre-surgical clearance prior to Podiatric surgical intervention.    #Pre-Surgical Clearance  - Patient is not complaining of any cardiac symptoms at this time.  - No acute changes on EKG compared to previous.  - No meaningful evidence of volume overload.  - Pt has no history of abnormal EKGs.  - Pt has no active ischemia, decompensated heart failure, unstable arrythmia, or severe stenotic valvular disease, and has RCRI score of 1.  - Pt is functionally greater than 4 METs and ambulates without assistive devices.  - In the setting of low risk bone biopsy, he/she is optimized from cardiovascular standpoint to proceed with planned procedure with routine hemodynamic monitoring.   - All other workup per primary team.  - Will continue to follow.    #HTN  - BP well controlled, monitor routine hemodynamics.  - Continue Lisinopril 20 mg    #HLD  - Chronic  - Continue Atorvastatin 20 mg

## 2024-09-12 NOTE — CONSULT NOTE ADULT - ATTENDING COMMENTS
69 Y.O. M with PMH of DM , HTN, HLD presented to the ED with non-healing wound on the left big toe.  Cardiology was consulted for pre-surgical clearance prior to Podiatric surgical intervention.      - In the setting of low risk bone biopsy, he/she is optimized from cardiovascular standpoint to proceed with planned procedure with routine hemodynamic monitoring.   - Continue Lisinopril 20 mg  - Continue Atorvastatin 20 mg

## 2024-09-12 NOTE — CONSULT NOTE ADULT - ASSESSMENT
Type 2 A1c 9.3% adm diabetes foot ulcer  12 units Lantus @ HS  mod ISS ACHS  CC diet and accucheck ACHS  Recommend endocrine-Perlman onconsult  FU appt: TBA  DSC recommendations: return to home regimen and glucose monitoring  diabetes education provided as documented above  Diabetes support info and cell # [phone] given   Goal 100-180 mg/dL; 140-180 mg/dL in critical care areas

## 2024-09-12 NOTE — CARE COORDINATION ASSESSMENT. - LEGAL CONCERNS
Dermatomyositis ([facility] 2008    Eczema     Fibromyalgia     Fibromyalgia     GERD (gastroesophageal reflux disease)     Headache(784.0)     History of blood transfusion 2009    also two in 2014     HTN (hypertension)     Hx of blood clots 2014    PE and DVT    Irritable bowel syndrome     Lymphedema     Morbid obesity with BMI of 50.0-59.9, adult (HCC)     Osteoarthritis     PCOS (polycystic ovarian syndrome)     Prediabetes     Pulmonary embolism ([facility] 07/17/2014    Overview:  Continue anticoagulation. Medicine managing Coumadin bridge. Daily H/H, PTT, PT/INR. Last Assessment & Plan:  HPI: Seen in hospital follow up. The patient is a 27year old y/o female who admitted to the hospital for pulmonary emboli.  She began to experience left lower extremity swelling and pain for two days, and day of admission she began to experience chest pain and shortness of     Sleep apnea     Vaginal high risk HPV DNA test positive 05/2016    Wears glasses        Past Surgical History:        Procedure Laterality Date    COLONOSCOPY N/A 01/13/2021    COLONOSCOPY performed by Luisa Rodríguez MD at [facility]      crossed eyes    HYSTERECTOMY ([address] [place] [address])  2014    full-ovaries gone    MUSCLE BIOPSY      left thigh    OVARIAN CYST REMOVAL  1998    SKIN BIOPSY  2021    birth merritt removal left arm    VENA CAVA FILTER PLACEMENT      inserted in 2014 and removed in 2020       Social History:    Social History     Tobacco Use    Smoking status: Never     Passive exposure: Past    Smokeless tobacco: Never   Substance Use Topics    Alcohol use: Not Currently     Comment: occasionally                                Counseling given: Not Answered      Vital Signs (Current):   Vitals:    04/13/23 1036   Weight: (!) 350 lb (158.8 kg)   Height: 5' 3\" (1.6 m)                                              BP Readings from Last 3 Encounters:   04/27/23 118/74   04/05/23 130/79   03/15/23 106/68 no concerns

## 2024-09-12 NOTE — ED ADULT NURSE REASSESSMENT NOTE - NS ED NURSE REASSESS COMMENT FT1
1013:  called and spoke to MRI.  Eligio (glucose monitoring device) was removed as per MRI.  the patient became frustrated, states he has had MRI's with hit present before, but I explained to him that this is our policy.  all clothing removed, and he was placed into MRI gown.  shelly garcia
1100: patient taken to MRI.  shelly garcia.
1140:  patient still in MRI.  fingerstick to be performed upon his return from MRI.  shelly garcia
Pt moved into room 15A, lights darkened and additional blanket provided. Pt sleeping between care, notified pending bed assignment. Pt denies any needs, complaints, or concerns at this time. Call bell remains within reach.
Pt resting comfortably in stretcher, denies any pain or discomfort at this time. AM medications administered as ordered. Pt denies any needs, complaints, or concerns at this time. Call bell remains within reach.

## 2024-09-12 NOTE — CARE COORDINATION ASSESSMENT. - OTHER PERTINENT DISCHARGE PLANNING INFORMATION:
Met with patient and spouse at bedside to discuss the role of case management with verbalized understanding.  Seen in ED and anticipate inpatient admission for foot ulcer r/o osteo.  Denies any previous DME or [facility] services.  Will monitor for transition needs and remain available.  PCP Dr Jefferson Bruno

## 2024-09-12 NOTE — PROGRESS NOTE ADULT - SUBJECTIVE AND OBJECTIVE BOX
Patient is a 69y old  Male who presents with a chief complaint of non-healing left hallux ulcer.     INTERVAL HPI/OVERNIGHT EVENTS: Pt states he feels "alright." Pt reports mild pain in left hallux at times, well controlled. Denies fever, chills, SOB, CP, abd pain, n/v.    MEDICATIONS  (STANDING):  atorvastatin 20 milliGRAM(s) Oral at bedtime  cefepime   IVPB 2000 milliGRAM(s) IV Intermittent every 12 hours  dextrose 5%. 1000 milliLiter(s) (100 mL/Hr) IV Continuous <Continuous>  dextrose 5%. 1000 milliLiter(s) (50 mL/Hr) IV Continuous <Continuous>  dextrose 50% Injectable 25 Gram(s) IV Push once  dextrose 50% Injectable 25 Gram(s) IV Push once  dextrose 50% Injectable 12.5 Gram(s) IV Push once  glucagon  Injectable 1 milliGRAM(s) IntraMuscular once  heparin   Injectable 5000 Unit(s) SubCutaneous every 8 hours  insulin glargine Injectable (LANTUS) 8 Unit(s) SubCutaneous at bedtime  insulin lispro (ADMELOG) corrective regimen sliding scale   SubCutaneous three times a day before meals  lisinopril 20 milliGRAM(s) Oral every 12 hours  vancomycin  IVPB 1000 milliGRAM(s) IV Intermittent every 12 hours    MEDICATIONS  (PRN):  dextrose Oral Gel 15 Gram(s) Oral once PRN Blood Glucose LESS THAN 70 milliGRAM(s)/deciliter      Allergies    No Known Allergies    Intolerances        REVIEW OF SYSTEMS:  CONSTITUTIONAL: No fever or chills  HEENT:  No headache, no sore throat  RESPIRATORY: No cough, wheezing, or shortness of breath  CARDIOVASCULAR: No chest pain, palpitations  GASTROINTESTINAL: No abd pain, nausea, vomiting, or diarrhea  GENITOURINARY: No dysuria, frequency, or hematuria  NEUROLOGICAL: no focal weakness or dizziness  MUSCULOSKELETAL: no myalgias ; +mild left hallux pain at ulcer occasionally    Vital Signs Last 24 Hrs  T(C): 36.3 (12 Sep 2024 12:20), Max: 36.8 (11 Sep 2024 17:39)  T(F): 97.4 (12 Sep 2024 12:20), Max: 98.2 (11 Sep 2024 17:39)  HR: 68 (12 Sep 2024 12:20) (52 - 68)  BP: 129/79 (12 Sep 2024 12:20) (127/70 - 155/71)  BP(mean): --  RR: 18 (12 Sep 2024 12:20) (17 - 20)  SpO2: 96% (12 Sep 2024 12:20) (96% - 98%)    Parameters below as of 12 Sep 2024 12:20  Patient On (Oxygen Delivery Method): room air        PHYSICAL EXAM:  GENERAL: NAD  HEENT:  anicteric, moist mucous membranes  CHEST/LUNG:  CTA b/l, no rales, wheezes, or rhonchi  HEART:  RRR, S1, S2  ABDOMEN:  BS+, soft, nontender, nondistended  EXTREMITIES: no edema, cyanosis, or calf tenderness  NERVOUS SYSTEM: answers questions and follows commands appropriately    LABS:                        12.1   8.52  )-----------( 360      ( 12 Sep 2024 08:46 )             40.0     CBC Full  -  ( 12 Sep 2024 08:46 )  WBC Count : 8.52 K/uL  Hemoglobin : 12.1 g/dL  Hematocrit : 40.0 %  Platelet Count - Automated : 360 K/uL  Mean Cell Volume : 84.0 fl  Mean Cell Hemoglobin : 25.4 pg  Mean Cell Hemoglobin Concentration : 30.3 gm/dL  Auto Neutrophil # : x  Auto Lymphocyte # : x  Auto Monocyte # : x  Auto Eosinophil # : x  Auto Basophil # : x  Auto Neutrophil % : x  Auto Lymphocyte % : x  Auto Monocyte % : x  Auto Eosinophil % : x  Auto Basophil % : x    12 Sep 2024 08:46    138    |  104    |  11     ----------------------------<  208    4.5     |  32     |  0.76     Ca    9.2        12 Sep 2024 08:46    TPro  7.3    /  Alb  3.4    /  TBili  0.5    /  DBili  x      /  AST  14     /  ALT  22     /  AlkPhos  76     12 Sep 2024 08:46        CAPILLARY BLOOD GLUCOSE      POCT Blood Glucose.: 303 mg/dL (12 Sep 2024 12:07)  POCT Blood Glucose.: 156 mg/dL (12 Sep 2024 07:29)        RADIOLOGY & ADDITIONAL TESTS:    Personally reviewed.     Consultant(s) Notes Reviewed:  [x] YES  [ ] NO     Patient is a 69y old  Male who presents with a chief complaint of non-healing left hallux ulcer.      INTERVAL HPI/OVERNIGHT EVENTS: Pt states he feels "alright." Pt reports mild pain in left hallux at times, well controlled. Denies fever, chills, SOB, CP, abd pain, n/v.    MEDICATIONS  (STANDING):  atorvastatin 20 milliGRAM(s) Oral at bedtime  cefepime   IVPB 2000 milliGRAM(s) IV Intermittent every 12 hours  dextrose 5%. 1000 milliLiter(s) (100 mL/Hr) IV Continuous <Continuous>  dextrose 5%. 1000 milliLiter(s) (50 mL/Hr) IV Continuous <Continuous>  dextrose 50% Injectable 25 Gram(s) IV Push once  dextrose 50% Injectable 25 Gram(s) IV Push once  dextrose 50% Injectable 12.5 Gram(s) IV Push once  glucagon  Injectable 1 milliGRAM(s) IntraMuscular once  heparin   Injectable 5000 Unit(s) SubCutaneous every 8 hours  insulin glargine Injectable (LANTUS) 8 Unit(s) SubCutaneous at bedtime  insulin lispro (ADMELOG) corrective regimen sliding scale   SubCutaneous three times a day before meals  lisinopril 20 milliGRAM(s) Oral every 12 hours  vancomycin  IVPB 1000 milliGRAM(s) IV Intermittent every 12 hours    MEDICATIONS  (PRN):  dextrose Oral Gel 15 Gram(s) Oral once PRN Blood Glucose LESS THAN 70 milliGRAM(s)/deciliter      Allergies    No Known Allergies    Intolerances        REVIEW OF SYSTEMS:  CONSTITUTIONAL: No fever or chills  HEENT:  No headache, no sore throat  RESPIRATORY: No cough, wheezing, or shortness of breath  CARDIOVASCULAR: No chest pain, palpitations  GASTROINTESTINAL: No abd pain, nausea, vomiting, or diarrhea  GENITOURINARY: No dysuria, frequency, or hematuria  NEUROLOGICAL: no focal weakness or dizziness  MUSCULOSKELETAL: no myalgias ; +mild left hallux pain at ulcer occasionally    Vital Signs Last 24 Hrs  T(C): 36.3 (12 Sep 2024 12:20), Max: 36.8 (11 Sep 2024 17:39)  T(F): 97.4 (12 Sep 2024 12:20), Max: 98.2 (11 Sep 2024 17:39)  HR: 68 (12 Sep 2024 12:20) (52 - 68)  BP: 129/79 (12 Sep 2024 12:20) (127/70 - 155/71)  BP(mean): --  RR: 18 (12 Sep 2024 12:20) (17 - 20)  SpO2: 96% (12 Sep 2024 12:20) (96% - 98%)    Parameters below as of 12 Sep 2024 12:20  Patient On (Oxygen Delivery Method): room air        PHYSICAL EXAM:  GENERAL: NAD  HEENT:  anicteric, moist mucous membranes  CHEST/LUNG:  CTA b/l, no rales, wheezes, or rhonchi  HEART:  RRR, S1, S2  ABDOMEN:  BS+, soft, nontender, nondistended  EXTREMITIES: no edema, cyanosis, or calf tenderness  NERVOUS SYSTEM: answers questions and follows commands appropriately    LABS:                        12.1   8.52  )-----------( 360      ( 12 Sep 2024 08:46 )             40.0     CBC Full  -  ( 12 Sep 2024 08:46 )  WBC Count : 8.52 K/uL  Hemoglobin : 12.1 g/dL  Hematocrit : 40.0 %  Platelet Count - Automated : 360 K/uL  Mean Cell Volume : 84.0 fl  Mean Cell Hemoglobin : 25.4 pg  Mean Cell Hemoglobin Concentration : 30.3 gm/dL  Auto Neutrophil # : x  Auto Lymphocyte # : x  Auto Monocyte # : x  Auto Eosinophil # : x  Auto Basophil # : x  Auto Neutrophil % : x  Auto Lymphocyte % : x  Auto Monocyte % : x  Auto Eosinophil % : x  Auto Basophil % : x    12 Sep 2024 08:46    138    |  104    |  11     ----------------------------<  208    4.5     |  32     |  0.76     Ca    9.2        12 Sep 2024 08:46    TPro  7.3    /  Alb  3.4    /  TBili  0.5    /  DBili  x      /  AST  14     /  ALT  22     /  AlkPhos  76     12 Sep 2024 08:46        CAPILLARY BLOOD GLUCOSE      POCT Blood Glucose.: 303 mg/dL (12 Sep 2024 12:07)  POCT Blood Glucose.: 156 mg/dL (12 Sep 2024 07:29)        RADIOLOGY & ADDITIONAL TESTS:    Personally reviewed.     Consultant(s) Notes Reviewed:  [x] YES  [ ] NO     Patient is a 69y old  Male who presents with a chief complaint of non-healing left hallux ulcer.      INTERVAL HPI/OVERNIGHT EVENTS: Pt states he feels "alright." Pt reports mild pain in left hallux at times, well controlled. Denies fever, chills, SOB, CP, abd pain, n/v.    MEDICATIONS  (STANDING):  atorvastatin 20 milliGRAM(s) Oral at bedtime  cefepime   IVPB 2000 milliGRAM(s) IV Intermittent every 12 hours  dextrose 5%. 1000 milliLiter(s) (100 mL/Hr) IV Continuous <Continuous>  dextrose 5%. 1000 milliLiter(s) (50 mL/Hr) IV Continuous <Continuous>  dextrose 50% Injectable 25 Gram(s) IV Push once  dextrose 50% Injectable 25 Gram(s) IV Push once  dextrose 50% Injectable 12.5 Gram(s) IV Push once  glucagon  Injectable 1 milliGRAM(s) IntraMuscular once  heparin   Injectable 5000 Unit(s) SubCutaneous every 8 hours  insulin glargine Injectable (LANTUS) 8 Unit(s) SubCutaneous at bedtime  insulin lispro (ADMELOG) corrective regimen sliding scale   SubCutaneous three times a day before meals  lisinopril 20 milliGRAM(s) Oral every 12 hours  vancomycin  IVPB 1000 milliGRAM(s) IV Intermittent every 12 hours    MEDICATIONS  (PRN):  dextrose Oral Gel 15 Gram(s) Oral once PRN Blood Glucose LESS THAN 70 milliGRAM(s)/deciliter      Allergies    No Known Allergies    Intolerances        REVIEW OF SYSTEMS:  CONSTITUTIONAL: No fever or chills  HEENT:  No headache, no sore throat  RESPIRATORY: No cough, wheezing, or shortness of breath  CARDIOVASCULAR: No chest pain, palpitations  GASTROINTESTINAL: No abd pain, nausea, vomiting, or diarrhea  GENITOURINARY: No dysuria, frequency, or hematuria  NEUROLOGICAL: no focal weakness or dizziness  MUSCULOSKELETAL: no myalgias ; +mild left hallux pain at ulcer occasionally    Vital Signs Last 24 Hrs  T(C): 36.3 (12 Sep 2024 12:20), Max: 36.8 (11 Sep 2024 17:39)  T(F): 97.4 (12 Sep 2024 12:20), Max: 98.2 (11 Sep 2024 17:39)  HR: 68 (12 Sep 2024 12:20) (52 - 68)  BP: 129/79 (12 Sep 2024 12:20) (127/70 - 155/71)  BP(mean): --  RR: 18 (12 Sep 2024 12:20) (17 - 20)  SpO2: 96% (12 Sep 2024 12:20) (96% - 98%)    Parameters below as of 12 Sep 2024 12:20  Patient On (Oxygen Delivery Method): room air        PHYSICAL EXAM:  GENERAL: NAD  HEENT:  anicteric, moist mucous membranes  CHEST/LUNG:  CTA b/l, no rales, wheezes, or rhonchi  HEART:  RRR, S1, S2  ABDOMEN:  BS+, soft, nontender, nondistended  EXTREMITIES: no edema, cyanosis, or calf tenderness; left hallux ulceration - hallux is large in size, with edema, no active drainage at this time  NERVOUS SYSTEM: answers questions and follows commands appropriately    LABS:                        12.1   8.52  )-----------( 360      ( 12 Sep 2024 08:46 )             40.0     CBC Full  -  ( 12 Sep 2024 08:46 )  WBC Count : 8.52 K/uL  Hemoglobin : 12.1 g/dL  Hematocrit : 40.0 %  Platelet Count - Automated : 360 K/uL  Mean Cell Volume : 84.0 fl  Mean Cell Hemoglobin : 25.4 pg  Mean Cell Hemoglobin Concentration : 30.3 gm/dL  Auto Neutrophil # : x  Auto Lymphocyte # : x  Auto Monocyte # : x  Auto Eosinophil # : x  Auto Basophil # : x  Auto Neutrophil % : x  Auto Lymphocyte % : x  Auto Monocyte % : x  Auto Eosinophil % : x  Auto Basophil % : x    12 Sep 2024 08:46    138    |  104    |  11     ----------------------------<  208    4.5     |  32     |  0.76     Ca    9.2        12 Sep 2024 08:46    TPro  7.3    /  Alb  3.4    /  TBili  0.5    /  DBili  x      /  AST  14     /  ALT  22     /  AlkPhos  76     12 Sep 2024 08:46        CAPILLARY BLOOD GLUCOSE      POCT Blood Glucose.: 303 mg/dL (12 Sep 2024 12:07)  POCT Blood Glucose.: 156 mg/dL (12 Sep 2024 07:29)        RADIOLOGY & ADDITIONAL TESTS:    Personally reviewed.     Consultant(s) Notes Reviewed:  [x] YES  [ ] NO

## 2024-09-12 NOTE — PROGRESS NOTE ADULT - SUBJECTIVE AND OBJECTIVE BOX
PROGRESS NOTE   Patient is a 69y old  Male who presents with a chief complaint of OM (12 Sep 2024 10:48)      HPI:  69 Y.O.M with PMH of DM , HTN, HLD   presented to ED was recently on vacation in [facility] and while he was there he stepped on a pine needle that was 2nd week of August  on the 1 st L toe according to him and it was getting red and swollen , his wife is a physician ( neurologist ) and his family member in [place] as well is physician and he was prescribed antibiotics , per podiatry note Augmentin and flagyl  then [place] came to [place]1 he was still having the L toe ulcer and was not improving so he went to his PCP who prescribed for him cefprozil  he was seen by podiatry that recommended wound care center and on eval was found to have probing down to bone so was advised to come to ED  patient denied any fever chills , nausea , vomiting , no chills   no discharge from L big toe , no numbness or tingling   no urinary or bowel changes    (12 Sep 2024 01:43)      Vital Signs Last 24 Hrs  T(C): 36.7 (12 Sep 2024 05:46), Max: 36.8 (11 Sep 2024 17:39)  T(F): 98.1 (12 Sep 2024 05:46), Max: 98.2 (11 Sep 2024 17:39)  HR: 52 (12 Sep 2024 07:32) (52 - 67)  BP: 142/83 (12 Sep 2024 07:32) (127/70 - 155/71)  BP(mean): --  RR: 17 (12 Sep 2024 07:32) (17 - 20)  SpO2: 98% (12 Sep 2024 07:32) (96% - 98%)    Parameters below as of 12 Sep 2024 07:32  Patient On (Oxygen Delivery Method): room air                              12.1   8.52  )-----------( 360      ( 12 Sep 2024 08:46 )             40.0               09-12    138  |  104  |  11  ----------------------------<  208<H>  4.5   |  32<H>  |  0.76    Ca    9.2      12 Sep 2024 08:46    TPro  7.3  /  Alb  3.4  /  TBili  0.5  /  DBili  x   /  AST  14<L>  /  ALT  22  /  AlkPhos  76  09-12      PHYSICAL EXAM  Vascular: DP & PT palpable bilaterally, Capillary refill 3 seconds, Digital hair present bilaterally  Neurological: Light touch sensation diminished to digits   Musculoskeletal: 5/5 strength in all quadrants bilaterally, AJ & STJ ROM intact  Dermatological: 1.0cm x 2.0cm down to bone with edema, erythema, and serous drainage,  no fluctuance, no malodor, no proximal streaking at this time

## 2024-09-12 NOTE — CONSULT NOTE ADULT - SUBJECTIVE AND OBJECTIVE BOX
[facility]         Cecilio Lennon, George, Douglas, Eduar, Kevin Barker        [phone] (office)    Reason for Consult: Pre-Surgical Clearance    HPI:  69 Y.O. M with PMH of DM , HTN, HLD presented to the ED with non-healing wound on the left big toe. History was provided by his wife who is at bedside as the pt speaks Frisian. Pt states he was in [place] and stepped on a pine needle which punctured his foot in mid-July. Since then, the wound has been red, swollen, and not healing.   Currently, pt notes discomfort in his left foot, but otherwise denies CP, SOB, dizziness, headache, palpitations, N/V.  Pt has never seen a Cardiologist in the past, but does get yearly EKGs from his PCP which are normal as per pt. He does not have a history of stroke, TIA, MI, or CHF. He does have diabetes requiring insulin.   Pt is functionally >4 METs. He denies SOB or CP on exertion and ambulates without assistive devices.         PAST MEDICAL & SURGICAL HISTORY:  Hypertension  Diabetes  Hyperlipidemia    SOCIAL HISTORY: No active tobacco, alcohol or illicit drug use    Home Medications:  atorvastatin 20 mg oral tablet: 1 tab(s) orally once a day (at bedtime) (12 Sep 2024 00:47)  insulin glargine 100 units/mL subcutaneous solution: 15 unit(s) subcutaneous once a day (at bedtime) (12 Sep 2024 00:57)  metFORMIN 1000 mg oral tablet: 1 tab(s) orally 2 times a day (12 Sep 2024 00:48)  Onglyza 5 mg oral tablet: 1 tab(s) orally once a day (12 Sep 2024 00:48)  ramipril 5 mg oral tablet: 1 tab(s) orally every 12 hours (12 Sep 2024 00:48)      MEDICATIONS  (STANDING):  atorvastatin 20 milliGRAM(s) Oral at bedtime  cefepime   IVPB 2000 milliGRAM(s) IV Intermittent every 12 hours  dextrose 5%. 1000 milliLiter(s) (100 mL/Hr) IV Continuous <Continuous>  dextrose 5%. 1000 milliLiter(s) (50 mL/Hr) IV Continuous <Continuous>  dextrose 50% Injectable 25 Gram(s) IV Push once  dextrose 50% Injectable 25 Gram(s) IV Push once  dextrose 50% Injectable 12.5 Gram(s) IV Push once  glucagon  Injectable 1 milliGRAM(s) IntraMuscular once  heparin   Injectable 5000 Unit(s) SubCutaneous every 8 hours  insulin glargine Injectable (LANTUS) 8 Unit(s) SubCutaneous at bedtime  insulin lispro (ADMELOG) corrective regimen sliding scale   SubCutaneous three times a day before meals  lisinopril 20 milliGRAM(s) Oral every 12 hours  vancomycin  IVPB 1000 milliGRAM(s) IV Intermittent every 12 hours    MEDICATIONS  (PRN):  dextrose Oral Gel 15 Gram(s) Oral once PRN Blood Glucose LESS THAN 70 milliGRAM(s)/deciliter      Allergies  No Known Allergies    REVIEW OF SYSTEMS: Negative except as per HPI.    VITAL SIGNS:   Vital Signs Last 24 Hrs  T(C): 36.3 (12 Sep 2024 12:20), Max: 36.8 (11 Sep 2024 17:39)  T(F): 97.4 (12 Sep 2024 12:20), Max: 98.2 (11 Sep 2024 17:39)  HR: 68 (12 Sep 2024 12:20) (52 - 68)  BP: 129/79 (12 Sep 2024 12:20) (127/70 - 155/71)  BP(mean): --  RR: 18 (12 Sep 2024 12:20) (17 - 20)  SpO2: 96% (12 Sep 2024 12:20) (96% - 98%)    Parameters below as of 12 Sep 2024 12:20  Patient On (Oxygen Delivery Method): room air    PHYSICAL EXAM:  Constitutional: NAD, well-developed  Pulmonary: Non-labored, breath sounds are clear bilaterally, no wheezing, rales or rhonchi  Cardiovascular: +S1, S2, RRR, no murmur  Gastrointestinal: Soft, nontender, nondistended, normoactive bowel sounds  Extremities: Left foot has bandages in place, No peripheral edema   Neurological: Alert, strength and sensitivity are grossly intact  Psych: Mood & affect appropriate    LABS: All Labs Reviewed:                        12.1   8.52  )-----------( 360      ( 12 Sep 2024 08:46 )             40.0                         12.3   9.03  )-----------( 354      ( 11 Sep 2024 20:10 )             39.9     12 Sep 2024 08:46    138    |  104    |  11     ----------------------------<  208    4.5     |  32     |  0.76   11 Sep 2024 20:10    139    |  105    |  14     ----------------------------<  137    4.2     |  26     |  0.75     Ca    9.2        12 Sep 2024 08:46  Ca    9.1        11 Sep 2024 20:10    TPro  7.3    /  Alb  3.4    /  TBili  0.5    /  DBili  x      /  AST  14     /  ALT  22     /  AlkPhos  76     12 Sep 2024 08:46  TPro  7.4    /  Alb  3.8    /  TBili  0.2    /  DBili  x      /  AST  16     /  ALT  23     /  AlkPhos  73     11 Sep 2024 20:10          Blood Culture:     09-12 @ 08:46  TSH: 3.57      EKG:    RADIOLOGY:    CXR:   [facility]         Cecilio Lennon, George, Douglas, Eduar, Kevin Barker        [phone] (office)    Reason for Consult: Pre-Surgical Clearance    HPI:  69 Y.O. M with PMH of DM , HTN, HLD presented to the ED with non-healing wound on the left big toe. History was provided by his wife who is at bedside as the pt speaks [place]ian. Pt states he was in [place] and stepped on a pine needle which punctured his foot in mid-July. Since then, the wound has been red, swollen, and not healing.   Currently, pt notes discomfort in his left foot, but otherwise denies CP, SOB, dizziness, headache, palpitations, N/V.  Pt has never seen a Cardiologist in the past, but does get yearly EKGs from his PCP which are normal as per pt. He does not have a history of stroke, TIA, MI, or CHF. He does have diabetes requiring insulin.   Pt is functionally >4 METs. He denies SOB or CP on exertion and ambulates without assistive devices.         PAST MEDICAL & SURGICAL HISTORY:  Hypertension  Diabetes  Hyperlipidemia    SOCIAL HISTORY: No active tobacco, alcohol or illicit drug use    Home Medications:  atorvastatin 20 mg oral tablet: 1 tab(s) orally once a day (at bedtime) (12 Sep 2024 00:47)  insulin glargine 100 units/mL subcutaneous solution: 15 unit(s) subcutaneous once a day (at bedtime) (12 Sep 2024 00:57)  metFORMIN 1000 mg oral tablet: 1 tab(s) orally 2 times a day (12 Sep 2024 00:48)  Onglyza 5 mg oral tablet: 1 tab(s) orally once a day (12 Sep 2024 00:48)  ramipril 5 mg oral tablet: 1 tab(s) orally every 12 hours (12 Sep 2024 00:48)      MEDICATIONS  (STANDING):  atorvastatin 20 milliGRAM(s) Oral at bedtime  cefepime   IVPB 2000 milliGRAM(s) IV Intermittent every 12 hours  dextrose 5%. 1000 milliLiter(s) (100 mL/Hr) IV Continuous <Continuous>  dextrose 5%. 1000 milliLiter(s) (50 mL/Hr) IV Continuous <Continuous>  dextrose 50% Injectable 25 Gram(s) IV Push once  dextrose 50% Injectable 25 Gram(s) IV Push once  dextrose 50% Injectable 12.5 Gram(s) IV Push once  glucagon  Injectable 1 milliGRAM(s) IntraMuscular once  heparin   Injectable 5000 Unit(s) SubCutaneous every 8 hours  insulin glargine Injectable (LANTUS) 8 Unit(s) SubCutaneous at bedtime  insulin lispro (ADMELOG) corrective regimen sliding scale   SubCutaneous three times a day before meals  lisinopril 20 milliGRAM(s) Oral every 12 hours  vancomycin  IVPB 1000 milliGRAM(s) IV Intermittent every 12 hours    MEDICATIONS  (PRN):  dextrose Oral Gel 15 Gram(s) Oral once PRN Blood Glucose LESS THAN 70 milliGRAM(s)/deciliter      Allergies  No Known Allergies    REVIEW OF SYSTEMS: Negative except as per HPI.    VITAL SIGNS:   Vital Signs Last 24 Hrs  T(C): 36.3 (12 Sep 2024 12:20), Max: 36.8 (11 Sep 2024 17:39)  T(F): 97.4 (12 Sep 2024 12:20), Max: 98.2 (11 Sep 2024 17:39)  HR: 68 (12 Sep 2024 12:20) (52 - 68)  BP: 129/79 (12 Sep 2024 12:20) (127/70 - 155/71)  BP(mean): --  RR: 18 (12 Sep 2024 12:20) (17 - 20)  SpO2: 96% (12 Sep 2024 12:20) (96% - 98%)    Parameters below as of 12 Sep 2024 12:20  Patient On (Oxygen Delivery Method): room air    PHYSICAL EXAM:  Constitutional: NAD, well-developed  Pulmonary: Non-labored, breath sounds are clear bilaterally, no wheezing, rales or rhonchi  Cardiovascular: +S1, S2, RRR, no murmur  Gastrointestinal: Soft, nontender, nondistended, normoactive bowel sounds  Extremities: Left foot has bandages in place, No peripheral edema   Neurological: Alert, strength and sensitivity are grossly intact  Psych: Mood & affect appropriate    LABS: All Labs Reviewed:                        12.   852  )-----------( 360      ( 12 Sep 2024 08:46 )             40.0                         12.3   9.03  )-----------( 354      ( 11 Sep 2024 20:10 )             39.9     12 Sep 2024 08:46    138    |  104    |  11     ----------------------------<  208    4.5     |  32     |  0.76   11 Sep 2024 20:10    139    |  105    |  14     ----------------------------<  137    4.2     |  26     |  0.75     Ca    9.2        12 Sep 2024 08:46  Ca    9.1        11 Sep 2024 20:10    TPro  7.3    /  Alb  3.4    /  TBili  0.5    /  DBili  x      /  AST  14     /  ALT  22     /  AlkPhos  76     12 Sep 2024 08:46  TPro  7.4    /  Alb  3.8    /  TBili  0.2    /  DBili  x      /  AST  16     /  ALT  23     /  AlkPhos  73     11 Sep 2024 20:10      EK-SEP-2024 07:13:38  Sinus bradycardia  Septal infarct , age undetermined

## 2024-09-12 NOTE — CONSULT NOTE ADULT - CONSULT REASON
69y A1C with Estimated Average Glucose Result: 9.3 % (09-12-24 @ 08:46)   diabetes mellitus uncontrolled type 2
eval of possible PAD; nonhealing left hallus wound
OM
Pre-Surgical Clearance
Left great toe infected diabetic ulcer

## 2024-09-12 NOTE — CARE COORDINATION ASSESSMENT. - NSCAREPROVIDERS_GEN_ALL_CORE_FT
CARE PROVIDERS:  Accepting Physician: Wendy Llanos  Access Services: Eleni Alexander  Administration: Joe Rosario  Administration: Matt Morales  Admitting: Wendy Llanos  Attending: John Snyder  Consultant: Joesph Lerma  Consultant: Rhett Ojeda  Consultant: Madeleine Becerril  Consultant: Joelle Ulloa  Consultant: Rudi Yi  Consultant: Khan, Fahrina  Consultant: Cosmo Coker  ED Attending: Abiodun Feng  ED Nurse: Trina Thomas  Nurse: Clive Broussard  Nurse: Trish Merlos  Ordered: ServiceAccount, SCMMLM  Ordered: Doctor, Unknown  Outpatient Provider: Cody Vazquez  PCA/Nursing Assistant: Alise Daniels  PCA/Nursing Assistant: Abiodun Gill  Primary Team: Abiodun Feng  Primary Team: John Snyder  Primary Team: Crescencio Lopez  Team: [facility], Team  UR// Supp. Assoc.: Dong Lopez

## 2024-09-13 LAB
-  AMOXICILLIN/CLAVULANIC ACID: SIGNIFICANT CHANGE UP
-  AMPICILLIN/SULBACTAM: SIGNIFICANT CHANGE UP
-  AMPICILLIN: SIGNIFICANT CHANGE UP
-  AZTREONAM: SIGNIFICANT CHANGE UP
-  CEFAZOLIN: SIGNIFICANT CHANGE UP
-  CEFEPIME: SIGNIFICANT CHANGE UP
-  CEFOXITIN: SIGNIFICANT CHANGE UP
-  CEFTRIAXONE: SIGNIFICANT CHANGE UP
-  CIPROFLOXACIN: SIGNIFICANT CHANGE UP
-  CLINDAMYCIN: SIGNIFICANT CHANGE UP
-  ERTAPENEM: SIGNIFICANT CHANGE UP
-  ERYTHROMYCIN: SIGNIFICANT CHANGE UP
-  GENTAMICIN: SIGNIFICANT CHANGE UP
-  GENTAMICIN: SIGNIFICANT CHANGE UP
-  IMIPENEM: SIGNIFICANT CHANGE UP
-  LEVOFLOXACIN: SIGNIFICANT CHANGE UP
-  MEROPENEM: SIGNIFICANT CHANGE UP
-  OXACILLIN: SIGNIFICANT CHANGE UP
-  PENICILLIN: SIGNIFICANT CHANGE UP
-  PIPERACILLIN/TAZOBACTAM: SIGNIFICANT CHANGE UP
-  RIFAMPIN: SIGNIFICANT CHANGE UP
-  TETRACYCLINE: SIGNIFICANT CHANGE UP
-  TOBRAMYCIN: SIGNIFICANT CHANGE UP
-  TRIMETHOPRIM/SULFAMETHOXAZOLE: SIGNIFICANT CHANGE UP
-  TRIMETHOPRIM/SULFAMETHOXAZOLE: SIGNIFICANT CHANGE UP
-  VANCOMYCIN: SIGNIFICANT CHANGE UP
ANION GAP SERPL CALC-SCNC: 6 MMOL/L — SIGNIFICANT CHANGE UP (ref 5–17)
BUN SERPL-MCNC: 15 MG/DL — SIGNIFICANT CHANGE UP (ref 7–23)
CALCIUM SERPL-MCNC: 9.1 MG/DL — SIGNIFICANT CHANGE UP (ref 8.5–10.1)
CHLORIDE SERPL-SCNC: 103 MMOL/L — SIGNIFICANT CHANGE UP (ref 96–108)
CO2 SERPL-SCNC: 31 MMOL/L — SIGNIFICANT CHANGE UP (ref 22–31)
CREAT SERPL-MCNC: 0.88 MG/DL — SIGNIFICANT CHANGE UP (ref 0.5–1.3)
EGFR: 93 ML/MIN/1.73M2 — SIGNIFICANT CHANGE UP
GLUCOSE SERPL-MCNC: 201 MG/DL — HIGH (ref 70–99)
HCT VFR BLD CALC: 38.8 % — LOW (ref 39–50)
HGB BLD-MCNC: 11.8 G/DL — LOW (ref 13–17)
INR BLD: 1.18 RATIO — SIGNIFICANT CHANGE UP (ref 0.85–1.18)
MAGNESIUM SERPL-MCNC: 1.9 MG/DL — SIGNIFICANT CHANGE UP (ref 1.6–2.6)
MCHC RBC-ENTMCNC: 25.4 PG — LOW (ref 27–34)
MCHC RBC-ENTMCNC: 30.4 GM/DL — LOW (ref 32–36)
MCV RBC AUTO: 83.4 FL — SIGNIFICANT CHANGE UP (ref 80–100)
METHOD TYPE: SIGNIFICANT CHANGE UP
METHOD TYPE: SIGNIFICANT CHANGE UP
NRBC # BLD: 0 /100 WBCS — SIGNIFICANT CHANGE UP (ref 0–0)
PHOSPHATE SERPL-MCNC: 3.4 MG/DL — SIGNIFICANT CHANGE UP (ref 2.5–4.5)
PLATELET # BLD AUTO: 364 K/UL — SIGNIFICANT CHANGE UP (ref 150–400)
POTASSIUM SERPL-MCNC: 4.6 MMOL/L — SIGNIFICANT CHANGE UP (ref 3.5–5.3)
POTASSIUM SERPL-SCNC: 4.6 MMOL/L — SIGNIFICANT CHANGE UP (ref 3.5–5.3)
PROTHROM AB SERPL-ACNC: 13.4 SEC — HIGH (ref 9.5–13)
RBC # BLD: 4.65 M/UL — SIGNIFICANT CHANGE UP (ref 4.2–5.8)
RBC # FLD: 14.8 % — HIGH (ref 10.3–14.5)
SODIUM SERPL-SCNC: 140 MMOL/L — SIGNIFICANT CHANGE UP (ref 135–145)
WBC # BLD: 7.17 K/UL — SIGNIFICANT CHANGE UP (ref 3.8–10.5)
WBC # FLD AUTO: 7.17 K/UL — SIGNIFICANT CHANGE UP (ref 3.8–10.5)

## 2024-09-13 PROCEDURE — 88311 DECALCIFY TISSUE: CPT | Mod: 26

## 2024-09-13 PROCEDURE — 73630 X-RAY EXAM OF FOOT: CPT | Mod: 26,LT

## 2024-09-13 PROCEDURE — 99232 SBSQ HOSP IP/OBS MODERATE 35: CPT

## 2024-09-13 PROCEDURE — 11044 DBRDMT BONE 1ST 20 SQ CM/<: CPT | Mod: TA

## 2024-09-13 PROCEDURE — 88304 TISSUE EXAM BY PATHOLOGIST: CPT | Mod: 26

## 2024-09-13 RX ORDER — PIPERACILLIN SODIUM AND TAZOBACTAM SODIUM 3; .375 G/15ML; G/15ML
3.38 INJECTION, POWDER, FOR SOLUTION INTRAVENOUS EVERY 8 HOURS
Refills: 0 | Status: DISCONTINUED | OUTPATIENT
Start: 2024-09-13 | End: 2024-09-16

## 2024-09-13 RX ORDER — FLU VACCINE TS 2012-2013(5YR+) 45MCG/.5ML
0.5 VIAL (ML) INTRAMUSCULAR ONCE
Refills: 0 | Status: DISCONTINUED | OUTPATIENT
Start: 2024-09-13 | End: 2024-09-17

## 2024-09-13 RX ORDER — RDII 250 MG CAPSULE
250
Refills: 0 | Status: DISCONTINUED | OUTPATIENT
Start: 2024-09-13 | End: 2024-09-17

## 2024-09-13 RX ORDER — LISINOPRIL 10 MG/1
20 TABLET ORAL EVERY 12 HOURS
Refills: 0 | Status: DISCONTINUED | OUTPATIENT
Start: 2024-09-13 | End: 2024-09-17

## 2024-09-13 RX ORDER — SODIUM CHLORIDE 9 MG/ML
1000 INJECTION INTRAMUSCULAR; INTRAVENOUS; SUBCUTANEOUS
Refills: 0 | Status: DISCONTINUED | OUTPATIENT
Start: 2024-09-13 | End: 2024-09-13

## 2024-09-13 RX ORDER — DEXTROSE 15 G/33 G
25 GEL IN PACKET (GRAM) ORAL ONCE
Refills: 0 | Status: DISCONTINUED | OUTPATIENT
Start: 2024-09-13 | End: 2024-09-17

## 2024-09-13 RX ORDER — DEXTROSE 15 G/33 G
15 GEL IN PACKET (GRAM) ORAL ONCE
Refills: 0 | Status: DISCONTINUED | OUTPATIENT
Start: 2024-09-13 | End: 2024-09-17

## 2024-09-13 RX ORDER — OXYCODONE HYDROCHLORIDE 5 MG/1
5 TABLET ORAL ONCE
Refills: 0 | Status: DISCONTINUED | OUTPATIENT
Start: 2024-09-13 | End: 2024-09-13

## 2024-09-13 RX ORDER — DEXTROSE 15 G/33 G
12.5 GEL IN PACKET (GRAM) ORAL ONCE
Refills: 0 | Status: DISCONTINUED | OUTPATIENT
Start: 2024-09-13 | End: 2024-09-17

## 2024-09-13 RX ORDER — GLUCAGON INJECTION, SOLUTION 1 MG/.2ML
1 INJECTION, SOLUTION SUBCUTANEOUS ONCE
Refills: 0 | Status: DISCONTINUED | OUTPATIENT
Start: 2024-09-13 | End: 2024-09-17

## 2024-09-13 RX ORDER — HYDROMORPHONE HYDROCHLORIDE 2 MG/1
0.5 TABLET ORAL
Refills: 0 | Status: DISCONTINUED | OUTPATIENT
Start: 2024-09-13 | End: 2024-09-13

## 2024-09-13 RX ORDER — ONDANSETRON 2 MG/ML
4 INJECTION, SOLUTION INTRAMUSCULAR; INTRAVENOUS ONCE
Refills: 0 | Status: DISCONTINUED | OUTPATIENT
Start: 2024-09-13 | End: 2024-09-13

## 2024-09-13 RX ADMIN — PIPERACILLIN SODIUM AND TAZOBACTAM SODIUM 25 GRAM(S): 3; .375 INJECTION, POWDER, FOR SOLUTION INTRAVENOUS at 07:52

## 2024-09-13 RX ADMIN — Medication 20 MILLIGRAM(S): at 22:52

## 2024-09-13 RX ADMIN — RDII 250 MG CAPSULE 250 MILLIGRAM(S): at 05:25

## 2024-09-13 RX ADMIN — Medication 5000 UNIT(S): at 05:26

## 2024-09-13 RX ADMIN — PIPERACILLIN SODIUM AND TAZOBACTAM SODIUM 25 GRAM(S): 3; .375 INJECTION, POWDER, FOR SOLUTION INTRAVENOUS at 03:08

## 2024-09-13 RX ADMIN — SODIUM CHLORIDE 70 MILLILITER(S): 9 INJECTION INTRAMUSCULAR; INTRAVENOUS; SUBCUTANEOUS at 11:39

## 2024-09-13 RX ADMIN — PIPERACILLIN SODIUM AND TAZOBACTAM SODIUM 25 GRAM(S): 3; .375 INJECTION, POWDER, FOR SOLUTION INTRAVENOUS at 13:19

## 2024-09-13 RX ADMIN — LISINOPRIL 20 MILLIGRAM(S): 10 TABLET ORAL at 17:15

## 2024-09-13 RX ADMIN — LISINOPRIL 20 MILLIGRAM(S): 10 TABLET ORAL at 05:26

## 2024-09-13 RX ADMIN — RDII 250 MG CAPSULE 250 MILLIGRAM(S): at 17:15

## 2024-09-13 RX ADMIN — PIPERACILLIN SODIUM AND TAZOBACTAM SODIUM 25 GRAM(S): 3; .375 INJECTION, POWDER, FOR SOLUTION INTRAVENOUS at 22:51

## 2024-09-13 RX ADMIN — Medication 100 MILLILITER(S): at 20:20

## 2024-09-13 RX ADMIN — Medication 5000 UNIT(S): at 13:19

## 2024-09-13 NOTE — DIETITIAN INITIAL EVALUATION ADULT - ADD RECOMMEND
1) Continue current diet as tolerated; honor food preferences as able to optimize po intake/tolerance  2) Recommend MVI daily and Vit C 500mg BID  3) Insulin adjustments prn  4) Monitor po intake, diet tolerance, weight trends, labs, GI function, skin integrity

## 2024-09-13 NOTE — DIETITIAN INITIAL EVALUATION ADULT - ORAL INTAKE PTA/DIET HISTORY
Pt reports good appetite/intake PTA. Typically consumes 3 meals/day. Wife does meal preparation and grocery shopping. Pt states that he follows a NCS diet at home. Also limits red meat.

## 2024-09-13 NOTE — BRIEF OPERATIVE NOTE - NSICDXBRIEFPOSTOP_GEN_ALL_CORE_FT
POST-OP DIAGNOSIS:  Osteomyelitis of left foot 13-Sep-2024 20:40:17  Madeleine Becerril  Diabetic ulcer of toe of left foot 13-Sep-2024 20:40:29  Madeleine Becerril

## 2024-09-13 NOTE — PROGRESS NOTE ADULT - ASSESSMENT
69 Y.O. M with PMH of DM , HTN, HLD presented to the ED with non-healing wound on the left big toe c/w OM.  Cardiology was consulted for pre-surgical clearance prior to Podiatric surgical intervention.    Cardiac Opitmization  - Patient is not complaining of any cardiac symptoms at this time.  - No acute changes on EKG compared to previous.  - No meaningful evidence of volume overload.    - Pt has no active ischemia, decompensated heart failure, unstable arrythmia, or severe stenotic valvular disease, and has RCRI score of 1.  - In the setting of low risk bone biopsy, he is optimized from cardiovascular standpoint to proceed with planned procedure with routine hemodynamic monitoring.     HTN  - BP well controlled with one isolated elevated, can be reactive to pain  - Continue Lisinopril 20 mg    HLD  - Continue Atorvastatin 20 mg    -Monitor electrolytes, replete to keep K>4 and Mag>2  -Will continue to follow    Zee Quintero DNP, NP-C, AGACNP-C  Cardiology   Call TEAMS            69 Y.O. M with PMH of DM , HTN, HLD presented to the ED with non-healing wound on the left big toe c/w OM.  Cardiology was consulted for pre-surgical clearance prior to Podiatric surgical intervention.    - No acute changes on EKG compared to previous.  - No meaningful evidence of volume overload.    - Pt has no active ischemia, decompensated heart failure, unstable arrythmia, or severe stenotic valvular disease, and has RCRI score of 1.  - In the setting of low risk bone biopsy, he is optimized from cardiovascular standpoint to proceed with planned procedure with routine hemodynamic monitoring.     - BP well controlled with one isolated elevated, can be reactive to pain  - Continue Lisinopril 20 mg  - Continue Atorvastatin 20 mg    -Monitor electrolytes, replete to keep K>4 and Mag>2  -Will continue to follow    Zee Quintero DNP, NP-C, AGACNP-C  Cardiology   Call TEAMS

## 2024-09-13 NOTE — PRE-OP CHECKLIST - SPO2 (%)
Quality 47: Advance Care Plan: Advance Care Planning discussed and documented; advance care plan or surrogate decision maker documented in the medical record. Quality 226: Preventive Care And Screening: Tobacco Use: Screening And Cessation Intervention: Patient screened for tobacco use and is an ex/non-smoker Detail Level: Generalized 95

## 2024-09-13 NOTE — PROGRESS NOTE ADULT - SUBJECTIVE AND OBJECTIVE BOX
Patient is a 69y old  Male who presents with a chief complaint of non-healing left hallux ulcer.      INTERVAL HPI/OVERNIGHT EVENTS: Pt states he feels "fine." Pt reports mild pain in left hallux occasionally that is well controlled. Denies fever, chills, SOB, CP, abd pain, n/v.    MEDICATIONS  (STANDING):  atorvastatin 20 milliGRAM(s) Oral at bedtime  dextrose 5%. 1000 milliLiter(s) (50 mL/Hr) IV Continuous <Continuous>  dextrose 50% Injectable 25 Gram(s) IV Push once  dextrose 50% Injectable 12.5 Gram(s) IV Push once  dextrose 50% Injectable 25 Gram(s) IV Push once  glucagon  Injectable 1 milliGRAM(s) IntraMuscular once  heparin   Injectable 5000 Unit(s) SubCutaneous every 8 hours  influenza  Vaccine (HIGH DOSE) 0.5 milliLiter(s) IntraMuscular once  insulin glargine Injectable (LANTUS) 8 Unit(s) SubCutaneous at bedtime  insulin lispro (ADMELOG) corrective regimen sliding scale   SubCutaneous three times a day before meals  lisinopril 20 milliGRAM(s) Oral every 12 hours  piperacillin/tazobactam IVPB.. 3.375 Gram(s) IV Intermittent every 8 hours  saccharomyces boulardii 250 milliGRAM(s) Oral two times a day    MEDICATIONS  (PRN):  dextrose Oral Gel 15 Gram(s) Oral once PRN Blood Glucose LESS THAN 70 milliGRAM(s)/deciliter  HYDROmorphone  Injectable 0.5 milliGRAM(s) IV Push every 10 minutes PRN Moderate Pain (4 - 6)  ondansetron Injectable 4 milliGRAM(s) IV Push once PRN Nausea and/or Vomiting  oxyCODONE    IR 5 milliGRAM(s) Oral once PRN Mild Pain (1 - 3)        Allergies    No Known Allergies    Intolerances        REVIEW OF SYSTEMS:  CONSTITUTIONAL: No fever or chills  HEENT:  No headache, no sore throat  RESPIRATORY: No cough, wheezing, or shortness of breath  CARDIOVASCULAR: No chest pain, palpitations  GASTROINTESTINAL: No abd pain, nausea, vomiting, or diarrhea  GENITOURINARY: No dysuria, frequency, or hematuria  NEUROLOGICAL: no focal weakness or dizziness  MUSCULOSKELETAL: no myalgias ; +mild left hallux pain at ulcer occasionally    Vital Signs Last 24 Hrs  T(C): 36.7 (13 Sep 2024 13:58), Max: 36.7 (13 Sep 2024 13:58)  T(F): 98 (13 Sep 2024 13:58), Max: 98 (13 Sep 2024 13:58)  HR: 62 (13 Sep 2024 13:58) (55 - 68)  BP: 130/55 (13 Sep 2024 13:58) (108/64 - 148/75)  BP(mean): --  RR: 20 (13 Sep 2024 13:58) (19 - 20)  SpO2: 96% (13 Sep 2024 13:58) (92% - 96%)        PHYSICAL EXAM:  GENERAL: NAD  HEENT:  anicteric, moist mucous membranes  CHEST/LUNG:  CTA b/l, no rales, wheezes, or rhonchi  HEART:  RRR, S1, S2  ABDOMEN:  BS+, soft, nontender, nondistended  EXTREMITIES: no edema, cyanosis, or calf tenderness; left hallux ulceration - hallux is large in size, with edema, no active drainage at this time  NERVOUS SYSTEM: answers questions and follows commands appropriately    LABS:                                   11.8   7.17  )-----------( 364      ( 13 Sep 2024 09:35 )             38.8     CBC Full  -  ( 13 Sep 2024 09:35 )  WBC Count : 7.17 K/uL  Hemoglobin : 11.8 g/dL  Hematocrit : 38.8 %  Platelet Count - Automated : 364 K/uL  Mean Cell Volume : 83.4 fl  Mean Cell Hemoglobin : 25.4 pg  Mean Cell Hemoglobin Concentration : 30.4 gm/dL  Auto Neutrophil # : x  Auto Lymphocyte # : x  Auto Monocyte # : x  Auto Eosinophil # : x  Auto Basophil # : x  Auto Neutrophil % : x  Auto Lymphocyte % : x  Auto Monocyte % : x  Auto Eosinophil % : x  Auto Basophil % : x    09-13    140  |  103  |  15  ----------------------------<  201<H>  4.6   |  31  |  0.88    Ca    9.1      13 Sep 2024 09:35  Phos  3.4     09-13  Mg     1.9     09-13    TPro  7.3  /  Alb  3.4  /  TBili  0.5  /  DBili  x   /  AST  14<L>  /  ALT  22  /  AlkPhos  76  09-12        Culture - Fungal, Other (collected 12 Sep 2024 09:20)  Source: .Other Other, L foot  Preliminary Report (13 Sep 2024 07:54):    Testing in progress    Culture - Blood (collected 11 Sep 2024 20:13)  Source: .Blood Blood-Peripheral  Preliminary Report (13 Sep 2024 02:03):    No growth at 24 hours          CAPILLARY BLOOD GLUCOSE      POCT Blood Glucose.: 184 mg/dL (13 Sep 2024 16:55)  POCT Blood Glucose.: 205 mg/dL (13 Sep 2024 13:33)  POCT Blood Glucose.: 189 mg/dL (13 Sep 2024 07:36)          RADIOLOGY & ADDITIONAL TESTS:    Personally reviewed.     Consultant(s) Notes Reviewed:  [x] YES  [ ] NO     Patient is a 69y old  Male who presents with a chief complaint of non-healing left hallux ulcer.        INTERVAL HPI/OVERNIGHT EVENTS: Pt states he feels "fine." Pt reports mild pain in left hallux occasionally that is well controlled. Denies fever, chills, SOB, CP, abd pain, n/v.    MEDICATIONS  (STANDING):  atorvastatin 20 milliGRAM(s) Oral at bedtime  dextrose 5%. 1000 milliLiter(s) (50 mL/Hr) IV Continuous <Continuous>  dextrose 50% Injectable 25 Gram(s) IV Push once  dextrose 50% Injectable 12.5 Gram(s) IV Push once  dextrose 50% Injectable 25 Gram(s) IV Push once  glucagon  Injectable 1 milliGRAM(s) IntraMuscular once  heparin   Injectable 5000 Unit(s) SubCutaneous every 8 hours  influenza  Vaccine (HIGH DOSE) 0.5 milliLiter(s) IntraMuscular once  insulin glargine Injectable (LANTUS) 8 Unit(s) SubCutaneous at bedtime  insulin lispro (ADMELOG) corrective regimen sliding scale   SubCutaneous three times a day before meals  lisinopril 20 milliGRAM(s) Oral every 12 hours  piperacillin/tazobactam IVPB.. 3.375 Gram(s) IV Intermittent every 8 hours  saccharomyces boulardii 250 milliGRAM(s) Oral two times a day    MEDICATIONS  (PRN):  dextrose Oral Gel 15 Gram(s) Oral once PRN Blood Glucose LESS THAN 70 milliGRAM(s)/deciliter  HYDROmorphone  Injectable 0.5 milliGRAM(s) IV Push every 10 minutes PRN Moderate Pain (4 - 6)  ondansetron Injectable 4 milliGRAM(s) IV Push once PRN Nausea and/or Vomiting  oxyCODONE    IR 5 milliGRAM(s) Oral once PRN Mild Pain (1 - 3)        Allergies    No Known Allergies    Intolerances        REVIEW OF SYSTEMS:  CONSTITUTIONAL: No fever or chills  HEENT:  No headache, no sore throat  RESPIRATORY: No cough, wheezing, or shortness of breath  CARDIOVASCULAR: No chest pain, palpitations  GASTROINTESTINAL: No abd pain, nausea, vomiting, or diarrhea  GENITOURINARY: No dysuria, frequency, or hematuria  NEUROLOGICAL: no focal weakness or dizziness  MUSCULOSKELETAL: no myalgias ; +mild left hallux pain at ulcer occasionally    Vital Signs Last 24 Hrs  T(C): 36.7 (13 Sep 2024 13:58), Max: 36.7 (13 Sep 2024 13:58)  T(F): 98 (13 Sep 2024 13:58), Max: 98 (13 Sep 2024 13:58)  HR: 62 (13 Sep 2024 13:58) (55 - 68)  BP: 130/55 (13 Sep 2024 13:58) (108/64 - 148/75)  BP(mean): --  RR: 20 (13 Sep 2024 13:58) (19 - 20)  SpO2: 96% (13 Sep 2024 13:58) (92% - 96%)        PHYSICAL EXAM:  GENERAL: NAD  HEENT:  anicteric, moist mucous membranes  CHEST/LUNG:  CTA b/l, no rales, wheezes, or rhonchi  HEART:  RRR, S1, S2  ABDOMEN:  BS+, soft, nontender, nondistended  EXTREMITIES: no edema, cyanosis, or calf tenderness; left hallux ulceration - hallux is large in size, with edema, no active drainage at this time  NERVOUS SYSTEM: answers questions and follows commands appropriately    LABS:                                   11.8   7.17  )-----------( 364      ( 13 Sep 2024 09:35 )             38.8     CBC Full  -  ( 13 Sep 2024 09:35 )  WBC Count : 7.17 K/uL  Hemoglobin : 11.8 g/dL  Hematocrit : 38.8 %  Platelet Count - Automated : 364 K/uL  Mean Cell Volume : 83.4 fl  Mean Cell Hemoglobin : 25.4 pg  Mean Cell Hemoglobin Concentration : 30.4 gm/dL  Auto Neutrophil # : x  Auto Lymphocyte # : x  Auto Monocyte # : x  Auto Eosinophil # : x  Auto Basophil # : x  Auto Neutrophil % : x  Auto Lymphocyte % : x  Auto Monocyte % : x  Auto Eosinophil % : x  Auto Basophil % : x    09-13    140  |  103  |  15  ----------------------------<  201<H>  4.6   |  31  |  0.88    Ca    9.1      13 Sep 2024 09:35  Phos  3.4     09-13  Mg     1.9     09-13    TPro  7.3  /  Alb  3.4  /  TBili  0.5  /  DBili  x   /  AST  14<L>  /  ALT  22  /  AlkPhos  76  09-12        Culture - Fungal, Other (collected 12 Sep 2024 09:20)  Source: .Other Other, L foot  Preliminary Report (13 Sep 2024 07:54):    Testing in progress    Culture - Blood (collected 11 Sep 2024 20:13)  Source: .Blood Blood-Peripheral  Preliminary Report (13 Sep 2024 02:03):    No growth at 24 hours          CAPILLARY BLOOD GLUCOSE      POCT Blood Glucose.: 184 mg/dL (13 Sep 2024 16:55)  POCT Blood Glucose.: 205 mg/dL (13 Sep 2024 13:33)  POCT Blood Glucose.: 189 mg/dL (13 Sep 2024 07:36)          RADIOLOGY & ADDITIONAL TESTS:    Personally reviewed.     Consultant(s) Notes Reviewed:  [x] YES  [ ] NO

## 2024-09-13 NOTE — CASE MANAGEMENT PROGRESS NOTE - NSCMPROGRESSNOTE_GEN_ALL_CORE
Discussed pt on rounds, pt remains acute, on IV Zosyn, NPO for bx and debridement in OR. CM will continue to collaborate with interdisciplinary team and remain available to assist.

## 2024-09-13 NOTE — DIETITIAN INITIAL EVALUATION ADULT - SIGNS/SYMPTOMS
as evidenced by hx of DM, elevated HgbA1c, hyperglycemia.  as evidenced by L 1st toe non healing DM ulcer, plan for biopsy and debridement today.

## 2024-09-13 NOTE — DIETITIAN INITIAL EVALUATION ADULT - OTHER INFO
Pt is a "70 yo M with PMH of Type 2 DM, HTN, HLD p/w non-healing left hallux wound a/w diabetic foot infection with suspected left hallux OM."    Visited pt at bedside this am. Pt currently NPO for procedure today. Reports good appetite/intake. Good intake of dinner meal last night. No food allergies. Denies chewing/swallowing difficulties. Denies N/V. No BMs thus far. CBW on admission 170#. Reports UBW of ~170-180#. No edema noted. Skin: L 1st toe ulcer. Pt currently on consistent carbohydrate diet. Hx of DM, A1c of 9.3% obtained. Pt on metformin and insulin at home. Uses CGM. Previously on GLP-1; stopped taking several months ago. Seen by Diabetes NP yesteday. Provided verbal and written DM diet education during visit. Pt receptive. RD remains available and will continue to follow-up.

## 2024-09-13 NOTE — PROGRESS NOTE ADULT - SUBJECTIVE AND OBJECTIVE BOX
[facility]  INFECTIOUS DISEASES   [address] [place] [address]  Tel: [phone]     Fax: [phone]  ========================================================  MD John Elias Michelle, MD Shah, Kaushal, MD Sunjit, Jaspal, MD Sehrish Shahid, MD   ========================================================    [id number]  STANLEY GONSALEZ     Follow up: Feels better, less pain and pressure. No fever.     PAST MEDICAL & SURGICAL HISTORY:  HTN  DM2    Social Hx: No current smoking, EtOH or drugs     FAMILY HISTORY:  Noncontributory     Allergies  No Known Allergies    MEDICATIONS  (STANDING):  atorvastatin 20 milliGRAM(s) Oral at bedtime  cefepime   IVPB 2000 milliGRAM(s) IV Intermittent every 12 hours  dextrose 5%. 1000 milliLiter(s) (100 mL/Hr) IV Continuous <Continuous>  dextrose 5%. 1000 milliLiter(s) (50 mL/Hr) IV Continuous <Continuous>  dextrose 50% Injectable 12.5 Gram(s) IV Push once  dextrose 50% Injectable 25 Gram(s) IV Push once  dextrose 50% Injectable 25 Gram(s) IV Push once  glucagon  Injectable 1 milliGRAM(s) IntraMuscular once  heparin   Injectable 5000 Unit(s) SubCutaneous every 8 hours  insulin glargine Injectable (LANTUS) 8 Unit(s) SubCutaneous at bedtime  insulin lispro (ADMELOG) corrective regimen sliding scale   SubCutaneous three times a day before meals  lisinopril 20 milliGRAM(s) Oral every 12 hours  vancomycin  IVPB 1000 milliGRAM(s) IV Intermittent every 12 hours    MEDICATIONS  (PRN):  dextrose Oral Gel 15 Gram(s) Oral once PRN Blood Glucose LESS THAN 70 milliGRAM(s)/deciliter     REVIEW OF SYSTEMS:  CONSTITUTIONAL:  No Fever or chills  HEENT:  No diplopia or blurred vision.  No sore throat or runny nose.  CARDIOVASCULAR:  No chest pain   RESPIRATORY:  No cough, shortness of breath, PND or orthopnea.  GASTROINTESTINAL:  No nausea, vomiting or diarrhea.  GENITOURINARY:  No dysuria, frequency or urgency. No Blood in urine  MUSCULOSKELETAL:  no joint aches, no muscle pain  SKIN:  foot ulcer     Physical Exam:  Vital Signs Last 24 Hrs  T(C): 36.7 (13 Sep 2024 13:58), Max: 36.7 (13 Sep 2024 13:58)  T(F): 98 (13 Sep 2024 13:58), Max: 98 (13 Sep 2024 13:58)  HR: 62 (13 Sep 2024 13:58) (55 - 68)  BP: 130/55 (13 Sep 2024 13:58) (108/64 - 148/75)  BP(mean): --  RR: 20 (13 Sep 2024 13:58) (19 - 20)  SpO2: 96% (13 Sep 2024 13:58) (92% - 96%)  GEN: NAD  HEENT: normocephalic and atraumatic. EOMI. PERRL.    NECK: Supple.  No lymphadenopathy   LUNGS: Clear to auscultation.  HEART: Regular rate and rhythm   ABDOMEN: Soft, nontender, and nondistended.    EXTREMITIES: Without edema.  NEUROLOGIC: grossly intact.  PSYCHIATRIC: Appropriate affect .  SKIN: left foot with swelling in 1st toe with an open ulcer   in plantar side with purulent discharge       Labs:                        11.8   7.17  )-----------( 364      ( 13 Sep 2024 09:35 )             38.8     09-13    140  |  103  |  15  ----------------------------<  201<H>  4.6   |  31  |  0.88    Ca    9.1      13 Sep 2024 09:35  Phos  3.4     09-13  Mg     1.9     09-13    TPro  7.3  /  Alb  3.4  /  TBili  0.5  /  DBili  x   /  AST  14<L>  /  ALT  22  /  AlkPhos  76  09-12    Culture - Fungal, Other (collected 09-12-24 @ 09:20)  Source: .Other Other, L foot  Preliminary Report (09-13-24 @ 07:54):    Testing in progress    Culture - Blood (collected 09-11-24 @ 20:13)  Source: .Blood Blood-Peripheral  Preliminary Report (09-13-24 @ 02:03):    No growth at 24 hours    Culture - Other (collected 09-11-24 @ 16:37)  Source: .Other Left Plantar Hallux  Preliminary Report (09-12-24 @ 22:56):    Rare Enterobacter cloacae complex    Few Staphylococcus aureus    WBC Count: 7.17 K/uL (09-13-24 @ 09:35)  WBC Count: 8.52 K/uL (09-12-24 @ 08:46)  WBC Count: 9.03 K/uL (09-11-24 @ 20:10)    Creatinine: 0.88 mg/dL (09-13-24 @ 09:35)  Creatinine: 0.76 mg/dL (09-12-24 @ 08:46)  Creatinine: 0.75 mg/dL (09-11-24 @ 20:10)    C-Reactive Protein: <3 mg/L (09-12-24 @ 08:46)  C-Reactive Protein: <3 mg/L (09-11-24 @ 20:10)    Ferritin: 38 ng/mL (09-12-24 @ 08:46)    Sedimentation Rate, Erythrocyte: 18 mm/hr (09-12-24 @ 08:46)  Sedimentation Rate, Erythrocyte: 20 mm/hr (09-11-24 @ 20:10)    All imaging and other data have been reviewed.  < from: MR Foot w/wo IV Cont, Left (09.12.24 @ 11:49) >  IMPRESSION:  1.  Plantar great toe skin wound with small subjacent fluid collection   and marked surrounding soft tissue enhancement concerning for synovitis   with an evolving infected collection.  2.  Abnormal marrow signal within the first distal phalanx and the medial   margin of the first proximal phalangeal head. Given the overlying skin   wound, findings are concerning for osteomyelitis.    Assessment and Plan:   70yo man with PMH of HTN and DM2 was admitted with left 1st toe infection. He was recently on vacation in [facility] and while he was there he stepped on a pine needle that was 2nd week of August on the 1st L toe   according to him and it was getting red and swollen , his wife is a physician ( neurologist ) and his family member in [place] as well is physician and he was prescribed antibiotics, per podiatry note Augmentin and flagyl.  then [place] came to [place]1 he was still having the L toe ulcer and was not improving so he went to his PCP who prescribed for him cefprozil.   he was seen by podiatry that recommended wound care center and on eval was found to have probing down to bone so was advised to come to ED.    # Left 1st toe infection r/o OM  # R/o fungal infection? Sporotrichosis     - Blood cultures NGTD  - Fungal culture is pending  - Wound culture with enterobacter and staph aureus   - Will monitor WBC and Tmx both normal  - Podiatry follow up  - MRI result noted   - Continue zosyn 3.375gm qh for now until cultures sensitivites are back     Will follow.  Dr. Royal is covering me this weekend, he can be reached via teams.     Rhett Ojeda MD  Division of Infectious Diseases   Please call ID service at [phone] with any question.    50 minutes spent on total encounter assessing patient, examination, chart review, counseling and coordinating care by the attending physician/nurse/care manager.

## 2024-09-13 NOTE — BRIEF OPERATIVE NOTE - NSICDXBRIEFPREOP_GEN_ALL_CORE_FT
PRE-OP DIAGNOSIS:  Osteomyelitis of left foot 13-Sep-2024 20:39:47  Madeleine Becerril  Diabetic ulcer of left foot 13-Sep-2024 20:39:59  Madeleine Becerril

## 2024-09-13 NOTE — PROGRESS NOTE ADULT - SUBJECTIVE AND OBJECTIVE BOX
[facility] -- Douglas Hernandes, Mj Witt Savella, , Ace Rubio  Office # [phone]    Follow Up: Cardiac Optimization/HTN       Subjective/Observations: c/o toe pain.  However, denies any form of respiratory or cardiac discomfort.  Tolerating RA    REVIEW OF SYSTEMS: All other review of systems is negative unless indicated above  PAST MEDICAL & SURGICAL HISTORY:    MEDICATIONS  (STANDING):  atorvastatin 20 milliGRAM(s) Oral at bedtime  dextrose 5%. 1000 milliLiter(s) (50 mL/Hr) IV Continuous <Continuous>  dextrose 5%. 1000 milliLiter(s) (100 mL/Hr) IV Continuous <Continuous>  dextrose 50% Injectable 12.5 Gram(s) IV Push once  dextrose 50% Injectable 25 Gram(s) IV Push once  dextrose 50% Injectable 25 Gram(s) IV Push once  glucagon  Injectable 1 milliGRAM(s) IntraMuscular once  heparin   Injectable 5000 Unit(s) SubCutaneous every 8 hours  influenza  Vaccine (HIGH DOSE) 0.5 milliLiter(s) IntraMuscular once  insulin glargine Injectable (LANTUS) 8 Unit(s) SubCutaneous at bedtime  insulin lispro (ADMELOG) corrective regimen sliding scale   SubCutaneous three times a day before meals  lisinopril 20 milliGRAM(s) Oral every 12 hours  piperacillin/tazobactam IVPB.. 3.375 Gram(s) IV Intermittent every 8 hours  saccharomyces boulardii 250 milliGRAM(s) Oral two times a day  sodium chloride 0.9%. 1000 milliLiter(s) (70 mL/Hr) IV Continuous <Continuous>    MEDICATIONS  (PRN):  dextrose Oral Gel 15 Gram(s) Oral once PRN Blood Glucose LESS THAN 70 milliGRAM(s)/deciliter    Allergies    No Known Allergies    Intolerances      Vital Signs Last 24 Hrs  T(C): 36.4 (13 Sep 2024 04:57), Max: 36.6 (12 Sep 2024 21:19)  T(F): 97.5 (13 Sep 2024 04:57), Max: 97.8 (12 Sep 2024 21:19)  HR: 55 (13 Sep 2024 04:57) (55 - 68)  BP: 148/75 (13 Sep 2024 04:57) (108/64 - 148/75)  BP(mean): --  RR: 19 (13 Sep 2024 04:57) (19 - 19)  SpO2: 94% (13 Sep 2024 04:57) (92% - 94%)    Parameters below as of 13 Sep 2024 04:57  Patient On (Oxygen Delivery Method): room air      I&O's Summary    Weight (kg): 77.1 (09-12 @ 15:59)  PHYSICAL EXAM:  TELE: Not on tele  Constitutional: NAD, awake and alert, well-developed  HEENT: Moist Mucous Membranes, Anicteric  Pulmonary: Non-labored, breath sounds are clear bilaterally, No wheezing, rales or rhonchi  Cardiovascular: Regular, S1 and S2, No murmurs, rubs, gallops or clicks  Gastrointestinal: Bowel Sounds present, soft, nontender.   Lymph: No peripheral edema. No lymphadenopathy.  Skin: No visible rashes.  Left toe ulcer with dressing dry and intact.  Psych:  Mood & affect appropriate  LABS: All Labs Reviewed:                        11.8   7.17  )-----------( 364      ( 13 Sep 2024 09:35 )             38.8                         12.1   8.52  )-----------( 360      ( 12 Sep 2024 08:46 )             40.0                         12.3   9.03  )-----------( 354      ( 11 Sep 2024 20:10 )             39.9     13 Sep 2024 09:35    140    |  103    |  15     ----------------------------<  201    4.6     |  31     |  0.88   12 Sep 2024 08:46    138    |  104    |  11     ----------------------------<  208    4.5     |  32     |  0.76   11 Sep 2024 20:10    139    |  105    |  14     ----------------------------<  137    4.2     |  26     |  0.75     Ca    9.1        13 Sep 2024 09:35  Ca    9.2        12 Sep 2024 08:46  Ca    9.1        11 Sep 2024 20:10  Phos  3.4       13 Sep 2024 09:35  Mg     1.9       13 Sep 2024 09:35    TPro  7.3    /  Alb  3.4    /  TBili  0.5    /  DBili  x      /  AST  14     /  ALT  22     /  AlkPhos  76     12 Sep 2024 08:46  TPro  7.4    /  Alb  3.8    /  TBili  0.2    /  DBili  x      /  AST  16     /  ALT  23     /  AlkPhos  73     11 Sep 2024 20:10    PT/INR - ( 13 Sep 2024 09:35 )   PT: 13.4 sec;   INR: 1.18 ratio      12 Lead ECG:   Ventricular Rate 59 BPM    Atrial Rate 59 BPM    P-R Interval 174 ms    QRS Duration 88 ms    Q-T Interval 418 ms    QTC Calculation(Bazett) 413 ms    P Axis -14 degrees    R Axis 61 degrees    T Axis 69 degrees    Diagnosis Line Sinus bradycardia  Septal infarct , age undetermined  Confirmed by YANET WITT ([id number]) on 9/12/2024 8:49:17 AM (09-12-24 @ 07:13)

## 2024-09-13 NOTE — PATIENT PROFILE ADULT - FALL HARM RISK - HARM RISK INTERVENTIONS

## 2024-09-13 NOTE — DIETITIAN INITIAL EVALUATION ADULT - PERTINENT LABORATORY DATA
09-12    138  |  104  |  11  ----------------------------<  208<H>  4.5   |  32<H>  |  0.76    Ca    9.2      12 Sep 2024 08:46    TPro  7.3  /  Alb  3.4  /  TBili  0.5  /  DBili  x   /  AST  14<L>  /  ALT  22  /  AlkPhos  76  09-12  POCT Blood Glucose.: 189 mg/dL (09-13-24 @ 07:36)  A1C with Estimated Average Glucose Result: 9.3 % (09-12-24 @ 08:46)

## 2024-09-13 NOTE — BRIEF OPERATIVE NOTE - NSICDXBRIEFPROCEDURE_GEN_ALL_CORE_FT
PROCEDURES:  Debridement, soft tissue and bone, foot or ankle 13-Sep-2024 20:39:36  Madeleine Becerril

## 2024-09-13 NOTE — DIETITIAN INITIAL EVALUATION ADULT - PERTINENT MEDS FT
MEDICATIONS  (STANDING):  atorvastatin 20 milliGRAM(s) Oral at bedtime  dextrose 5%. 1000 milliLiter(s) (100 mL/Hr) IV Continuous <Continuous>  dextrose 5%. 1000 milliLiter(s) (50 mL/Hr) IV Continuous <Continuous>  dextrose 50% Injectable 12.5 Gram(s) IV Push once  dextrose 50% Injectable 25 Gram(s) IV Push once  dextrose 50% Injectable 25 Gram(s) IV Push once  glucagon  Injectable 1 milliGRAM(s) IntraMuscular once  heparin   Injectable 5000 Unit(s) SubCutaneous every 8 hours  influenza  Vaccine (HIGH DOSE) 0.5 milliLiter(s) IntraMuscular once  insulin glargine Injectable (LANTUS) 8 Unit(s) SubCutaneous at bedtime  insulin lispro (ADMELOG) corrective regimen sliding scale   SubCutaneous three times a day before meals  lisinopril 20 milliGRAM(s) Oral every 12 hours  piperacillin/tazobactam IVPB.. 3.375 Gram(s) IV Intermittent every 8 hours  saccharomyces boulardii 250 milliGRAM(s) Oral two times a day    MEDICATIONS  (PRN):  dextrose Oral Gel 15 Gram(s) Oral once PRN Blood Glucose LESS THAN 70 milliGRAM(s)/deciliter

## 2024-09-14 DIAGNOSIS — I10 ESSENTIAL (PRIMARY) HYPERTENSION: ICD-10-CM

## 2024-09-14 DIAGNOSIS — E78.5 HYPERLIPIDEMIA, UNSPECIFIED: ICD-10-CM

## 2024-09-14 LAB
-  AMOXICILLIN/CLAVULANIC ACID: SIGNIFICANT CHANGE UP
-  AMPICILLIN/SULBACTAM: SIGNIFICANT CHANGE UP
-  AMPICILLIN: SIGNIFICANT CHANGE UP
-  AZTREONAM: SIGNIFICANT CHANGE UP
-  CEFAZOLIN: SIGNIFICANT CHANGE UP
-  CEFEPIME: SIGNIFICANT CHANGE UP
-  CEFOXITIN: SIGNIFICANT CHANGE UP
-  CEFTAZIDIME/AVIBACTAM: SIGNIFICANT CHANGE UP
-  CEFTOLOZANE/TAZOBACTAM: SIGNIFICANT CHANGE UP
-  CEFTRIAXONE: SIGNIFICANT CHANGE UP
-  CIPROFLOXACIN: SIGNIFICANT CHANGE UP
-  ERTAPENEM: SIGNIFICANT CHANGE UP
-  GENTAMICIN: SIGNIFICANT CHANGE UP
-  IMIPENEM: SIGNIFICANT CHANGE UP
-  LEVOFLOXACIN: SIGNIFICANT CHANGE UP
-  MEROPENEM: SIGNIFICANT CHANGE UP
-  PIPERACILLIN/TAZOBACTAM: SIGNIFICANT CHANGE UP
-  TOBRAMYCIN: SIGNIFICANT CHANGE UP
-  TRIMETHOPRIM/SULFAMETHOXAZOLE: SIGNIFICANT CHANGE UP
ANION GAP SERPL CALC-SCNC: 3 MMOL/L — LOW (ref 5–17)
BLANDM BLD POS QL PROBE: SIGNIFICANT CHANGE UP
BUN SERPL-MCNC: 14 MG/DL — SIGNIFICANT CHANGE UP (ref 7–23)
CALCIUM SERPL-MCNC: 9.3 MG/DL — SIGNIFICANT CHANGE UP (ref 8.5–10.1)
CHLORIDE SERPL-SCNC: 105 MMOL/L — SIGNIFICANT CHANGE UP (ref 96–108)
CO2 SERPL-SCNC: 32 MMOL/L — HIGH (ref 22–31)
CREAT SERPL-MCNC: 0.78 MG/DL — SIGNIFICANT CHANGE UP (ref 0.5–1.3)
CULTURE RESULTS: ABNORMAL
EGFR: 96 ML/MIN/1.73M2 — SIGNIFICANT CHANGE UP
GLUCOSE BLDC GLUCOMTR-MCNC: 178 MG/DL — HIGH (ref 70–99)
GLUCOSE BLDC GLUCOMTR-MCNC: 235 MG/DL — HIGH (ref 70–99)
GLUCOSE BLDC GLUCOMTR-MCNC: 242 MG/DL — HIGH (ref 70–99)
GLUCOSE SERPL-MCNC: 286 MG/DL — HIGH (ref 70–99)
GRAM STN FLD: SIGNIFICANT CHANGE UP
HCT VFR BLD CALC: 39.1 % — SIGNIFICANT CHANGE UP (ref 39–50)
HGB BLD-MCNC: 11.9 G/DL — LOW (ref 13–17)
MAGNESIUM SERPL-MCNC: 1.9 MG/DL — SIGNIFICANT CHANGE UP (ref 1.6–2.6)
MCHC RBC-ENTMCNC: 25.6 PG — LOW (ref 27–34)
MCHC RBC-ENTMCNC: 30.4 GM/DL — LOW (ref 32–36)
MCV RBC AUTO: 84.3 FL — SIGNIFICANT CHANGE UP (ref 80–100)
METHOD TYPE: SIGNIFICANT CHANGE UP
METHOD TYPE: SIGNIFICANT CHANGE UP
NRBC # BLD: 0 /100 WBCS — SIGNIFICANT CHANGE UP (ref 0–0)
OB PNL STL: NEGATIVE — SIGNIFICANT CHANGE UP
ORGANISM # SPEC MICROSCOPIC CNT: ABNORMAL
ORGANISM # SPEC MICROSCOPIC CNT: SIGNIFICANT CHANGE UP
PHOSPHATE SERPL-MCNC: 4.2 MG/DL — SIGNIFICANT CHANGE UP (ref 2.5–4.5)
PLATELET # BLD AUTO: 384 K/UL — SIGNIFICANT CHANGE UP (ref 150–400)
POTASSIUM SERPL-MCNC: 4.4 MMOL/L — SIGNIFICANT CHANGE UP (ref 3.5–5.3)
POTASSIUM SERPL-SCNC: 4.4 MMOL/L — SIGNIFICANT CHANGE UP (ref 3.5–5.3)
RBC # BLD: 4.64 M/UL — SIGNIFICANT CHANGE UP (ref 4.2–5.8)
RBC # FLD: 14.6 % — HIGH (ref 10.3–14.5)
SODIUM SERPL-SCNC: 140 MMOL/L — SIGNIFICANT CHANGE UP (ref 135–145)
SPECIMEN SOURCE: SIGNIFICANT CHANGE UP
WBC # BLD: 10.45 K/UL — SIGNIFICANT CHANGE UP (ref 3.8–10.5)
WBC # FLD AUTO: 10.45 K/UL — SIGNIFICANT CHANGE UP (ref 3.8–10.5)

## 2024-09-14 PROCEDURE — 99232 SBSQ HOSP IP/OBS MODERATE 35: CPT

## 2024-09-14 RX ORDER — HEPARIN SODIUM,BOVINE 1000/ML
5000 VIAL (ML) INJECTION EVERY 8 HOURS
Refills: 0 | Status: DISCONTINUED | OUTPATIENT
Start: 2024-09-13 | End: 2024-09-17

## 2024-09-14 RX ORDER — INSULIN GLARGINE 100 [IU]/ML
8 INJECTION, SOLUTION SUBCUTANEOUS AT BEDTIME
Refills: 0 | Status: DISCONTINUED | OUTPATIENT
Start: 2024-09-13 | End: 2024-09-14

## 2024-09-14 RX ORDER — INSULIN GLARGINE 100 [IU]/ML
12 INJECTION, SOLUTION SUBCUTANEOUS AT BEDTIME
Refills: 0 | Status: DISCONTINUED | OUTPATIENT
Start: 2024-09-14 | End: 2024-09-15

## 2024-09-14 RX ORDER — ONDANSETRON 2 MG/ML
4 INJECTION, SOLUTION INTRAMUSCULAR; INTRAVENOUS ONCE
Refills: 0 | Status: DISCONTINUED | OUTPATIENT
Start: 2024-09-13 | End: 2024-09-17

## 2024-09-14 RX ORDER — HYDROMORPHONE HYDROCHLORIDE 2 MG/1
0.5 TABLET ORAL
Refills: 0 | Status: DISCONTINUED | OUTPATIENT
Start: 2024-09-13 | End: 2024-09-17

## 2024-09-14 RX ORDER — OXYCODONE HYDROCHLORIDE 5 MG/1
5 TABLET ORAL EVERY 4 HOURS
Refills: 0 | Status: DISCONTINUED | OUTPATIENT
Start: 2024-09-14 | End: 2024-09-17

## 2024-09-14 RX ORDER — OXYCODONE HYDROCHLORIDE 5 MG/1
5 TABLET ORAL ONCE
Refills: 0 | Status: DISCONTINUED | OUTPATIENT
Start: 2024-09-13 | End: 2024-09-14

## 2024-09-14 RX ADMIN — Medication 5000 UNIT(S): at 13:25

## 2024-09-14 RX ADMIN — PIPERACILLIN SODIUM AND TAZOBACTAM SODIUM 25 GRAM(S): 3; .375 INJECTION, POWDER, FOR SOLUTION INTRAVENOUS at 22:47

## 2024-09-14 RX ADMIN — OXYCODONE HYDROCHLORIDE 5 MILLIGRAM(S): 5 TABLET ORAL at 01:17

## 2024-09-14 RX ADMIN — Medication 2: at 11:44

## 2024-09-14 RX ADMIN — PIPERACILLIN SODIUM AND TAZOBACTAM SODIUM 25 GRAM(S): 3; .375 INJECTION, POWDER, FOR SOLUTION INTRAVENOUS at 13:25

## 2024-09-14 RX ADMIN — RDII 250 MG CAPSULE 250 MILLIGRAM(S): at 18:12

## 2024-09-14 RX ADMIN — OXYCODONE HYDROCHLORIDE 5 MILLIGRAM(S): 5 TABLET ORAL at 14:35

## 2024-09-14 RX ADMIN — LISINOPRIL 20 MILLIGRAM(S): 10 TABLET ORAL at 05:42

## 2024-09-14 RX ADMIN — Medication 4: at 16:48

## 2024-09-14 RX ADMIN — OXYCODONE HYDROCHLORIDE 5 MILLIGRAM(S): 5 TABLET ORAL at 02:17

## 2024-09-14 RX ADMIN — OXYCODONE HYDROCHLORIDE 5 MILLIGRAM(S): 5 TABLET ORAL at 13:25

## 2024-09-14 RX ADMIN — LISINOPRIL 20 MILLIGRAM(S): 10 TABLET ORAL at 18:12

## 2024-09-14 RX ADMIN — INSULIN GLARGINE 8 UNIT(S): 100 INJECTION, SOLUTION SUBCUTANEOUS at 00:16

## 2024-09-14 RX ADMIN — RDII 250 MG CAPSULE 250 MILLIGRAM(S): at 05:42

## 2024-09-14 RX ADMIN — PIPERACILLIN SODIUM AND TAZOBACTAM SODIUM 25 GRAM(S): 3; .375 INJECTION, POWDER, FOR SOLUTION INTRAVENOUS at 05:41

## 2024-09-14 RX ADMIN — Medication 6: at 07:56

## 2024-09-14 RX ADMIN — Medication 20 MILLIGRAM(S): at 22:47

## 2024-09-14 RX ADMIN — Medication 5000 UNIT(S): at 05:41

## 2024-09-14 RX ADMIN — Medication 5000 UNIT(S): at 22:46

## 2024-09-14 RX ADMIN — INSULIN GLARGINE 12 UNIT(S): 100 INJECTION, SOLUTION SUBCUTANEOUS at 22:47

## 2024-09-14 NOTE — PROGRESS NOTE ADULT - ASSESSMENT
70yo M with PMH of Type 2 DM, HTN, HLD p/w non-healing left hallux wound a/w diabetic foot infection with suspected left hallux OM.     # non healing left hallux ulcer with clinical suspicion for OM  #diabetic foot infection   - s/p PO Augmentin , flagyl and subsequently Cefprozil as outpatient but did not heal  - clinically probe to bone per podiatry >> clinical OM  - MRI left foot w/ and w/out Gadolinium showed: "1.  Plantar great toe skin wound with small subjacent fluid collection and marked surrounding soft tissue enhancement concerning for synovitis with an evolving infected collection.  2.  Abnormal marrow signal within the first distal phalanx and the medial margin of the first proximal phalangeal head. Given the overlying skin wound, findings are concerning for osteomyelitis.  - trend ESR and CRP  - treat with zosyn empirically per ID and adjust abx based on cultures  - no leukocytosis and no fever   - f/up BCx NGTD  - f/up operative cultures and pathology  - ID consulted, Dr. Ojeda, recs appreciated  - podiatry (Dr. Becerril), recs appreciated   - consulted and evaluated pt and per eval probe to pone clinically OM , recommended MRI as above mentioned  - vascular consulted (Dr. Lerma), recs appreciated -- may proceed with podiatric procedure  - podiatry discussed options with the pt and pt's wife and decision was to pursue bone biopsy and potential long course of IV abx if OM is confirmed -- pt does not wish to pursue any amputation  - Oxycodone PRN foot pain  - s/p OR debridement 9/13      # DM type 2  - hold home oral DM medications   - on home lantus 12-15 U at bedtime  - raise Lantus dose to 12 units tonight given AM hyperglycemia; will consider standing meal time short acting insulin  - c/w mod-dose correctional ISS  - f/u BG , target BG during admission 100-180   - Hgb A1c of 9.3%  - gave education on DM2 management to pt and pt's wife  - diabetes NP consulted for further education    # HLD  - c/w home atorvastatin 20 mg   - f/u lipid panel   - of note, patient was prescribed per his PCP ASA but he did not wish to take     # HTN  - home ramipril 5 mg Q 12 hrs interchanged with lisinopril 20 mg Q 12 hrs due to pharmacy availability     # mild anemia   per chart from PCP seem pt known to have anemia   anemia w/u ( TIBC, iron , ferritin , reticulocyte count , LDH and occult blood )  counseled on colon cancer screening   per [facility] has been seen by GI this month    # thyroid nodules   follows with endocrinology as well as ENT per notes available on [facility] from June 2024  monitor TFTs - outpatient f/up    DVT ppx: heparin SQ  full code

## 2024-09-14 NOTE — PROGRESS NOTE ADULT - ASSESSMENT
69 Y.O. M with PMH of DM , HTN, HLD presented to the ED with non-healing wound on the left big toe c/w OM.  Cardiology was consulted for pre-surgical clearance prior to Podiatric surgical intervention.    - S/P Surgical LLE debridement soft tissue and bone debridement,  tolerated well  - No acute changes on EKG compared to previous.  - No meaningful evidence of volume overload.    - Pt has no active ischemia, decompensated heart failure, unstable arrythmia, or severe stenotic valvular disease, and has RCRI score of 1.  -- BP well controlled   - Continue Lisinopril 20 mg  - Continue Atorvastatin 20 mg    - Monitor and replete lytes, keep K>4, Mg>2.  - Will continue to follow.    Camille Adame, MS ANP, AGACNP  Nurse Practitioner- Cardiology   Call TEAMS 69 Y.O. M with PMH of DM , HTN, HLD presented to the ED with non-healing wound on the left big toe c/w OM.  Cardiology was consulted for pre-surgical clearance prior to Podiatric surgical intervention.    - S/P Surgical LLE debridement soft tissue and bone debridement,  tolerated well  - No acute changes on EKG compared to previous.  - No meaningful evidence of volume overload.    - Pt has no active ischemia, decompensated heart failure, unstable arrythmia, or severe stenotic valvular disease, and has RCRI score of 1.  - BP well controlled   - Continue Lisinopril 20 mg  - Continue Atorvastatin 20 mg    - Monitor and replete lytes, keep K>4, Mg>2.  - Will continue to follow.    Camille Adame, MS ANP, AGACNP  Nurse Practitioner- Cardiology   Call TEAMS

## 2024-09-14 NOTE — PROGRESS NOTE ADULT - SUBJECTIVE AND OBJECTIVE BOX
Patient is a 69y old  Male who presents with a chief complaint of suspected OM of left hallux (13 Sep 2024 16:56)      INTERVAL History of Present Illness/OVERNIGHT EVENTS: s/p OR debridement yesterday by Podiatrist.  No new complaints.    MEDICATIONS  (STANDING):  atorvastatin 20 milliGRAM(s) Oral at bedtime  dextrose 5%. 1000 milliLiter(s) (50 mL/Hr) IV Continuous <Continuous>  dextrose 50% Injectable 25 Gram(s) IV Push once  dextrose 50% Injectable 12.5 Gram(s) IV Push once  dextrose 50% Injectable 25 Gram(s) IV Push once  glucagon  Injectable 1 milliGRAM(s) IntraMuscular once  heparin   Injectable 5000 Unit(s) SubCutaneous every 8 hours  influenza  Vaccine (HIGH DOSE) 0.5 milliLiter(s) IntraMuscular once  insulin glargine Injectable (LANTUS) 12 Unit(s) SubCutaneous at bedtime  insulin lispro (ADMELOG) corrective regimen sliding scale   SubCutaneous three times a day before meals  insulin lispro (ADMELOG) corrective regimen sliding scale   SubCutaneous at bedtime  lisinopril 20 milliGRAM(s) Oral every 12 hours  piperacillin/tazobactam IVPB.. 3.375 Gram(s) IV Intermittent every 8 hours  saccharomyces boulardii 250 milliGRAM(s) Oral two times a day    MEDICATIONS  (PRN):  dextrose Oral Gel 15 Gram(s) Oral once PRN Blood Glucose LESS THAN 70 milliGRAM(s)/deciliter  HYDROmorphone  Injectable 0.5 milliGRAM(s) IV Push every 10 minutes PRN Moderate Pain (4 - 6)  ondansetron Injectable 4 milliGRAM(s) IV Push once PRN Nausea and/or Vomiting  oxyCODONE    IR 5 milliGRAM(s) Oral every 4 hours PRN Severe Pain (7 - 10)      Allergies    No Known Allergies    Intolerances        REVIEW OF SYSTEMS:  Other systems currently negative unless otherwise specified above.    Vital Signs Last 24 Hrs  T(C): 36.6 (14 Sep 2024 12:06), Max: 36.9 (13 Sep 2024 17:13)  T(F): 97.8 (14 Sep 2024 12:06), Max: 98.5 (13 Sep 2024 17:13)  HR: 65 (14 Sep 2024 12:06) (53 - 76)  BP: 133/73 (14 Sep 2024 12:06) (119/69 - 173/77)  BP(mean): 86 (13 Sep 2024 17:13) (86 - 86)  RR: 19 (14 Sep 2024 12:06) (18 - 22)  SpO2: 97% (14 Sep 2024 12:06) (93% - 99%)    Parameters below as of 14 Sep 2024 12:06  Patient On (Oxygen Delivery Method): room air        Height (cm): 182.9 (09-13 @ 17:15)  Weight (kg): 77.2 (09-13 @ 17:15)  BMI (kg/m2): 23.1 (09-13 @ 17:15)  BSA (m2): 1.99 (09-13 @ 17:15)    PHYSICAL EXAM:  GENERAL: No apparent distress, appears stated age  EYES: Conjunctiva and sclera clear, no discharge  ENMT: Moist mucous membranes, no nasal discharge  CHEST/LUNG: Clear to auscultation bilaterally, no wheeze or rales  HEART: Regular rhythm, no rubs or gallops  ABDOMEN: Soft, Nontender, Nondistended  EXTREMITIES: Foot dressing      LABS:                        11.9   10.45 )-----------( 384      ( 14 Sep 2024 07:56 )             39.1     14 Sep 2024 07:56    140    |  105    |  14     ----------------------------<  286    4.4     |  32     |  0.78     Ca    9.3        14 Sep 2024 07:56  Phos  4.2       14 Sep 2024 07:56  Mg     1.9       14 Sep 2024 07:56      PT/INR - ( 13 Sep 2024 09:35 )   PT: 13.4 sec;   INR: 1.18 ratio           Urinalysis Basic - ( 14 Sep 2024 07:56 )    Color: x / Appearance: x / SG: x / pH: x  Gluc: 286 mg/dL / Ketone: x  / Bili: x / Urobili: x   Blood: x / Protein: x / Nitrite: x   Leuk Esterase: x / RBC: x / WBC x   Sq Epi: x / Non Sq Epi: x / Bacteria: x      CAPILLARY BLOOD GLUCOSE      POCT Blood Glucose.: 178 mg/dL (14 Sep 2024 11:36)  POCT Blood Glucose.: 298 mg/dL (14 Sep 2024 07:40)  POCT Blood Glucose.: 388 mg/dL (13 Sep 2024 23:49)  POCT Blood Glucose.: 160 mg/dL (13 Sep 2024 19:56)  POCT Blood Glucose.: 184 mg/dL (13 Sep 2024 16:55)  POCT Blood Glucose.: 205 mg/dL (13 Sep 2024 13:33)    Height (cm): 182.9 (09-13 @ 17:15)  Weight (kg): 77.2 (09-13 @ 17:15)  BMI (kg/m2): 23.1 (09-13 @ 17:15)  BSA (m2): 1.99 (09-13 @ 17:15)    RADIOLOGY & ADDITIONAL TESTS:      Images reviewed personally    Consultant Notes Reviewed and Care Discussed with relevant Consultants.

## 2024-09-14 NOTE — PROGRESS NOTE ADULT - SUBJECTIVE AND OBJECTIVE BOX
[facility] -- Douglas Hernandes, Mj Witt Savella, Goodger, Cohen: Office # [phone]    Follow Up:  Cardiac Optimization S/P Debridement foot wound, HTN        Subjective/Observations: Patient seen and examined, awake, alert, resting comfortably in bed, denies chest pain, dyspnea, palpitations or dizziness, orthopnea and PND. Tolerating room air. ABX infusing     REVIEW OF SYSTEMS: All review of systems is negative for eye, ENT, GI, , allergic, dermatologic, musculoskeletal and neurologic except as described above    PAST MEDICAL & SURGICAL HISTORY:      MEDICATIONS  (STANDING):  atorvastatin 20 milliGRAM(s) Oral at bedtime  dextrose 5%. 1000 milliLiter(s) (50 mL/Hr) IV Continuous <Continuous>  dextrose 50% Injectable 25 Gram(s) IV Push once  dextrose 50% Injectable 12.5 Gram(s) IV Push once  dextrose 50% Injectable 25 Gram(s) IV Push once  glucagon  Injectable 1 milliGRAM(s) IntraMuscular once  heparin   Injectable 5000 Unit(s) SubCutaneous every 8 hours  influenza  Vaccine (HIGH DOSE) 0.5 milliLiter(s) IntraMuscular once  insulin glargine Injectable (LANTUS) 12 Unit(s) SubCutaneous at bedtime  insulin lispro (ADMELOG) corrective regimen sliding scale   SubCutaneous three times a day before meals  insulin lispro (ADMELOG) corrective regimen sliding scale   SubCutaneous at bedtime  lisinopril 20 milliGRAM(s) Oral every 12 hours  piperacillin/tazobactam IVPB.. 3.375 Gram(s) IV Intermittent every 8 hours  saccharomyces boulardii 250 milliGRAM(s) Oral two times a day    MEDICATIONS  (PRN):  dextrose Oral Gel 15 Gram(s) Oral once PRN Blood Glucose LESS THAN 70 milliGRAM(s)/deciliter  HYDROmorphone  Injectable 0.5 milliGRAM(s) IV Push every 10 minutes PRN Moderate Pain (4 - 6)  ondansetron Injectable 4 milliGRAM(s) IV Push once PRN Nausea and/or Vomiting  oxyCODONE    IR 5 milliGRAM(s) Oral every 4 hours PRN Severe Pain (7 - 10)    Allergies    No Known Allergies    Intolerances      Vital Signs Last 24 Hrs  T(C): 36.6 (14 Sep 2024 12:06), Max: 36.9 (13 Sep 2024 17:13)  T(F): 97.8 (14 Sep 2024 12:06), Max: 98.5 (13 Sep 2024 17:13)  HR: 65 (14 Sep 2024 12:06) (53 - 76)  BP: 133/73 (14 Sep 2024 12:06) (119/69 - 173/77)  BP(mean): 86 (13 Sep 2024 17:13) (86 - 86)  RR: 19 (14 Sep 2024 12:06) (18 - 22)  SpO2: 97% (14 Sep 2024 12:06) (93% - 99%)    Parameters below as of 14 Sep 2024 12:06  Patient On (Oxygen Delivery Method): room air      I&O's Summary    13 Sep 2024 07:01  -  14 Sep 2024 07:00  --------------------------------------------------------  IN: 0 mL / OUT: 125 mL / NET: -125 mL      Weight (kg): 77.2 (09-13 @ 17:15)    TELE: Not on telemetry   PHYSICAL EXAM:  Appearance: NAD, no distress, alert  HEENT: Moist Mucous Membranes, Anicteric  Cardiovascular: Regular rate and rhythm, Normal S1 S2, No JVD, No murmurs, No rubs, gallops or clicks  Respiratory: Non-labored, Clear to auscultation, No rales, No rhonchi, No wheezing.   Gastrointestinal:  Soft, Non-tender, + BS  Skin: Warm and dry, No visible rashes or ulcers, No ecchymosis, No cyanosis  Musculoskeletal: No clubbing, No cyanosis, Left LE + joint swelling/tenderness  Psychiatry: Mood & affect appropriate  Lymph: No peripheral edema.     LABS: All Labs Reviewed:                        11.9   10.45 )-----------( 384      ( 14 Sep 2024 07:56 )             39.1                         11.8   7.17  )-----------( 364      ( 13 Sep 2024 09:35 )             38.8                         12.1   8.52  )-----------( 360      ( 12 Sep 2024 08:46 )             40.0     14 Sep 2024 07:56    140    |  105    |  14     ----------------------------<  286    4.4     |  32     |  0.78   13 Sep 2024 09:35    140    |  103    |  15     ----------------------------<  201    4.6     |  31     |  0.88   12 Sep 2024 08:46    138    |  104    |  11     ----------------------------<  208    4.5     |  32     |  0.76     Ca    9.3        14 Sep 2024 07:56  Ca    9.1        13 Sep 2024 09:35  Ca    9.2        12 Sep 2024 08:46  Phos  4.2       14 Sep 2024 07:56  Phos  3.4       13 Sep 2024 09:35  Mg     1.9       14 Sep 2024 07:56  Mg     1.9       13 Sep 2024 09:35    TPro  7.3    /  Alb  3.4    /  TBili  0.5    /  DBili  x      /  AST  14     /  ALT  22     /  AlkPhos  76     12 Sep 2024 08:46  TPro  7.4    /  Alb  3.8    /  TBili  0.2    /  DBili  x      /  AST  16     /  ALT  23     /  AlkPhos  73     11 Sep 2024 20:10    PT/INR - ( 13 Sep 2024 09:35 )   PT: 13.4 sec;   INR: 1.18 ratio    Cholesterol: 99 mg/dL (09-12-24 @ 08:46)  HDL Cholesterol: 32 mg/dL (09-12-24 @ 08:46)  Triglycerides, Serum: 67 mg/dL (09-12-24 @ 08:46)      12 Lead ECG:   Ventricular Rate 59 BPM  Atrial Rate 59 BPM  P-R Interval 174 ms  QRS Duration 88 ms  Q-T Interval 418 ms  QTC Calculation(Bazett) 413 ms  P Axis -14 degrees  R Axis 61 degrees  T Axis 69 degrees  Diagnosis Line Sinus bradycardia  Septal infarct , age undetermined  Confirmed by YANET WITT ([id number]) on 9/12/2024 8:49:17 AM (09-12-24 @ 07:13)

## 2024-09-15 LAB
ALBUMIN SERPL ELPH-MCNC: 3.6 G/DL — SIGNIFICANT CHANGE UP (ref 3.3–5)
ALP SERPL-CCNC: 79 U/L — SIGNIFICANT CHANGE UP (ref 40–120)
ALT FLD-CCNC: 22 U/L — SIGNIFICANT CHANGE UP (ref 12–78)
ANION GAP SERPL CALC-SCNC: 7 MMOL/L — SIGNIFICANT CHANGE UP (ref 5–17)
AST SERPL-CCNC: 11 U/L — LOW (ref 15–37)
BILIRUB SERPL-MCNC: 0.3 MG/DL — SIGNIFICANT CHANGE UP (ref 0.2–1.2)
BUN SERPL-MCNC: 11 MG/DL — SIGNIFICANT CHANGE UP (ref 7–23)
CALCIUM SERPL-MCNC: 9.3 MG/DL — SIGNIFICANT CHANGE UP (ref 8.5–10.1)
CHLORIDE SERPL-SCNC: 101 MMOL/L — SIGNIFICANT CHANGE UP (ref 96–108)
CO2 SERPL-SCNC: 30 MMOL/L — SIGNIFICANT CHANGE UP (ref 22–31)
CREAT SERPL-MCNC: 0.94 MG/DL — SIGNIFICANT CHANGE UP (ref 0.5–1.3)
EGFR: 87 ML/MIN/1.73M2 — SIGNIFICANT CHANGE UP
GLUCOSE BLDC GLUCOMTR-MCNC: 231 MG/DL — HIGH (ref 70–99)
GLUCOSE SERPL-MCNC: 285 MG/DL — HIGH (ref 70–99)
GRAM STN FLD: ABNORMAL
HCT VFR BLD CALC: 38.3 % — LOW (ref 39–50)
HGB BLD-MCNC: 12.4 G/DL — LOW (ref 13–17)
MCHC RBC-ENTMCNC: 27 PG — SIGNIFICANT CHANGE UP (ref 27–34)
MCHC RBC-ENTMCNC: 32.4 GM/DL — SIGNIFICANT CHANGE UP (ref 32–36)
MCV RBC AUTO: 83.3 FL — SIGNIFICANT CHANGE UP (ref 80–100)
NRBC # BLD: 0 /100 WBCS — SIGNIFICANT CHANGE UP (ref 0–0)
PLATELET # BLD AUTO: 377 K/UL — SIGNIFICANT CHANGE UP (ref 150–400)
POTASSIUM SERPL-MCNC: 4.7 MMOL/L — SIGNIFICANT CHANGE UP (ref 3.5–5.3)
POTASSIUM SERPL-SCNC: 4.7 MMOL/L — SIGNIFICANT CHANGE UP (ref 3.5–5.3)
PROT SERPL-MCNC: 7.2 G/DL — SIGNIFICANT CHANGE UP (ref 6–8.3)
RBC # BLD: 4.6 M/UL — SIGNIFICANT CHANGE UP (ref 4.2–5.8)
RBC # FLD: 14.5 % — SIGNIFICANT CHANGE UP (ref 10.3–14.5)
SODIUM SERPL-SCNC: 138 MMOL/L — SIGNIFICANT CHANGE UP (ref 135–145)
WBC # BLD: 8.67 K/UL — SIGNIFICANT CHANGE UP (ref 3.8–10.5)
WBC # FLD AUTO: 8.67 K/UL — SIGNIFICANT CHANGE UP (ref 3.8–10.5)

## 2024-09-15 PROCEDURE — 99233 SBSQ HOSP IP/OBS HIGH 50: CPT

## 2024-09-15 PROCEDURE — 99232 SBSQ HOSP IP/OBS MODERATE 35: CPT

## 2024-09-15 RX ORDER — INSULIN GLARGINE 100 [IU]/ML
15 INJECTION, SOLUTION SUBCUTANEOUS AT BEDTIME
Refills: 0 | Status: DISCONTINUED | OUTPATIENT
Start: 2024-09-15 | End: 2024-09-16

## 2024-09-15 RX ADMIN — PIPERACILLIN SODIUM AND TAZOBACTAM SODIUM 25 GRAM(S): 3; .375 INJECTION, POWDER, FOR SOLUTION INTRAVENOUS at 21:56

## 2024-09-15 RX ADMIN — Medication 5000 UNIT(S): at 14:29

## 2024-09-15 RX ADMIN — OXYCODONE HYDROCHLORIDE 5 MILLIGRAM(S): 5 TABLET ORAL at 10:05

## 2024-09-15 RX ADMIN — LISINOPRIL 20 MILLIGRAM(S): 10 TABLET ORAL at 06:11

## 2024-09-15 RX ADMIN — INSULIN GLARGINE 15 UNIT(S): 100 INJECTION, SOLUTION SUBCUTANEOUS at 21:53

## 2024-09-15 RX ADMIN — Medication 3 UNIT(S): at 17:29

## 2024-09-15 RX ADMIN — Medication 5000 UNIT(S): at 06:12

## 2024-09-15 RX ADMIN — Medication 1: at 21:53

## 2024-09-15 RX ADMIN — Medication 5000 UNIT(S): at 21:56

## 2024-09-15 RX ADMIN — PIPERACILLIN SODIUM AND TAZOBACTAM SODIUM 25 GRAM(S): 3; .375 INJECTION, POWDER, FOR SOLUTION INTRAVENOUS at 14:29

## 2024-09-15 RX ADMIN — LISINOPRIL 20 MILLIGRAM(S): 10 TABLET ORAL at 17:27

## 2024-09-15 RX ADMIN — OXYCODONE HYDROCHLORIDE 5 MILLIGRAM(S): 5 TABLET ORAL at 11:05

## 2024-09-15 RX ADMIN — PIPERACILLIN SODIUM AND TAZOBACTAM SODIUM 25 GRAM(S): 3; .375 INJECTION, POWDER, FOR SOLUTION INTRAVENOUS at 06:12

## 2024-09-15 RX ADMIN — Medication 6: at 17:28

## 2024-09-15 RX ADMIN — RDII 250 MG CAPSULE 250 MILLIGRAM(S): at 06:12

## 2024-09-15 RX ADMIN — Medication 6: at 12:04

## 2024-09-15 RX ADMIN — Medication 20 MILLIGRAM(S): at 21:56

## 2024-09-15 RX ADMIN — Medication 4: at 07:52

## 2024-09-15 RX ADMIN — RDII 250 MG CAPSULE 250 MILLIGRAM(S): at 17:28

## 2024-09-15 NOTE — PROGRESS NOTE ADULT - SUBJECTIVE AND OBJECTIVE BOX
[facility] -- Douglas Hernandes, Mj Witt Savella, Goodger, Cohen: Office # [phone]    Follow Up:  Cardiac Optimization S/P Debridement foot wound, HTN   Subjective/Observations: Patient seen and examined, awake, alert, resting comfortably in bed, denies chest pain, dyspnea, palpitations or dizziness, orthopnea and PND. Tolerating room air.       REVIEW OF SYSTEMS: All review of systems is negative for eye, ENT, GI, , allergic, dermatologic, musculoskeletal and neurologic except as described above    PAST MEDICAL & SURGICAL HISTORY:      MEDICATIONS  (STANDING):  atorvastatin 20 milliGRAM(s) Oral at bedtime  dextrose 5%. 1000 milliLiter(s) (50 mL/Hr) IV Continuous <Continuous>  dextrose 50% Injectable 25 Gram(s) IV Push once  dextrose 50% Injectable 25 Gram(s) IV Push once  dextrose 50% Injectable 12.5 Gram(s) IV Push once  glucagon  Injectable 1 milliGRAM(s) IntraMuscular once  heparin   Injectable 5000 Unit(s) SubCutaneous every 8 hours  influenza  Vaccine (HIGH DOSE) 0.5 milliLiter(s) IntraMuscular once  insulin glargine Injectable (LANTUS) 12 Unit(s) SubCutaneous at bedtime  insulin lispro (ADMELOG) corrective regimen sliding scale   SubCutaneous three times a day before meals  insulin lispro (ADMELOG) corrective regimen sliding scale   SubCutaneous at bedtime  lisinopril 20 milliGRAM(s) Oral every 12 hours  piperacillin/tazobactam IVPB.. 3.375 Gram(s) IV Intermittent every 8 hours  saccharomyces boulardii 250 milliGRAM(s) Oral two times a day    MEDICATIONS  (PRN):  dextrose Oral Gel 15 Gram(s) Oral once PRN Blood Glucose LESS THAN 70 milliGRAM(s)/deciliter  HYDROmorphone  Injectable 0.5 milliGRAM(s) IV Push every 10 minutes PRN Moderate Pain (4 - 6)  ondansetron Injectable 4 milliGRAM(s) IV Push once PRN Nausea and/or Vomiting  oxyCODONE    IR 5 milliGRAM(s) Oral every 4 hours PRN Severe Pain (7 - 10)    Allergies    No Known Allergies    Intolerances      Vital Signs Last 24 Hrs  T(C): 36.6 (15 Sep 2024 05:04), Max: 36.8 (14 Sep 2024 21:10)  T(F): 97.8 (15 Sep 2024 05:04), Max: 98.3 (14 Sep 2024 21:10)  HR: 59 (15 Sep 2024 05:04) (59 - 73)  BP: 146/81 (15 Sep 2024 05:04) (119/70 - 160/76)  BP(mean): --  RR: 18 (15 Sep 2024 05:04) (18 - 18)  SpO2: 95% (15 Sep 2024 05:04) (95% - 95%)    Parameters below as of 15 Sep 2024 05:04  Patient On (Oxygen Delivery Method): room air      I&O's Summary        TELE: Not on telemetry   PHYSICAL EXAM:  Appearance: NAD, no distress, alert  HEENT: Moist Mucous Membranes, Anicteric  Cardiovascular: Regular rate and rhythm, Normal S1 S2, No JVD, No murmurs, No rubs, gallops or clicks  Respiratory: Non-labored, Clear to auscultation, No rales, No rhonchi, No wheezing.   Gastrointestinal:  Soft, Non-tender, + BS  Skin: Warm and dry, No visible rashes or ulcers, No ecchymosis, No cyanosis  Musculoskeletal: No clubbing, No cyanosis, LLE  + dressing and boot intact  Psychiatry: Mood & affect appropriate  Lymph: No peripheral edema.     LABS: All Labs Reviewed:                        12.4   8.67  )-----------( 377      ( 15 Sep 2024 09:10 )             38.3                         11.9   10.45 )-----------( 384      ( 14 Sep 2024 07:56 )             39.1                         11.8   7.17  )-----------( 364      ( 13 Sep 2024 09:35 )             38.8     15 Sep 2024 09:10    138    |  101    |  11     ----------------------------<  285    4.7     |  30     |  0.94   14 Sep 2024 07:56    140    |  105    |  14     ----------------------------<  286    4.4     |  32     |  0.78   13 Sep 2024 09:35    140    |  103    |  15     ----------------------------<  201    4.6     |  31     |  0.88     Ca    9.3        15 Sep 2024 09:10  Ca    9.3        14 Sep 2024 07:56  Ca    9.1        13 Sep 2024 09:35  Phos  4.2       14 Sep 2024 07:56  Phos  3.4       13 Sep 2024 09:35  Mg     1.9       14 Sep 2024 07:56  Mg     1.9       13 Sep 2024 09:35    TPro  7.2    /  Alb  3.6    /  TBili  0.3    /  DBili  x      /  AST  11     /  ALT  22     /  AlkPhos  79     15 Sep 2024 09:10    Cholesterol: 99 mg/dL (09-12-24 @ 08:46)  HDL Cholesterol: 32 mg/dL (09-12-24 @ 08:46)  Triglycerides, Serum: 67 mg/dL (09-12-24 @ 08:46)      12 Lead ECG:   Ventricular Rate 59 BPM  Atrial Rate 59 BPM  P-R Interval 174 ms  QRS Duration 88 ms  Q-T Interval 418 ms  QTC Calculation(Bazett) 413 ms  P Axis -14 degrees  R Axis 61 degrees  T Axis 69 degrees  Diagnosis Line Sinus bradycardia  Septal infarct , age undetermined  Confirmed by YANET WITT ([id number]) on 9/12/2024 8:49:17 AM (09-12-24 @ 07:13)

## 2024-09-15 NOTE — PROGRESS NOTE ADULT - ASSESSMENT
69 Y.O. M with PMH of DM , HTN, HLD presented to the ED with non-healing wound on the left big toe c/w OM.  Cardiology was consulted for pre-surgical clearance prior to Podiatric surgical intervention.    - S/P Surgical LLE debridement soft tissue and bone debridement,  tolerated well  - No acute changes on EKG compared to previous.  - No meaningful evidence of volume overload.    - Pt has no active ischemia, decompensated heart failure, unstable arrythmia, or severe stenotic valvular disease, and has RCRI score of 1.  - SBP controlled 119 - 140s  - Continue Lisinopril 20 mg  - Continue Atorvastatin 20 mg    - Monitor and replete lytes, keep K>4, Mg>2.  - Will continue to follow.    Camille Adame, MS ANP, AGACNP  Nurse Practitioner- Cardiology   Call TEAMS     69 Y.O. M with PMH of DM , HTN, HLD presented to the ED with non-healing wound on the left big toe c/w OM.  Cardiology was consulted for pre-surgical clearance prior to Podiatric surgical intervention.    - S/P Surgical LLE debridement soft tissue and bone debridement,  tolerated well  - No acute changes on EKG compared to previous.  - No meaningful evidence of volume overload.    - SBP controlled 119 - 140s  - Continue Lisinopril 20 mg  - Continue Atorvastatin 20 mg    - Monitor and replete lytes, keep K>4, Mg>2.  - Will continue to follow.    Camille Adame, MS ANP, AGACNP  Nurse Practitioner- Cardiology   Call TEAMS

## 2024-09-15 NOTE — PROGRESS NOTE ADULT - SUBJECTIVE AND OBJECTIVE BOX
Patient is a 69y old  Male who presents with a chief complaint of non-healing left hallux ulcer.        INTERVAL HPI/OVERNIGHT EVENTS: Pt states he feels "good." Pt reports no pain in left hallux. Denies fever, chills, SOB, CP, abd pain, n/v.    MEDICATIONS  (STANDING):  atorvastatin 20 milliGRAM(s) Oral at bedtime  dextrose 5%. 1000 milliLiter(s) (50 mL/Hr) IV Continuous <Continuous>  dextrose 50% Injectable 25 Gram(s) IV Push once  dextrose 50% Injectable 25 Gram(s) IV Push once  dextrose 50% Injectable 12.5 Gram(s) IV Push once  glucagon  Injectable 1 milliGRAM(s) IntraMuscular once  heparin   Injectable 5000 Unit(s) SubCutaneous every 8 hours  influenza  Vaccine (HIGH DOSE) 0.5 milliLiter(s) IntraMuscular once  insulin glargine Injectable (LANTUS) 15 Unit(s) SubCutaneous at bedtime  insulin lispro (ADMELOG) corrective regimen sliding scale   SubCutaneous three times a day before meals  insulin lispro (ADMELOG) corrective regimen sliding scale   SubCutaneous at bedtime  insulin lispro Injectable (ADMELOG) 3 Unit(s) SubCutaneous three times a day before meals  lisinopril 20 milliGRAM(s) Oral every 12 hours  piperacillin/tazobactam IVPB.. 3.375 Gram(s) IV Intermittent every 8 hours  saccharomyces boulardii 250 milliGRAM(s) Oral two times a day    MEDICATIONS  (PRN):  dextrose Oral Gel 15 Gram(s) Oral once PRN Blood Glucose LESS THAN 70 milliGRAM(s)/deciliter  HYDROmorphone  Injectable 0.5 milliGRAM(s) IV Push every 10 minutes PRN Moderate Pain (4 - 6)  ondansetron Injectable 4 milliGRAM(s) IV Push once PRN Nausea and/or Vomiting  oxyCODONE    IR 5 milliGRAM(s) Oral every 4 hours PRN Severe Pain (7 - 10)          Allergies    No Known Allergies    Intolerances        REVIEW OF SYSTEMS:  CONSTITUTIONAL: No fever or chills  HEENT:  No headache, no sore throat  RESPIRATORY: No cough, wheezing, or shortness of breath  CARDIOVASCULAR: No chest pain, palpitations  GASTROINTESTINAL: No abd pain, nausea, vomiting, or diarrhea  GENITOURINARY: No dysuria, frequency, or hematuria  NEUROLOGICAL: no focal weakness or dizziness  MUSCULOSKELETAL: no myalgias ; no foot pain    Vital Signs Last 24 Hrs  T(C): 36.8 (15 Sep 2024 12:25), Max: 36.8 (14 Sep 2024 21:10)  T(F): 98.2 (15 Sep 2024 12:25), Max: 98.3 (14 Sep 2024 21:10)  HR: 63 (15 Sep 2024 12:25) (59 - 73)  BP: 138/78 (15 Sep 2024 12:25) (119/70 - 160/76)  BP(mean): --  RR: 18 (15 Sep 2024 12:25) (18 - 18)  SpO2: 93% (15 Sep 2024 12:25) (93% - 95%)    Parameters below as of 15 Sep 2024 12:25  Patient On (Oxygen Delivery Method): room air        PHYSICAL EXAM:  GENERAL: NAD  HEENT:  anicteric, moist mucous membranes  CHEST/LUNG:  CTA b/l, no rales, wheezes, or rhonchi  HEART:  RRR, S1, S2  ABDOMEN:  BS+, soft, nontender, nondistended  EXTREMITIES: no edema, cyanosis, or calf tenderness; left hallux ulceration - hallux is large in size, with edema, no active drainage at this time  NERVOUS SYSTEM: answers questions and follows commands appropriately    LABS:                                              12.4   8.67  )-----------( 377      ( 15 Sep 2024 09:10 )             38.3     CBC Full  -  ( 15 Sep 2024 09:10 )  WBC Count : 8.67 K/uL  Hemoglobin : 12.4 g/dL  Hematocrit : 38.3 %  Platelet Count - Automated : 377 K/uL  Mean Cell Volume : 83.3 fl  Mean Cell Hemoglobin : 27.0 pg  Mean Cell Hemoglobin Concentration : 32.4 gm/dL  Auto Neutrophil # : x  Auto Lymphocyte # : x  Auto Monocyte # : x  Auto Eosinophil # : x  Auto Basophil # : x  Auto Neutrophil % : x  Auto Lymphocyte % : x  Auto Monocyte % : x  Auto Eosinophil % : x  Auto Basophil % : x    09-15    138  |  101  |  11  ----------------------------<  285<H>  4.7   |  30  |  0.94    Ca    9.3      15 Sep 2024 09:10  Phos  4.2     09-14  Mg     1.9     09-14    TPro  7.2  /  Alb  3.6  /  TBili  0.3  /  DBili  x   /  AST  11<L>  /  ALT  22  /  AlkPhos  79  09-15          Culture - Tissue with Gram Stain (collected 13 Sep 2024 19:30)  Source: Tissue Other, LEFT FOOT  Gram Stain (15 Sep 2024 13:56):    Rare polymorphonuclear leukocytes seen per low power field    No organisms seen per oil power field  Preliminary Report (15 Sep 2024 13:56):    Rare Staphylococcus epidermidis    See previous culture [id number]    Culture - Tissue with Gram Stain (collected 13 Sep 2024 19:30)  Source: Tissue Other, LEFT FOOT  Gram Stain (15 Sep 2024 13:57):    Rare polymorphonuclear leukocytes seen per low power field    No organisms seen per oil power field  Preliminary Report (15 Sep 2024 13:57):    Rare Staphylococcus aureus    Rare Staphylococcus epidermidis    Culture - Tissue with Gram Stain (collected 13 Sep 2024 19:30)  Source: Tissue Other, LEFT FOOT  Gram Stain (15 Sep 2024 13:58):    No polymorphonuclear cells seen per low power field    No organisms seen per oil power field  Preliminary Report (15 Sep 2024 13:58):    Rare Staphylococcus epidermidis    Culture - Other (collected 12 Sep 2024 11:00)  Source: .Other  Final Report (14 Sep 2024 17:54):    Commensal karen consistent with body site    Culture - Fungal, Other (collected 12 Sep 2024 09:20)  Source: .Other Other, L foot  Preliminary Report (14 Sep 2024 23:03):    Culture is being performed. Fungal cultures are held for 4 weeks.    Culture - Blood (collected 11 Sep 2024 20:13)  Source: .Blood Blood-Peripheral    No growth at 3 days    Culture - Other (collected 11 Sep 2024 16:37)  Source: .Other Left Plantar Hallux  Final Report (14 Sep 2024 20:41):    Rare Enterobacter cloacae complex    Few Staphylococcus aureus    Rare Klebsiella pneumoniae  Organism: Enterobacter cloacae complex  Klebsiella pneumoniae  Staphylococcus aureus (14 Sep 2024 20:41)  Organism: Staphylococcus aureus (14 Sep 2024 20:41)  Organism: Klebsiella pneumoniae (14 Sep 2024 20:41)  Organism: Enterobacter cloacae complex (14 Sep 2024 20:41)  Organism: Enterobacter cloacae complex (14 Sep 2024 20:41)      CAPILLARY BLOOD GLUCOSE      POCT Blood Glucose.: 298 mg/dL (15 Sep 2024 17:11)  POCT Blood Glucose.: 260 mg/dL (15 Sep 2024 11:58)  POCT Blood Glucose.: 231 mg/dL (15 Sep 2024 07:44)            RADIOLOGY & ADDITIONAL TESTS:    Personally reviewed.     Consultant(s) Notes Reviewed:  [x] YES  [ ] NO     Patient is a 69y old  Male who presents with a chief complaint of non-healing left hallux ulcer.         INTERVAL HPI/OVERNIGHT EVENTS: Pt states he feels "good." Pt reports no pain in left hallux. Denies fever, chills, SOB, CP, abd pain, n/v.    MEDICATIONS  (STANDING):  atorvastatin 20 milliGRAM(s) Oral at bedtime  dextrose 5%. 1000 milliLiter(s) (50 mL/Hr) IV Continuous <Continuous>  dextrose 50% Injectable 25 Gram(s) IV Push once  dextrose 50% Injectable 25 Gram(s) IV Push once  dextrose 50% Injectable 12.5 Gram(s) IV Push once  glucagon  Injectable 1 milliGRAM(s) IntraMuscular once  heparin   Injectable 5000 Unit(s) SubCutaneous every 8 hours  influenza  Vaccine (HIGH DOSE) 0.5 milliLiter(s) IntraMuscular once  insulin glargine Injectable (LANTUS) 15 Unit(s) SubCutaneous at bedtime  insulin lispro (ADMELOG) corrective regimen sliding scale   SubCutaneous three times a day before meals  insulin lispro (ADMELOG) corrective regimen sliding scale   SubCutaneous at bedtime  insulin lispro Injectable (ADMELOG) 3 Unit(s) SubCutaneous three times a day before meals  lisinopril 20 milliGRAM(s) Oral every 12 hours  piperacillin/tazobactam IVPB.. 3.375 Gram(s) IV Intermittent every 8 hours  saccharomyces boulardii 250 milliGRAM(s) Oral two times a day    MEDICATIONS  (PRN):  dextrose Oral Gel 15 Gram(s) Oral once PRN Blood Glucose LESS THAN 70 milliGRAM(s)/deciliter  HYDROmorphone  Injectable 0.5 milliGRAM(s) IV Push every 10 minutes PRN Moderate Pain (4 - 6)  ondansetron Injectable 4 milliGRAM(s) IV Push once PRN Nausea and/or Vomiting  oxyCODONE    IR 5 milliGRAM(s) Oral every 4 hours PRN Severe Pain (7 - 10)          Allergies    No Known Allergies    Intolerances        REVIEW OF SYSTEMS:  CONSTITUTIONAL: No fever or chills  HEENT:  No headache, no sore throat  RESPIRATORY: No cough, wheezing, or shortness of breath  CARDIOVASCULAR: No chest pain, palpitations  GASTROINTESTINAL: No abd pain, nausea, vomiting, or diarrhea  GENITOURINARY: No dysuria, frequency, or hematuria  NEUROLOGICAL: no focal weakness or dizziness  MUSCULOSKELETAL: no myalgias ; no foot pain    Vital Signs Last 24 Hrs  T(C): 36.8 (15 Sep 2024 12:25), Max: 36.8 (14 Sep 2024 21:10)  T(F): 98.2 (15 Sep 2024 12:25), Max: 98.3 (14 Sep 2024 21:10)  HR: 63 (15 Sep 2024 12:25) (59 - 73)  BP: 138/78 (15 Sep 2024 12:25) (119/70 - 160/76)  BP(mean): --  RR: 18 (15 Sep 2024 12:25) (18 - 18)  SpO2: 93% (15 Sep 2024 12:25) (93% - 95%)    Parameters below as of 15 Sep 2024 12:25  Patient On (Oxygen Delivery Method): room air        PHYSICAL EXAM:  GENERAL: NAD  HEENT:  anicteric, moist mucous membranes  CHEST/LUNG:  CTA b/l, no rales, wheezes, or rhonchi  HEART:  RRR, S1, S2  ABDOMEN:  BS+, soft, nontender, nondistended  EXTREMITIES: no edema, cyanosis, or calf tenderness; left hallux ulceration - hallux is large in size, with edema, no active drainage at this time  NERVOUS SYSTEM: answers questions and follows commands appropriately    LABS:                                              12.4   8.67  )-----------( 377      ( 15 Sep 2024 09:10 )             38.3     CBC Full  -  ( 15 Sep 2024 09:10 )  WBC Count : 8.67 K/uL  Hemoglobin : 12.4 g/dL  Hematocrit : 38.3 %  Platelet Count - Automated : 377 K/uL  Mean Cell Volume : 83.3 fl  Mean Cell Hemoglobin : 27.0 pg  Mean Cell Hemoglobin Concentration : 32.4 gm/dL  Auto Neutrophil # : x  Auto Lymphocyte # : x  Auto Monocyte # : x  Auto Eosinophil # : x  Auto Basophil # : x  Auto Neutrophil % : x  Auto Lymphocyte % : x  Auto Monocyte % : x  Auto Eosinophil % : x  Auto Basophil % : x    09-15    138  |  101  |  11  ----------------------------<  285<H>  4.7   |  30  |  0.94    Ca    9.3      15 Sep 2024 09:10  Phos  4.2     09-14  Mg     1.9     09-14    TPro  7.2  /  Alb  3.6  /  TBili  0.3  /  DBili  x   /  AST  11<L>  /  ALT  22  /  AlkPhos  79  09-15          Culture - Tissue with Gram Stain (collected 13 Sep 2024 19:30)  Source: Tissue Other, LEFT FOOT  Gram Stain (15 Sep 2024 13:56):    Rare polymorphonuclear leukocytes seen per low power field    No organisms seen per oil power field  Preliminary Report (15 Sep 2024 13:56):    Rare Staphylococcus epidermidis    See previous culture [id number]    Culture - Tissue with Gram Stain (collected 13 Sep 2024 19:30)  Source: Tissue Other, LEFT FOOT  Gram Stain (15 Sep 2024 13:57):    Rare polymorphonuclear leukocytes seen per low power field    No organisms seen per oil power field  Preliminary Report (15 Sep 2024 13:57):    Rare Staphylococcus aureus    Rare Staphylococcus epidermidis    Culture - Tissue with Gram Stain (collected 13 Sep 2024 19:30)  Source: Tissue Other, LEFT FOOT  Gram Stain (15 Sep 2024 13:58):    No polymorphonuclear cells seen per low power field    No organisms seen per oil power field  Preliminary Report (15 Sep 2024 13:58):    Rare Staphylococcus epidermidis    Culture - Other (collected 12 Sep 2024 11:00)  Source: .Other  Final Report (14 Sep 2024 17:54):    Commensal karen consistent with body site    Culture - Fungal, Other (collected 12 Sep 2024 09:20)  Source: .Other Other, L foot  Preliminary Report (14 Sep 2024 23:03):    Culture is being performed. Fungal cultures are held for 4 weeks.    Culture - Blood (collected 11 Sep 2024 20:13)  Source: .Blood Blood-Peripheral    No growth at 3 days    Culture - Other (collected 11 Sep 2024 16:37)  Source: .Other Left Plantar Hallux  Final Report (14 Sep 2024 20:41):    Rare Enterobacter cloacae complex    Few Staphylococcus aureus    Rare Klebsiella pneumoniae  Organism: Enterobacter cloacae complex  Klebsiella pneumoniae  Staphylococcus aureus (14 Sep 2024 20:41)  Organism: Staphylococcus aureus (14 Sep 2024 20:41)  Organism: Klebsiella pneumoniae (14 Sep 2024 20:41)  Organism: Enterobacter cloacae complex (14 Sep 2024 20:41)  Organism: Enterobacter cloacae complex (14 Sep 2024 20:41)      CAPILLARY BLOOD GLUCOSE      POCT Blood Glucose.: 298 mg/dL (15 Sep 2024 17:11)  POCT Blood Glucose.: 260 mg/dL (15 Sep 2024 11:58)  POCT Blood Glucose.: 231 mg/dL (15 Sep 2024 07:44)            RADIOLOGY & ADDITIONAL TESTS:    Personally reviewed.     Consultant(s) Notes Reviewed:  [x] YES  [ ] NO

## 2024-09-15 NOTE — PROGRESS NOTE ADULT - ASSESSMENT
68yo M with PMH of Type 2 DM, HTN, HLD p/w non-healing left hallux wound a/w diabetic foot infection with suspected left hallux OM.     # non healing left hallux ulcer with clinical suspicion for OM  #diabetic foot infection   - s/p PO Augmentin , flagyl and subsequently Cefprozil as outpatient but did not heal  - clinically probe to bone per podiatry >> clinical OM  - MRI left foot w/ and w/out Gadolinium showed: "1.  Plantar great toe skin wound with small subjacent fluid collection and marked surrounding soft tissue enhancement concerning for synovitis with an evolving infected collection.  2.  Abnormal marrow signal within the first distal phalanx and the medial margin of the first proximal phalangeal head. Given the overlying skin wound, findings are concerning for osteomyelitis.  - trend ESR and CRP  - treat with zosyn empirically per ID and adjust abx based on cultures  - no leukocytosis and no fever   - f/up BCx NGTD  - f/up operative cultures and pathology  - ID consulted, Dr. Ojeda, recs appreciated  - podiatry (Dr. Becerril), recs appreciated   - vascular consulted (Dr. Lerma), recs appreciated -- may proceed with podiatric procedure  - podiatry discussed options with the pt and pt's wife and decision was to pursue bone biopsy and potential long course of IV abx if OM is confirmed -- pt does not wish to pursue any amputation  - pt had debridement and biopsy of left hallux on 9/13  - cardio (Dr. Witt group), recs appreciated - gave cardiac optimization pre-op      # DM type 2  - hold home oral DM medications   - on home lantus 12-15 U at bedtime  - increase lantus to 15un sq QHS  - start admelog 3un TID pre-meal standing  - c/w mod-dose correctional ISS   - f/u BG , target BG during admission 100-180   - Hgb A1c of 9.3%  - gave education on DM2 management to pt and pt's wife  - diabetes NP consulted for further education    # HLD  - c/w home atorvastatin 20 mg   - f/u lipid panel   - of note, patient was prescribed per his PCP ASA but he does not wish to take     # HTN  - home ramipril 5 mg Q 12 hrs interchanged with lisinopril 20 mg Q 12 hrs due to pharmacy availability     # mild anemia   per chart from PCP seem pt known to have anemia   anemia w/u ( TIBC, iron , ferritin , reticulocyte count , LDH and occult blood )  counseled on colon cancer screening   per [facility] has been seen by GI this month    # thyroid nodules   follows with endocrinology as well as ENT per notes available on [facility] from June 2024  monitor TFTs - outpatient f/up    DVT ppx: heparin SQ  full code

## 2024-09-15 NOTE — PROGRESS NOTE ADULT - SUBJECTIVE AND OBJECTIVE BOX
69y year old Male seen at [facility] 2EAS 206 D1 s/p debridement of soft tissue and bone. Patient relates no overnight events and states that they are doing well at this time.  Denies any fever, chills, nausea, vomiting, chest pain, shortness of breath, or calf pain at this time.    Allergies    No Known Allergies    Intolerances        MEDICATIONS  (STANDING):  atorvastatin 20 milliGRAM(s) Oral at bedtime  dextrose 5%. 1000 milliLiter(s) (50 mL/Hr) IV Continuous <Continuous>  dextrose 50% Injectable 25 Gram(s) IV Push once  dextrose 50% Injectable 25 Gram(s) IV Push once  dextrose 50% Injectable 12.5 Gram(s) IV Push once  glucagon  Injectable 1 milliGRAM(s) IntraMuscular once  heparin   Injectable 5000 Unit(s) SubCutaneous every 8 hours  influenza  Vaccine (HIGH DOSE) 0.5 milliLiter(s) IntraMuscular once  insulin glargine Injectable (LANTUS) 12 Unit(s) SubCutaneous at bedtime  insulin lispro (ADMELOG) corrective regimen sliding scale   SubCutaneous three times a day before meals  insulin lispro (ADMELOG) corrective regimen sliding scale   SubCutaneous at bedtime  lisinopril 20 milliGRAM(s) Oral every 12 hours  piperacillin/tazobactam IVPB.. 3.375 Gram(s) IV Intermittent every 8 hours  saccharomyces boulardii 250 milliGRAM(s) Oral two times a day    MEDICATIONS  (PRN):  dextrose Oral Gel 15 Gram(s) Oral once PRN Blood Glucose LESS THAN 70 milliGRAM(s)/deciliter  HYDROmorphone  Injectable 0.5 milliGRAM(s) IV Push every 10 minutes PRN Moderate Pain (4 - 6)  ondansetron Injectable 4 milliGRAM(s) IV Push once PRN Nausea and/or Vomiting  oxyCODONE    IR 5 milliGRAM(s) Oral every 4 hours PRN Severe Pain (7 - 10)      Vital Signs Last 24 Hrs  T(C): 36.8 (15 Sep 2024 12:25), Max: 36.8 (14 Sep 2024 21:10)  T(F): 98.2 (15 Sep 2024 12:25), Max: 98.3 (14 Sep 2024 21:10)  HR: 63 (15 Sep 2024 12:25) (59 - 73)  BP: 138/78 (15 Sep 2024 12:25) (119/70 - 160/76)  BP(mean): --  RR: 18 (15 Sep 2024 12:25) (18 - 18)  SpO2: 93% (15 Sep 2024 12:25) (93% - 95%)    Parameters below as of 15 Sep 2024 12:25  Patient On (Oxygen Delivery Method): room air        PHYSICAL EXAM:  Vascular: DP & PT palpable bilaterally, Capillary refill 3 seconds, Digital hair present bilaterally  Neurological: Light touch sensation diminished to digits   Musculoskeletal: 5/5 strength in all quadrants bilaterally, AJ & STJ ROM intact  Dermatological: Incision site of the left foot noted with intact sutures, no dehiscence, mild josemanuel-incision erythema, no proximal streaking, no fluctuance, no malodor, no signs of infection at this time.                          12.4   8.67  )-----------( 377      ( 15 Sep 2024 09:10 )             38.3       09-15    138  |  101  |  11  ----------------------------<  285<H>  4.7   |  30  |  0.94    Ca    9.3      15 Sep 2024 09:10  Phos  4.2     09-14  Mg     1.9     09-14    TPro  7.2  /  Alb  3.6  /  TBili  0.3  /  DBili  x   /  AST  11<L>  /  ALT  22  /  AlkPhos  79  09-15            Culture - Tissue with Gram Stain (collected 13 Sep 2024 19:30)  Source: Tissue Other, LEFT FOOT  Gram Stain (15 Sep 2024 13:56):    Rare polymorphonuclear leukocytes seen per low power field    No organisms seen per oil power field  Preliminary Report (15 Sep 2024 13:56):    Rare Staphylococcus epidermidis    See previous culture [id number]    Culture - Tissue with Gram Stain (collected 13 Sep 2024 19:30)  Source: Tissue Other, LEFT FOOT  Gram Stain (15 Sep 2024 13:57):    Rare polymorphonuclear leukocytes seen per low power field    No organisms seen per oil power field  Preliminary Report (15 Sep 2024 13:57):    Rare Staphylococcus aureus    Rare Staphylococcus epidermidis    Culture - Tissue with Gram Stain (collected 13 Sep 2024 19:30)  Source: Tissue Other, LEFT FOOT  Gram Stain (15 Sep 2024 13:58):    No polymorphonuclear cells seen per low power field    No organisms seen per oil power field  Preliminary Report (15 Sep 2024 13:58):    Rare Staphylococcus epidermidis        Imaging: ----------   69y year old Male [facility] 2EAS 206 D1 s/p debridement of soft tissue and bone. Patient relates no overnight events and states that they are doing well at this time.  Denies any fever, chills, nausea, vomiting, chest pain, shortness of breath, or calf pain at this time.    Allergies    No Known Allergies    Intolerances        MEDICATIONS  (STANDING):  atorvastatin 20 milliGRAM(s) Oral at bedtime  dextrose 5%. 1000 milliLiter(s) (50 mL/Hr) IV Continuous <Continuous>  dextrose 50% Injectable 25 Gram(s) IV Push once  dextrose 50% Injectable 25 Gram(s) IV Push once  dextrose 50% Injectable 12.5 Gram(s) IV Push once  glucagon  Injectable 1 milliGRAM(s) IntraMuscular once  heparin   Injectable 5000 Unit(s) SubCutaneous every 8 hours  influenza  Vaccine (HIGH DOSE) 0.5 milliLiter(s) IntraMuscular once  insulin glargine Injectable (LANTUS) 12 Unit(s) SubCutaneous at bedtime  insulin lispro (ADMELOG) corrective regimen sliding scale   SubCutaneous three times a day before meals  insulin lispro (ADMELOG) corrective regimen sliding scale   SubCutaneous at bedtime  lisinopril 20 milliGRAM(s) Oral every 12 hours  piperacillin/tazobactam IVPB.. 3.375 Gram(s) IV Intermittent every 8 hours  saccharomyces boulardii 250 milliGRAM(s) Oral two times a day    MEDICATIONS  (PRN):  dextrose Oral Gel 15 Gram(s) Oral once PRN Blood Glucose LESS THAN 70 milliGRAM(s)/deciliter  HYDROmorphone  Injectable 0.5 milliGRAM(s) IV Push every 10 minutes PRN Moderate Pain (4 - 6)  ondansetron Injectable 4 milliGRAM(s) IV Push once PRN Nausea and/or Vomiting  oxyCODONE    IR 5 milliGRAM(s) Oral every 4 hours PRN Severe Pain (7 - 10)      Vital Signs Last 24 Hrs  T(C): 36.8 (15 Sep 2024 12:25), Max: 36.8 (14 Sep 2024 21:10)  T(F): 98.2 (15 Sep 2024 12:25), Max: 98.3 (14 Sep 2024 21:10)  HR: 63 (15 Sep 2024 12:25) (59 - 73)  BP: 138/78 (15 Sep 2024 12:25) (119/70 - 160/76)  BP(mean): --  RR: 18 (15 Sep 2024 12:25) (18 - 18)  SpO2: 93% (15 Sep 2024 12:25) (93% - 95%)    Parameters below as of 15 Sep 2024 12:25  Patient On (Oxygen Delivery Method): room air        PHYSICAL EXAM:  Vascular: DP & PT palpable bilaterally, Capillary refill 3 seconds, Digital hair present bilaterally  Neurological: Light touch sensation diminished to digits   Musculoskeletal: 5/5 strength in all quadrants bilaterally, AJ & STJ ROM intact  Dermatological: Incision site of the left foot noted with intact sutures, no dehiscence, mild josemanuel-incision erythema, no proximal streaking, no fluctuance, no malodor, no signs of infection at this time. Plantar left hallux wound noted to be down to bone measuring 0.7cm x 0.5cm down to bone                           12.4   8.67  )-----------( 377      ( 15 Sep 2024 09:10 )             38.3       09-15    138  |  101  |  11  ----------------------------<  285<H>  4.7   |  30  |  0.94    Ca    9.3      15 Sep 2024 09:10  Phos  4.2     09-14  Mg     1.9     09-14    TPro  7.2  /  Alb  3.6  /  TBili  0.3  /  DBili  x   /  AST  11<L>  /  ALT  22  /  AlkPhos  79  09-15            Culture - Tissue with Gram Stain (collected 13 Sep 2024 19:30)  Source: Tissue Other, LEFT FOOT  Gram Stain (15 Sep 2024 13:56):    Rare polymorphonuclear leukocytes seen per low power field    No organisms seen per oil power field  Preliminary Report (15 Sep 2024 13:56):    Rare Staphylococcus epidermidis    See previous culture [id number]    Culture - Tissue with Gram Stain (collected 13 Sep 2024 19:30)  Source: Tissue Other, LEFT FOOT  Gram Stain (15 Sep 2024 13:57):    Rare polymorphonuclear leukocytes seen per low power field    No organisms seen per oil power field  Preliminary Report (15 Sep 2024 13:57):    Rare Staphylococcus aureus    Rare Staphylococcus epidermidis    Culture - Tissue with Gram Stain (collected 13 Sep 2024 19:30)  Source: Tissue Other, LEFT FOOT  Gram Stain (15 Sep 2024 13:58):    No polymorphonuclear cells seen per low power field    No organisms seen per oil power field  Preliminary Report (15 Sep 2024 13:58):    Rare Staphylococcus epidermidis        Imaging: ----------  Post op - bone biopsy

## 2024-09-16 LAB
-  CLINDAMYCIN: SIGNIFICANT CHANGE UP
-  ERYTHROMYCIN: SIGNIFICANT CHANGE UP
-  GENTAMICIN: SIGNIFICANT CHANGE UP
-  OXACILLIN: SIGNIFICANT CHANGE UP
-  PENICILLIN: SIGNIFICANT CHANGE UP
-  RIFAMPIN: SIGNIFICANT CHANGE UP
-  TETRACYCLINE: SIGNIFICANT CHANGE UP
-  TRIMETHOPRIM/SULFAMETHOXAZOLE: SIGNIFICANT CHANGE UP
-  VANCOMYCIN: SIGNIFICANT CHANGE UP
ANION GAP SERPL CALC-SCNC: 7 MMOL/L — SIGNIFICANT CHANGE UP (ref 5–17)
BUN SERPL-MCNC: 11 MG/DL — SIGNIFICANT CHANGE UP (ref 7–23)
CALCIUM SERPL-MCNC: 9.6 MG/DL — SIGNIFICANT CHANGE UP (ref 8.5–10.1)
CHLORIDE SERPL-SCNC: 101 MMOL/L — SIGNIFICANT CHANGE UP (ref 96–108)
CO2 SERPL-SCNC: 30 MMOL/L — SIGNIFICANT CHANGE UP (ref 22–31)
CREAT SERPL-MCNC: 1 MG/DL — SIGNIFICANT CHANGE UP (ref 0.5–1.3)
EGFR: 81 ML/MIN/1.73M2 — SIGNIFICANT CHANGE UP
GLUCOSE SERPL-MCNC: 234 MG/DL — HIGH (ref 70–99)
HCT VFR BLD CALC: 41.1 % — SIGNIFICANT CHANGE UP (ref 39–50)
HGB BLD-MCNC: 12.7 G/DL — LOW (ref 13–17)
MCHC RBC-ENTMCNC: 25.7 PG — LOW (ref 27–34)
MCHC RBC-ENTMCNC: 30.9 GM/DL — LOW (ref 32–36)
MCV RBC AUTO: 83.2 FL — SIGNIFICANT CHANGE UP (ref 80–100)
METHOD TYPE: SIGNIFICANT CHANGE UP
NRBC # BLD: 0 /100 WBCS — SIGNIFICANT CHANGE UP (ref 0–0)
PLATELET # BLD AUTO: 417 K/UL — HIGH (ref 150–400)
POTASSIUM SERPL-MCNC: 4.7 MMOL/L — SIGNIFICANT CHANGE UP (ref 3.5–5.3)
POTASSIUM SERPL-SCNC: 4.7 MMOL/L — SIGNIFICANT CHANGE UP (ref 3.5–5.3)
RBC # BLD: 4.94 M/UL — SIGNIFICANT CHANGE UP (ref 4.2–5.8)
RBC # FLD: 14.7 % — HIGH (ref 10.3–14.5)
SODIUM SERPL-SCNC: 138 MMOL/L — SIGNIFICANT CHANGE UP (ref 135–145)
WBC # BLD: 9.39 K/UL — SIGNIFICANT CHANGE UP (ref 3.8–10.5)
WBC # FLD AUTO: 9.39 K/UL — SIGNIFICANT CHANGE UP (ref 3.8–10.5)

## 2024-09-16 PROCEDURE — 99232 SBSQ HOSP IP/OBS MODERATE 35: CPT

## 2024-09-16 PROCEDURE — 99233 SBSQ HOSP IP/OBS HIGH 50: CPT

## 2024-09-16 RX ORDER — ACETAMINOPHEN 325 MG/1
650 TABLET ORAL EVERY 6 HOURS
Refills: 0 | Status: DISCONTINUED | OUTPATIENT
Start: 2024-09-16 | End: 2024-09-17

## 2024-09-16 RX ORDER — INSULIN GLARGINE 100 [IU]/ML
18 INJECTION, SOLUTION SUBCUTANEOUS AT BEDTIME
Refills: 0 | Status: DISCONTINUED | OUTPATIENT
Start: 2024-09-16 | End: 2024-09-17

## 2024-09-16 RX ADMIN — Medication 5000 UNIT(S): at 13:11

## 2024-09-16 RX ADMIN — Medication 8: at 12:30

## 2024-09-16 RX ADMIN — Medication 100 MILLIGRAM(S): at 19:05

## 2024-09-16 RX ADMIN — RDII 250 MG CAPSULE 250 MILLIGRAM(S): at 17:19

## 2024-09-16 RX ADMIN — PIPERACILLIN SODIUM AND TAZOBACTAM SODIUM 25 GRAM(S): 3; .375 INJECTION, POWDER, FOR SOLUTION INTRAVENOUS at 06:00

## 2024-09-16 RX ADMIN — Medication 4: at 07:59

## 2024-09-16 RX ADMIN — Medication 6 UNIT(S): at 17:18

## 2024-09-16 RX ADMIN — Medication 5000 UNIT(S): at 22:20

## 2024-09-16 RX ADMIN — Medication 5000 UNIT(S): at 06:01

## 2024-09-16 RX ADMIN — PIPERACILLIN SODIUM AND TAZOBACTAM SODIUM 25 GRAM(S): 3; .375 INJECTION, POWDER, FOR SOLUTION INTRAVENOUS at 13:10

## 2024-09-16 RX ADMIN — LISINOPRIL 20 MILLIGRAM(S): 10 TABLET ORAL at 17:19

## 2024-09-16 RX ADMIN — Medication 3 UNIT(S): at 08:00

## 2024-09-16 RX ADMIN — Medication 6 UNIT(S): at 12:31

## 2024-09-16 RX ADMIN — LISINOPRIL 20 MILLIGRAM(S): 10 TABLET ORAL at 05:59

## 2024-09-16 RX ADMIN — Medication 4: at 17:18

## 2024-09-16 RX ADMIN — Medication 2: at 22:20

## 2024-09-16 RX ADMIN — Medication 20 MILLIGRAM(S): at 22:20

## 2024-09-16 RX ADMIN — RDII 250 MG CAPSULE 250 MILLIGRAM(S): at 06:05

## 2024-09-16 RX ADMIN — ACETAMINOPHEN 650 MILLIGRAM(S): 325 TABLET ORAL at 12:42

## 2024-09-16 RX ADMIN — INSULIN GLARGINE 18 UNIT(S): 100 INJECTION, SOLUTION SUBCUTANEOUS at 22:21

## 2024-09-16 RX ADMIN — ACETAMINOPHEN 650 MILLIGRAM(S): 325 TABLET ORAL at 11:33

## 2024-09-16 NOTE — PROGRESS NOTE ADULT - SUBJECTIVE AND OBJECTIVE BOX
[facility]  INFECTIOUS DISEASES   [address] [place] [address]  Tel: [phone]     Fax: [phone]  ========================================================  MD John Elias Michelle, MD Shah, Kaushal, MD Sunjit, Jaspal, MD Sehrish Shahid, MD   ========================================================    [id number]  STANLEY GONSALEZ     Follow up: Feels better, less pain and pressure. No fever.     PAST MEDICAL & SURGICAL HISTORY:  HTN  DM2    Social Hx: No current smoking, EtOH or drugs     FAMILY HISTORY:  Noncontributory     Allergies  No Known Allergies    MEDICATIONS  (STANDING):  atorvastatin 20 milliGRAM(s) Oral at bedtime  cefepime   IVPB 2000 milliGRAM(s) IV Intermittent every 12 hours  dextrose 5%. 1000 milliLiter(s) (100 mL/Hr) IV Continuous <Continuous>  dextrose 5%. 1000 milliLiter(s) (50 mL/Hr) IV Continuous <Continuous>  dextrose 50% Injectable 12.5 Gram(s) IV Push once  dextrose 50% Injectable 25 Gram(s) IV Push once  dextrose 50% Injectable 25 Gram(s) IV Push once  glucagon  Injectable 1 milliGRAM(s) IntraMuscular once  heparin   Injectable 5000 Unit(s) SubCutaneous every 8 hours  insulin glargine Injectable (LANTUS) 8 Unit(s) SubCutaneous at bedtime  insulin lispro (ADMELOG) corrective regimen sliding scale   SubCutaneous three times a day before meals  lisinopril 20 milliGRAM(s) Oral every 12 hours  vancomycin  IVPB 1000 milliGRAM(s) IV Intermittent every 12 hours    MEDICATIONS  (PRN):  dextrose Oral Gel 15 Gram(s) Oral once PRN Blood Glucose LESS THAN 70 milliGRAM(s)/deciliter     REVIEW OF SYSTEMS:  CONSTITUTIONAL:  No Fever or chills  HEENT:  No diplopia or blurred vision.  No sore throat or runny nose.  CARDIOVASCULAR:  No chest pain   RESPIRATORY:  No cough, shortness of breath, PND or orthopnea.  GASTROINTESTINAL:  No nausea, vomiting or diarrhea.  GENITOURINARY:  No dysuria, frequency or urgency. No Blood in urine  MUSCULOSKELETAL:  no joint aches, no muscle pain  SKIN:  foot ulcer     Physical Exam:  Vital Signs Last 24 Hrs  T(C): 36.3 (16 Sep 2024 11:45), Max: 36.4 (15 Sep 2024 20:57)  T(F): 97.4 (16 Sep 2024 11:45), Max: 97.6 (15 Sep 2024 20:57)  HR: 67 (16 Sep 2024 11:45) (63 - 67)  BP: 119/69 (16 Sep 2024 11:45) (102/62 - 123/73)  BP(mean): --  RR: 20 (16 Sep 2024 11:45) (18 - 20)  SpO2: 95% (16 Sep 2024 11:45) (91% - 95%)  Parameters below as of 16 Sep 2024 11:45  Patient On (Oxygen Delivery Method): room air  GEN: NAD  HEENT: normocephalic and atraumatic. EOMI. PERRL.    NECK: Supple.  No lymphadenopathy   LUNGS: Clear to auscultation.  HEART: Regular rate and rhythm   ABDOMEN: Soft, nontender, and nondistended.    EXTREMITIES: Without edema.  NEUROLOGIC: grossly intact.  PSYCHIATRIC: Appropriate affect .  SKIN: left foot with swelling in 1st toe with an open ulcer   in plantar side with purulent discharge       Labs:                        12.7   9.39  )-----------( 417      ( 16 Sep 2024 06:55 )             41.1     09-16    138  |  101  |  11  ----------------------------<  234<H>  4.7   |  30  |  1.00    Ca    9.6      16 Sep 2024 06:55    TPro  7.2  /  Alb  3.6  /  TBili  0.3  /  DBili  x   /  AST  11<L>  /  ALT  22  /  AlkPhos  79  09-15      Culture - Tissue with Gram Stain (collected 09-13-24 @ 19:30)  Source: Tissue Other, LEFT FOOT  Gram Stain (09-15-24 @ 13:56):    Rare polymorphonuclear leukocytes seen per low power field    No organisms seen per oil power field  Preliminary Report (09-15-24 @ 13:56):    Rare Staphylococcus epidermidis    See previous culture [id number]    Culture - Tissue with Gram Stain (collected 09-13-24 @ 19:30)  Source: Tissue Other, LEFT FOOT  Gram Stain (09-15-24 @ 13:57):    Rare polymorphonuclear leukocytes seen per low power field    No organisms seen per oil power field  Preliminary Report (09-15-24 @ 13:57):    Rare Staphylococcus aureus    Rare Staphylococcus epidermidis  Organism: Staphylococcus aureus  Staphylococcus epidermidis (09-16-24 @ 10:15)  Organism: Staphylococcus epidermidis (09-16-24 @ 10:15)    Sensitivities:      Method Type: TAYLER      -  Clindamycin: S <=0.25      -  Erythromycin: R >4      -  Gentamicin: S <=1 Should not be used as monotherapy      -  Oxacillin: R >2      -  Penicillin: R >8      -  Rifampin: S <=1 Should not be used as monotherapy      -  Tetracycline: S <=1      -  Trimethoprim/Sulfamethoxazole: R >2/38      -  Vancomycin: S 2  Organism: Staphylococcus aureus (09-16-24 @ 10:15)    Sensitivities:      Method Type: TAYLER      -  Clindamycin: S <=0.25      -  Erythromycin: S <=0.25      -  Gentamicin: S <=1 Should not be used as monotherapy      -  Oxacillin: S <=0.25 Oxacillin predicts susceptibility for dicloxacillin, methicillin, and nafcillin      -  Penicillin: R >8      -  Rifampin: S <=1 Should not be used as monotherapy      -  Tetracycline: S <=1      -  Trimethoprim/Sulfamethoxazole: S <=0.5/9.5      -  Vancomycin: S 2    Culture - Tissue with Gram Stain (collected 09-13-24 @ 19:30)  Source: Tissue Other, LEFT FOOT  Gram Stain (09-15-24 @ 13:58):    No polymorphonuclear cells seen per low power field    No organisms seen per oil power field  Preliminary Report (09-16-24 @ 10:15):    Rare Staphylococcus epidermidis    Rare Corynebacterium tuberculostearicum "Susceptibilities not performed"  Organism: Staphylococcus epidermidis (09-16-24 @ 10:14)  Organism: Staphylococcus epidermidis (09-16-24 @ 10:14)    Sensitivities:      Method Type: TAYLER      -  Clindamycin: S <=0.25      -  Erythromycin: S <=0.25      -  Gentamicin: S <=1 Should not be used as monotherapy      -  Oxacillin: R >2      -  Penicillin: R >8      -  Rifampin: S <=1 Should not be used as monotherapy      -  Tetracycline: S <=1      -  Trimethoprim/Sulfamethoxazole: R >2/38      -  Vancomycin: S 2    Culture - Other (collected 09-12-24 @ 11:00)  Source: .Other  Final Report (09-14-24 @ 17:54):    Commensal karen consistent with body site    Culture - Fungal, Other (collected 09-12-24 @ 09:20)  Source: .Other Other, L foot  Preliminary Report (09-14-24 @ 23:03):    Culture is being performed. Fungal cultures are held for 4 weeks.    Culture - Blood (collected 09-11-24 @ 20:13)  Source: .Blood Blood-Peripheral  Preliminary Report (09-16-24 @ 02:01):    No growth at 4 days    Culture - Other (collected 09-11-24 @ 16:37)  Source: .Other Left Plantar Hallux  Final Report (09-14-24 @ 20:41):    Rare Enterobacter cloacae complex    Few Staphylococcus aureus    Rare Klebsiella pneumoniae  Organism: Enterobacter cloacae complex  Klebsiella pneumoniae  Staphylococcus aureus (09-14-24 @ 20:41)  Organism: Staphylococcus aureus (09-14-24 @ 20:41)    Sensitivities:      Method Type: TAYLER      -  Clindamycin: S <=0.25      -  Erythromycin: S <=0.25      -  Gentamicin: S <=1 Should not be used as monotherapy      -  Oxacillin: S <=0.25 Oxacillin predicts susceptibility for dicloxacillin, methicillin, and nafcillin      -  Penicillin: R 8      -  Rifampin: S <=1 Should not be used as monotherapy      -  Tetracycline: S <=1      -  Trimethoprim/Sulfamethoxazole: S <=0.5/9.5      -  Vancomycin: S 1  Organism: Klebsiella pneumoniae (09-14-24 @ 20:41)    Sensitivities:      Method Type: TAYLER      -  Amoxicillin/Clavulanic Acid: R >16/8      -  Ampicillin: R >16 These ampicillin results predict results for amoxicillin      -  Ampicillin/Sulbactam: R >16/8      -  Aztreonam: S <=4      -  Cefazolin: R >16      -  Cefepime: S <=2      -  Cefoxitin: S <=8      -  Ceftriaxone: S <=1      -  Ciprofloxacin: S <=0.25      -  Ertapenem: S <=0.5      -  Gentamicin: S <=2      -  Imipenem: S <=1      -  Levofloxacin: S <=0.5      -  Meropenem: S <=1      -  Piperacillin/Tazobactam: R >64      -  Tobramycin: S <=2      -  Trimethoprim/Sulfamethoxazole: S <=0.5/9.5  Organism: Enterobacter cloacae complex (09-14-24 @ 20:41)    Sensitivities:      Method Type: CarbaR      -  Resistance Gene to Carbapenem: Nondet  Organism: Enterobacter cloacae complex (09-14-24 @ 20:41)    Sensitivities:      Method Type: TAYLER      -  Amoxicillin/Clavulanic Acid: R >16/8      -  Ampicillin: R >16 These ampicillin results predict results for amoxicillin      -  Ampicillin/Sulbactam: R >16/8      -  Aztreonam: I 16      -  Cefazolin: R >16      -  Cefepime: R >16      -  Cefoxitin: R >16      -  Ceftazidime/Avibactam: S <=4      -  Ceftolozane/tazobactam: I 4      -  Ceftriaxone: R >32 Enterobacter cloacae, Klebsiella aerogenes, and Citrobacter freundii may develop resistance during prolonged therapy.      -  Ciprofloxacin: S <=0.25      -  Ertapenem: I 1      -  Gentamicin: S <=2      -  Imipenem: S <=1      -  Levofloxacin: S <=0.5      -  Meropenem: S <=1      -  Piperacillin/Tazobactam: R >64 Treatment with Pipercillin/Tazobactam is not recommended in severe infections casued by Klebsiella aerogenes, Enterobacter cloacae complex, and Citrobacter freundii complex.      -  Tobramycin: S <=2      -  Trimethoprim/Sulfamethoxazole: S <=0.5/9.5    WBC Count: 9.39 K/uL (09-16-24 @ 06:55)  WBC Count: 8.67 K/uL (09-15-24 @ 09:10)  WBC Count: 10.45 K/uL (09-14-24 @ 07:56)  WBC Count: 7.17 K/uL (09-13-24 @ 09:35)  WBC Count: 8.52 K/uL (09-12-24 @ 08:46)  WBC Count: 9.03 K/uL (09-11-24 @ 20:10)    Creatinine: 1.00 mg/dL (09-16-24 @ 06:55)  Creatinine: 0.94 mg/dL (09-15-24 @ 09:10)  Creatinine: 0.78 mg/dL (09-14-24 @ 07:56)  Creatinine: 0.88 mg/dL (09-13-24 @ 09:35)  Creatinine: 0.76 mg/dL (09-12-24 @ 08:46)  Creatinine: 0.75 mg/dL (09-11-24 @ 20:10)    C-Reactive Protein: <3 mg/L (09-12-24 @ 08:46)  C-Reactive Protein: <3 mg/L (09-11-24 @ 20:10)    Ferritin: 38 ng/mL (09-12-24 @ 08:46)    Sedimentation Rate, Erythrocyte: 18 mm/hr (09-12-24 @ 08:46)  Sedimentation Rate, Erythrocyte: 20 mm/hr (09-11-24 @ 20:10)    All imaging and other data have been reviewed.  < from: MR Foot w/wo IV Cont, Left (09.12.24 @ 11:49) >  IMPRESSION:  1.  Plantar great toe skin wound with small subjacent fluid collection   and marked surrounding soft tissue enhancement concerning for synovitis   with an evolving infected collection.  2.  Abnormal marrow signal within the first distal phalanx and the medial   margin of the first proximal phalangeal head. Given the overlying skin   wound, findings are concerning for osteomyelitis.    Assessment and Plan:   70yo man with PMH of HTN and DM2 was admitted with left 1st toe infection. He was recently on vacation in [facility] and while he was there he stepped on a pine needle that was 2nd week of August on the 1st L toe   according to him and it was getting red and swollen , his wife is a physician ( neurologist ) and his family member in [place] as well is physician and he was prescribed antibiotics, per podiatry note Augmentin and flagyl.  then [place] came to [place] on [place]1 he was still having the L toe ulcer and was not improving so he went to his PCP who prescribed for him cefprozil.   he was seen by podiatry that recommended wound care center and on eval was found to have probing down to bone so was advised to come to ED.    # Left 1st toe infection r/o OM  # R/o fungal infection? Sporotrichosis     - Blood cultures NGTD  - Fungal culture NGTD  - Wound culture after tissue biopsy with MSSA, MRSE and corynebacterium  - Most likely the only real pathogen is MSSA., other ones normal skin karen  - Will monitor WBC and Tmx both normal  - Podiatry follow up noted   - MRI result noted, has OM of distal phalanx of left 1st toe  - Can switch to ceftriaxone 2mg daily to cover MSSA and some GNRs, more convenient dosing  - PICC for 6 weeks treatment.     Will follow.    Rhett Ojeda MD  Division of Infectious Diseases   Please call ID service at [phone] with any question.    50 minutes spent on total encounter assessing patient, examination, chart review, counseling and coordinating care by the attending physician/nurse/care manager.   [facility]  INFECTIOUS DISEASES   [address] [place] [address]  Tel: [phone]     Fax: [phone]  ========================================================  MD John Elias Michelle, MD Shah, Kaushal, MD Sunjit, Jaspal, MD Sehrish Shahid, MD   ========================================================    [id number]  STANLEY GONSALEZ     Follow up: Feels better, less pain and pressure. No fever.     PAST MEDICAL & SURGICAL HISTORY:  HTN  DM2    Social Hx: No current smoking, EtOH or drugs     FAMILY HISTORY:  Noncontributory     Allergies  No Known Allergies    MEDICATIONS  (STANDING):  atorvastatin 20 milliGRAM(s) Oral at bedtime  cefepime   IVPB 2000 milliGRAM(s) IV Intermittent every 12 hours  dextrose 5%. 1000 milliLiter(s) (100 mL/Hr) IV Continuous <Continuous>  dextrose 5%. 1000 milliLiter(s) (50 mL/Hr) IV Continuous <Continuous>  dextrose 50% Injectable 12.5 Gram(s) IV Push once  dextrose 50% Injectable 25 Gram(s) IV Push once  dextrose 50% Injectable 25 Gram(s) IV Push once  glucagon  Injectable 1 milliGRAM(s) IntraMuscular once  heparin   Injectable 5000 Unit(s) SubCutaneous every 8 hours  insulin glargine Injectable (LANTUS) 8 Unit(s) SubCutaneous at bedtime  insulin lispro (ADMELOG) corrective regimen sliding scale   SubCutaneous three times a day before meals  lisinopril 20 milliGRAM(s) Oral every 12 hours  vancomycin  IVPB 1000 milliGRAM(s) IV Intermittent every 12 hours    MEDICATIONS  (PRN):  dextrose Oral Gel 15 Gram(s) Oral once PRN Blood Glucose LESS THAN 70 milliGRAM(s)/deciliter     REVIEW OF SYSTEMS:  CONSTITUTIONAL:  No Fever or chills  HEENT:  No diplopia or blurred vision.  No sore throat or runny nose.  CARDIOVASCULAR:  No chest pain   RESPIRATORY:  No cough, shortness of breath, PND or orthopnea.  GASTROINTESTINAL:  No nausea, vomiting or diarrhea.  GENITOURINARY:  No dysuria, frequency or urgency. No Blood in urine  MUSCULOSKELETAL:  no joint aches, no muscle pain  SKIN:  foot ulcer     Physical Exam:  Vital Signs Last 24 Hrs  T(C): 36.3 (16 Sep 2024 11:45), Max: 36.4 (15 Sep 2024 20:57)  T(F): 97.4 (16 Sep 2024 11:45), Max: 97.6 (15 Sep 2024 20:57)  HR: 67 (16 Sep 2024 11:45) (63 - 67)  BP: 119/69 (16 Sep 2024 11:45) (102/62 - 123/73)  BP(mean): --  RR: 20 (16 Sep 2024 11:45) (18 - 20)  SpO2: 95% (16 Sep 2024 11:45) (91% - 95%)  Parameters below as of 16 Sep 2024 11:45  Patient On (Oxygen Delivery Method): room air  GEN: NAD  HEENT: normocephalic and atraumatic. EOMI. PERRL.    NECK: Supple.  No lymphadenopathy   LUNGS: Clear to auscultation.  HEART: Regular rate and rhythm   ABDOMEN: Soft, nontender, and nondistended.    EXTREMITIES: Without edema.  NEUROLOGIC: grossly intact.  PSYCHIATRIC: Appropriate affect .  SKIN: left foot with swelling in 1st toe with an open ulcer   in plantar side with purulent discharge       Labs:                        12.7   9.39  )-----------( 417      ( 16 Sep 2024 06:55 )             41.1     09-16    138  |  101  |  11  ----------------------------<  234<H>  4.7   |  30  |  1.00    Ca    9.6      16 Sep 2024 06:55    TPro  7.2  /  Alb  3.6  /  TBili  0.3  /  DBili  x   /  AST  11<L>  /  ALT  22  /  AlkPhos  79  09-15      Culture - Tissue with Gram Stain (collected 09-13-24 @ 19:30)  Source: Tissue Other, LEFT FOOT  Gram Stain (09-15-24 @ 13:56):    Rare polymorphonuclear leukocytes seen per low power field    No organisms seen per oil power field  Preliminary Report (09-15-24 @ 13:56):    Rare Staphylococcus epidermidis    See previous culture [id number]    Culture - Tissue with Gram Stain (collected 09-13-24 @ 19:30)  Source: Tissue Other, LEFT FOOT  Gram Stain (09-15-24 @ 13:57):    Rare polymorphonuclear leukocytes seen per low power field    No organisms seen per oil power field  Preliminary Report (09-15-24 @ 13:57):    Rare Staphylococcus aureus    Rare Staphylococcus epidermidis  Organism: Staphylococcus aureus  Staphylococcus epidermidis (09-16-24 @ 10:15)  Organism: Staphylococcus epidermidis (09-16-24 @ 10:15)    Sensitivities:      Method Type: TAYLER      -  Clindamycin: S <=0.25      -  Erythromycin: R >4      -  Gentamicin: S <=1 Should not be used as monotherapy      -  Oxacillin: R >2      -  Penicillin: R >8      -  Rifampin: S <=1 Should not be used as monotherapy      -  Tetracycline: S <=1      -  Trimethoprim/Sulfamethoxazole: R >2/38      -  Vancomycin: S 2  Organism: Staphylococcus aureus (09-16-24 @ 10:15)    Sensitivities:      Method Type: TAYLER      -  Clindamycin: S <=0.25      -  Erythromycin: S <=0.25      -  Gentamicin: S <=1 Should not be used as monotherapy      -  Oxacillin: S <=0.25 Oxacillin predicts susceptibility for dicloxacillin, methicillin, and nafcillin      -  Penicillin: R >8      -  Rifampin: S <=1 Should not be used as monotherapy      -  Tetracycline: S <=1      -  Trimethoprim/Sulfamethoxazole: S <=0.5/9.5      -  Vancomycin: S 2    Culture - Tissue with Gram Stain (collected 09-13-24 @ 19:30)  Source: Tissue Other, LEFT FOOT  Gram Stain (09-15-24 @ 13:58):    No polymorphonuclear cells seen per low power field    No organisms seen per oil power field  Preliminary Report (09-16-24 @ 10:15):    Rare Staphylococcus epidermidis    Rare Corynebacterium tuberculostearicum "Susceptibilities not performed"  Organism: Staphylococcus epidermidis (09-16-24 @ 10:14)  Organism: Staphylococcus epidermidis (09-16-24 @ 10:14)    Sensitivities:      Method Type: TAYLER      -  Clindamycin: S <=0.25      -  Erythromycin: S <=0.25      -  Gentamicin: S <=1 Should not be used as monotherapy      -  Oxacillin: R >2      -  Penicillin: R >8      -  Rifampin: S <=1 Should not be used as monotherapy      -  Tetracycline: S <=1      -  Trimethoprim/Sulfamethoxazole: R >2/38      -  Vancomycin: S 2    Culture - Other (collected 09-12-24 @ 11:00)  Source: .Other  Final Report (09-14-24 @ 17:54):    Commensal karen consistent with body site    Culture - Fungal, Other (collected 09-12-24 @ 09:20)  Source: .Other Other, L foot  Preliminary Report (09-14-24 @ 23:03):    Culture is being performed. Fungal cultures are held for 4 weeks.    Culture - Blood (collected 09-11-24 @ 20:13)  Source: .Blood Blood-Peripheral  Preliminary Report (09-16-24 @ 02:01):    No growth at 4 days    Culture - Other (collected 09-11-24 @ 16:37)  Source: .Other Left Plantar Hallux  Final Report (09-14-24 @ 20:41):    Rare Enterobacter cloacae complex    Few Staphylococcus aureus    Rare Klebsiella pneumoniae  Organism: Enterobacter cloacae complex  Klebsiella pneumoniae  Staphylococcus aureus (09-14-24 @ 20:41)  Organism: Staphylococcus aureus (09-14-24 @ 20:41)    Sensitivities:      Method Type: TAYLER      -  Clindamycin: S <=0.25      -  Erythromycin: S <=0.25      -  Gentamicin: S <=1 Should not be used as monotherapy      -  Oxacillin: S <=0.25 Oxacillin predicts susceptibility for dicloxacillin, methicillin, and nafcillin      -  Penicillin: R 8      -  Rifampin: S <=1 Should not be used as monotherapy      -  Tetracycline: S <=1      -  Trimethoprim/Sulfamethoxazole: S <=0.5/9.5      -  Vancomycin: S 1  Organism: Klebsiella pneumoniae (09-14-24 @ 20:41)    Sensitivities:      Method Type: TAYLER      -  Amoxicillin/Clavulanic Acid: R >16/8      -  Ampicillin: R >16 These ampicillin results predict results for amoxicillin      -  Ampicillin/Sulbactam: R >16/8      -  Aztreonam: S <=4      -  Cefazolin: R >16      -  Cefepime: S <=2      -  Cefoxitin: S <=8      -  Ceftriaxone: S <=1      -  Ciprofloxacin: S <=0.25      -  Ertapenem: S <=0.5      -  Gentamicin: S <=2      -  Imipenem: S <=1      -  Levofloxacin: S <=0.5      -  Meropenem: S <=1      -  Piperacillin/Tazobactam: R >64      -  Tobramycin: S <=2      -  Trimethoprim/Sulfamethoxazole: S <=0.5/9.5  Organism: Enterobacter cloacae complex (09-14-24 @ 20:41)    Sensitivities:      Method Type: CarbaR      -  Resistance Gene to Carbapenem: Nondet  Organism: Enterobacter cloacae complex (09-14-24 @ 20:41)    Sensitivities:      Method Type: TAYLER      -  Amoxicillin/Clavulanic Acid: R >16/8      -  Ampicillin: R >16 These ampicillin results predict results for amoxicillin      -  Ampicillin/Sulbactam: R >16/8      -  Aztreonam: I 16      -  Cefazolin: R >16      -  Cefepime: R >16      -  Cefoxitin: R >16      -  Ceftazidime/Avibactam: S <=4      -  Ceftolozane/tazobactam: I 4      -  Ceftriaxone: R >32 Enterobacter cloacae, Klebsiella aerogenes, and Citrobacter freundii may develop resistance during prolonged therapy.      -  Ciprofloxacin: S <=0.25      -  Ertapenem: I 1      -  Gentamicin: S <=2      -  Imipenem: S <=1      -  Levofloxacin: S <=0.5      -  Meropenem: S <=1      -  Piperacillin/Tazobactam: R >64 Treatment with Pipercillin/Tazobactam is not recommended in severe infections casued by Klebsiella aerogenes, Enterobacter cloacae complex, and Citrobacter freundii complex.      -  Tobramycin: S <=2      -  Trimethoprim/Sulfamethoxazole: S <=0.5/9.5    WBC Count: 9.39 K/uL (09-16-24 @ 06:55)  WBC Count: 8.67 K/uL (09-15-24 @ 09:10)  WBC Count: 10.45 K/uL (09-14-24 @ 07:56)  WBC Count: 7.17 K/uL (09-13-24 @ 09:35)  WBC Count: 8.52 K/uL (09-12-24 @ 08:46)  WBC Count: 9.03 K/uL (09-11-24 @ 20:10)    Creatinine: 1.00 mg/dL (09-16-24 @ 06:55)  Creatinine: 0.94 mg/dL (09-15-24 @ 09:10)  Creatinine: 0.78 mg/dL (09-14-24 @ 07:56)  Creatinine: 0.88 mg/dL (09-13-24 @ 09:35)  Creatinine: 0.76 mg/dL (09-12-24 @ 08:46)  Creatinine: 0.75 mg/dL (09-11-24 @ 20:10)    C-Reactive Protein: <3 mg/L (09-12-24 @ 08:46)  C-Reactive Protein: <3 mg/L (09-11-24 @ 20:10)    Ferritin: 38 ng/mL (09-12-24 @ 08:46)    Sedimentation Rate, Erythrocyte: 18 mm/hr (09-12-24 @ 08:46)  Sedimentation Rate, Erythrocyte: 20 mm/hr (09-11-24 @ 20:10)    All imaging and other data have been reviewed.  < from: MR Foot w/wo IV Cont, Left (09.12.24 @ 11:49) >  IMPRESSION:  1.  Plantar great toe skin wound with small subjacent fluid collection   and marked surrounding soft tissue enhancement concerning for synovitis   with an evolving infected collection.  2.  Abnormal marrow signal within the first distal phalanx and the medial   margin of the first proximal phalangeal head. Given the overlying skin   wound, findings are concerning for osteomyelitis.    Assessment and Plan:   70yo man with PMH of HTN and DM2 was admitted with left 1st toe infection. He was recently on vacation in [facility] and while he was there he stepped on a pine needle that was 2nd week of August on the 1st L toe   according to him and it was getting red and swollen , his wife is a physician ( neurologist ) and his family member in [place] as well is physician and he was prescribed antibiotics, per podiatry note Augmentin and flagyl.  then after he came to [place] on [place]1 he was still having the L toe ulcer and was not improving so he went to his PCP who prescribed for him cefprozil.   he was seen by podiatry that recommended wound care center and on eval was found to have probing down to bone so was advised to come to ED.    # Left 1st toe infection r/o OM  # R/o fungal infection? Sporotrichosis     - Blood cultures NGTD  - Fungal culture NGTD  - Wound culture after tissue biopsy with MSSA, MRSE and corynebacterium  - Most likely the only real pathogen is MSSA., other ones normal skin karen  - Will monitor WBC and Tmx both normal  - Podiatry follow up noted   - MRI result noted, has OM of distal phalanx of left 1st toe  - Can switch to ceftriaxone 2mg daily to cover MSSA and some GNRs, more convenient dosing  - PICC for 6 weeks treatment.   - Last day would be 10/23  - CBC, CMP and CRP weekly    Will follow PRN.    Rhett Ojeda MD  Division of Infectious Diseases   Please call ID service at [phone] with any question.    50 minutes spent on total encounter assessing patient, examination, chart review, counseling and coordinating care by the attending physician/nurse/care manager.

## 2024-09-16 NOTE — PROGRESS NOTE ADULT - PROBLEM SELECTOR PROBLEM 3
Non-pressure chronic ulcer of other part of left foot with necrosis of bone

## 2024-09-16 NOTE — PROGRESS NOTE ADULT - SUBJECTIVE AND OBJECTIVE BOX
[facility] -- Douglas Hernandes, Mj Witt Savella, Goodger, Cohen: Office # [phone]    Follow Up:  Cardiac Optimization S/P Debridement foot wound, HTN     Subjective/Observations: Patient awake, alert, resting in bed. Denies chest pain, palpitations and dizziness. Denies any difficulty breathing. No orthopnea and PND. Tolerating room air. Left foot DSD intact.     REVIEW OF SYSTEMS: All other review of systems are negative unless indicated above    PAST MEDICAL & SURGICAL HISTORY:      MEDICATIONS  (STANDING):  atorvastatin 20 milliGRAM(s) Oral at bedtime  dextrose 5%. 1000 milliLiter(s) (50 mL/Hr) IV Continuous <Continuous>  dextrose 50% Injectable 25 Gram(s) IV Push once  dextrose 50% Injectable 12.5 Gram(s) IV Push once  dextrose 50% Injectable 25 Gram(s) IV Push once  glucagon  Injectable 1 milliGRAM(s) IntraMuscular once  heparin   Injectable 5000 Unit(s) SubCutaneous every 8 hours  influenza  Vaccine (HIGH DOSE) 0.5 milliLiter(s) IntraMuscular once  insulin glargine Injectable (LANTUS) 15 Unit(s) SubCutaneous at bedtime  insulin lispro (ADMELOG) corrective regimen sliding scale   SubCutaneous at bedtime  insulin lispro (ADMELOG) corrective regimen sliding scale   SubCutaneous three times a day before meals  insulin lispro Injectable (ADMELOG) 3 Unit(s) SubCutaneous three times a day before meals  lisinopril 20 milliGRAM(s) Oral every 12 hours  piperacillin/tazobactam IVPB.. 3.375 Gram(s) IV Intermittent every 8 hours  saccharomyces boulardii 250 milliGRAM(s) Oral two times a day    MEDICATIONS  (PRN):  dextrose Oral Gel 15 Gram(s) Oral once PRN Blood Glucose LESS THAN 70 milliGRAM(s)/deciliter  HYDROmorphone  Injectable 0.5 milliGRAM(s) IV Push every 10 minutes PRN Moderate Pain (4 - 6)  ondansetron Injectable 4 milliGRAM(s) IV Push once PRN Nausea and/or Vomiting  oxyCODONE    IR 5 milliGRAM(s) Oral every 4 hours PRN Severe Pain (7 - 10)    Allergies  No Known Allergies    Vital Signs Last 24 Hrs  T(C): 36.3 (16 Sep 2024 05:21), Max: 36.8 (15 Sep 2024 12:25)  T(F): 97.4 (16 Sep 2024 05:21), Max: 98.2 (15 Sep 2024 12:25)  HR: 66 (16 Sep 2024 05:21) (63 - 66)  BP: 123/73 (16 Sep 2024 05:21) (102/62 - 138/78)  BP(mean): --  RR: 18 (16 Sep 2024 05:21) (18 - 18)  SpO2: 93% (16 Sep 2024 05:21) (91% - 93%)    Parameters below as of 16 Sep 2024 05:21  Patient On (Oxygen Delivery Method): room air      I&O's Summary        TELE: Not on telemetry   PHYSICAL EXAM:  Constitutional: NAD, awake and alert  HEENT: Moist Mucous Membranes, Anicteric  Pulmonary: Non-labored, breath sounds are clear bilaterally, No wheezing, rales or rhonchi  Cardiovascular: Regular, S1 and S2, No murmurs, No rubs, gallops or clicks  Gastrointestinal:  soft, nontender, nondistended   Lymph: No peripheral edema. No lymphadenopathy.   Skin: No visible rashes Left foot DSD intact.   Psych:  Mood & affect appropriate      LABS: All Labs Reviewed:                        12.7   9.39  )-----------( 417      ( 16 Sep 2024 06:55 )             41.1                         12.4   8.67  )-----------( 377      ( 15 Sep 2024 09:10 )             38.3                         11.9   10.45 )-----------( 384      ( 14 Sep 2024 07:56 )             39.1     16 Sep 2024 06:55    138    |  101    |  11     ----------------------------<  234    4.7     |  30     |  1.00   15 Sep 2024 09:10    138    |  101    |  11     ----------------------------<  285    4.7     |  30     |  0.94   14 Sep 2024 07:56    140    |  105    |  14     ----------------------------<  286    4.4     |  32     |  0.78     Ca    9.6        16 Sep 2024 06:55  Ca    9.3        15 Sep 2024 09:10  Ca    9.3        14 Sep 2024 07:56  Phos  4.2       14 Sep 2024 07:56  Phos  3.4       13 Sep 2024 09:35  Mg     1.9       14 Sep 2024 07:56  Mg     1.9       13 Sep 2024 09:35    TPro  7.2    /  Alb  3.6    /  TBili  0.3    /  DBili  x      /  AST  11     /  ALT  22     /  AlkPhos  79     15 Sep 2024 09:10   LIVER FUNCTIONS - ( 15 Sep 2024 09:10 )  Alb: 3.6 g/dL / Pro: 7.2 g/dL / ALK PHOS: 79 U/L / ALT: 22 U/L / AST: 11 U/L / GGT: x           Cholesterol: 99 mg/dL (09-12-24 @ 08:46)  HDL Cholesterol: 32 mg/dL (09-12-24 @ 08:46)  Triglycerides, Serum: 67 mg/dL (09-12-24 @ 08:46)    12 Lead ECG:   Ventricular Rate 59 BPM    Atrial Rate 59 BPM    P-R Interval 174 ms    QRS Duration 88 ms    Q-T Interval 418 ms    QTC Calculation(Bazett) 413 ms    P Axis -14 degrees    R Axis 61 degrees    T Axis 69 degrees    Diagnosis Line Sinus bradycardia  Septal infarct , age undetermined  Confirmed by YANET WITT ([id number]) on 9/12/2024 8:49:17 AM (09-12-24 @ 07:13)

## 2024-09-16 NOTE — PROGRESS NOTE ADULT - ASSESSMENT
70yo M with PMH of Type 2 DM, HTN, HLD p/w non-healing left hallux wound a/w diabetic foot infection with suspected left hallux OM.     #non healing left hallux ulcer with clinical suspicion for OM  #diabetic foot infection   - s/p PO Augmentin, flagyl and subsequently Cefprozil as outpatient but did not heal  - clinically probe to bone per podiatry >> clinical OM  - MRI left foot w/ and w/out Gadolinium showed: "1.  Plantar great toe skin wound with small subjacent fluid collection and marked surrounding soft tissue enhancement concerning for synovitis with an evolving infected collection.  2.  Abnormal marrow signal within the first distal phalanx and the medial margin of the first proximal phalangeal head. Given the overlying skin wound, findings are concerning for osteomyelitis.  - trend ESR and CRP  - treated with zosyn initially, now with further WCx data, transitioned to rocephin per ID recs as suspect the MSSA or Klebsiella from cultures are the culprit organisms causing the infection, while the staph epidermidis is likely skin contaminant  - no leukocytosis and no fever   - f/up BCx NGTD  - f/up pathology  - will place PICC line tomorrow and CM working on outpatient IV abx  - ID consulted, Dr. Ojeda, recs appreciated  - podiatry (Dr. Becerril), recs appreciated   - vascular consulted (Dr. Lerma), recs appreciated -- may proceed with podiatric procedure  - podiatry discussed options with the pt and pt's wife and decision was to pursue bone biopsy and potential long course of IV abx if OM is confirmed -- pt does not wish to pursue any amputation  - pt had debridement and biopsy of left hallux on 9/13  - cardio (Dr. [facility]), recs appreciated - gave cardiac optimization pre-op    # DM type 2  - hold home oral DM medications   - on home lantus 12-15 U at bedtime  - increase lantus to 18un sq QHS  - increase admelog to 6un TID pre-meal standing  - c/w mod-dose correctional ISS   - f/u BG , target BG during admission 100-180   - Hgb A1c of 9.3%  - gave education on DM2 management to pt and pt's wife  - diabetes RAJANI Sims, recs appreciated      # HLD  - c/w home atorvastatin 20 mg   - f/u lipid panel   - of note, patient was prescribed per his PCP ASA but he does not wish to take     # HTN  - home ramipril 5 mg Q 12 hrs interchanged with lisinopril 20 mg Q 12 hrs due to pharmacy availability     # mild anemia   per chart from PCP seem pt known to have anemia   iron levels evaluated  counseled on colon cancer screening   per Bari HOPKINS has been seen by GI this month    # thyroid nodules   [facility]in[facility] as well [facility]  monitor TFTs - outpatient f/up    DVT ppx: heparin SQ  full code

## 2024-09-16 NOTE — PROGRESS NOTE ADULT - TIME BILLING
Pt seen + examined on 9/13. Note written by attending, see above.  Time spent: 42min. Time spent discussing/coordinating care with consultants, CM/SW team, and counseling the patient and pt's wife on medical condition -- suspected left hallux OM, surgical vs medical management, bone biopsy with podiatry and need for 6-wk course of IV abx via PICC if OM is confirmed on pathology as pt choosing abx route of treatment, diabetes management.
Pt seen + examined on 9/16. Note written by attending, see above.  Time spent: 51min. Time spent discussing/coordinating care with consultants, CM/SW team, and counseling the patient and pt's wife on medical condition -- suspected left hallux OM, surgical vs medical management, bone biopsy with podiatry and need for 6-wk course of IV abx via PICC if OM is confirmed on pathology as pt choosing abx route of treatment, diabetes management, insulin titration.
Pt seen + examined on 9/15. Note written by attending, see above.  Time spent: 51min. Time spent discussing/coordinating care with consultants, CM/SW team, and counseling the patient and pt's wife on medical condition -- suspected left hallux OM, surgical vs medical management, bone biopsy with podiatry and need for 6-wk course of IV abx via PICC if OM is confirmed on pathology as pt choosing abx route of treatment, diabetes management, insulin titration.
Pt seen + examined on 9/12. Note written by attending, see above.  Time spent: 51min. Time spent discussing/coordinating care with consultants, CM/SW team, and counseling the patient and pt's wife on medical condition -- suspected left hallux OM, surgical vs medical management, bone biopsy with podiatry and need for 6-wk course of IV abx via PICC if OM is confirmed on pathology as pt choosing abx route of treatment, diabetes management.

## 2024-09-16 NOTE — PROGRESS NOTE ADULT - ASSESSMENT
69 Y.O. M with PMH of DM , HTN, HLD presented to the ED with non-healing wound on the left big toe c/w OM.  Cardiology was consulted for pre-surgical clearance prior to Podiatric surgical intervention.    - S/p Surgical LLE debridement soft tissue and bone debridement  - Tolerated procedure well, cardiac status optimal post operatively     - BP stable and controlled   - Continue Lisinopril 20 mg    - No acute changes on EKG compared to previous.  - No evidence of any active ischemia   - Continue Atorvastatin 20 mg    - No evidence of any meaningful volume overload     - Monitor and replete lytes, keep K>4, Mg>2.  - Will continue to follow.    Vlad Quezada, MS FNP, AGACNP  Nurse Practitioner- Cardiology   Please call on TEAMS

## 2024-09-16 NOTE — PROGRESS NOTE ADULT - SUBJECTIVE AND OBJECTIVE BOX
Patient is a 69y old  Male who presents with a chief complaint of non-healing left hallux ulcer.         INTERVAL HPI/OVERNIGHT EVENTS: Pt states he feels "alright." Pt reports no pain in left hallux currently. Denies fever, chills, SOB, CP, abd pain, n/v.    MEDICATIONS  (STANDING):  atorvastatin 20 milliGRAM(s) Oral at bedtime  cefTRIAXone   IVPB 2000 milliGRAM(s) IV Intermittent every 24 hours  dextrose 5%. 1000 milliLiter(s) (50 mL/Hr) IV Continuous <Continuous>  dextrose 50% Injectable 25 Gram(s) IV Push once  dextrose 50% Injectable 12.5 Gram(s) IV Push once  dextrose 50% Injectable 25 Gram(s) IV Push once  glucagon  Injectable 1 milliGRAM(s) IntraMuscular once  heparin   Injectable 5000 Unit(s) SubCutaneous every 8 hours  influenza  Vaccine (HIGH DOSE) 0.5 milliLiter(s) IntraMuscular once  insulin glargine Injectable (LANTUS) 18 Unit(s) SubCutaneous at bedtime  insulin lispro (ADMELOG) corrective regimen sliding scale   SubCutaneous three times a day before meals  insulin lispro (ADMELOG) corrective regimen sliding scale   SubCutaneous at bedtime  insulin lispro Injectable (ADMELOG) 6 Unit(s) SubCutaneous three times a day before meals  lisinopril 20 milliGRAM(s) Oral every 12 hours  saccharomyces boulardii 250 milliGRAM(s) Oral two times a day    MEDICATIONS  (PRN):  acetaminophen     Tablet .. 650 milliGRAM(s) Oral every 6 hours PRN Mild Pain (1 - 3)  dextrose Oral Gel 15 Gram(s) Oral once PRN Blood Glucose LESS THAN 70 milliGRAM(s)/deciliter  HYDROmorphone  Injectable 0.5 milliGRAM(s) IV Push every 10 minutes PRN Moderate Pain (4 - 6)  ondansetron Injectable 4 milliGRAM(s) IV Push once PRN Nausea and/or Vomiting  oxyCODONE    IR 5 milliGRAM(s) Oral every 4 hours PRN Severe Pain (7 - 10)      Allergies    No Known Allergies    Intolerances        REVIEW OF SYSTEMS:  CONSTITUTIONAL: No fever or chills  HEENT:  No headache, no sore throat  RESPIRATORY: No cough, wheezing, or shortness of breath  CARDIOVASCULAR: No chest pain, palpitations  GASTROINTESTINAL: No abd pain, nausea, vomiting, or diarrhea  GENITOURINARY: No dysuria, frequency, or hematuria  NEUROLOGICAL: no focal weakness or dizziness  MUSCULOSKELETAL: no myalgias ; no foot pain    Vital Signs Last 24 Hrs  T(C): 36.3 (16 Sep 2024 11:45), Max: 36.4 (15 Sep 2024 20:57)  T(F): 97.4 (16 Sep 2024 11:45), Max: 97.6 (15 Sep 2024 20:57)  HR: 67 (16 Sep 2024 11:45) (63 - 67)  BP: 119/69 (16 Sep 2024 11:45) (102/62 - 123/73)  BP(mean): --  RR: 20 (16 Sep 2024 11:45) (18 - 20)  SpO2: 95% (16 Sep 2024 11:45) (91% - 95%)    Parameters below as of 16 Sep 2024 11:45  Patient On (Oxygen Delivery Method): room air      PHYSICAL EXAM:  GENERAL: NAD  HEENT:  anicteric, moist mucous membranes  CHEST/LUNG:  CTA b/l, no rales, wheezes, or rhonchi  HEART:  RRR, S1, S2  ABDOMEN:  BS+, soft, nontender, nondistended  EXTREMITIES: no edema, cyanosis, or calf tenderness; left hallux ulceration - hallux is large in size, with edema, no active drainage at this time  NERVOUS SYSTEM: answers questions and follows commands appropriately    LABS:                                              12.7   9.39  )-----------( 417      ( 16 Sep 2024 06:55 )             41.1     CBC Full  -  ( 16 Sep 2024 06:55 )  WBC Count : 9.39 K/uL  Hemoglobin : 12.7 g/dL  Hematocrit : 41.1 %  Platelet Count - Automated : 417 K/uL  Mean Cell Volume : 83.2 fl  Mean Cell Hemoglobin : 25.7 pg  Mean Cell Hemoglobin Concentration : 30.9 gm/dL  Auto Neutrophil # : x  Auto Lymphocyte # : x  Auto Monocyte # : x  Auto Eosinophil # : x  Auto Basophil # : x  Auto Neutrophil % : x  Auto Lymphocyte % : x  Auto Monocyte % : x  Auto Eosinophil % : x  Auto Basophil % : x    09-16    138  |  101  |  11  ----------------------------<  234<H>  4.7   |  30  |  1.00    Ca    9.6      16 Sep 2024 06:55    TPro  7.2  /  Alb  3.6  /  TBili  0.3  /  DBili  x   /  AST  11<L>  /  ALT  22  /  AlkPhos  79  09-15          Culture - Tissue with Gram Stain (collected 13 Sep 2024 19:30)  Source: Tissue Other, LEFT FOOT  Gram Stain (15 Sep 2024 13:56):    Rare polymorphonuclear leukocytes seen per low power field    No organisms seen per oil power field  Preliminary Report (15 Sep 2024 13:56):    Rare Staphylococcus epidermidis    See previous culture [id number]    Culture - Tissue with Gram Stain (collected 13 Sep 2024 19:30)  Source: Tissue Other, LEFT FOOT  Gram Stain (15 Sep 2024 13:57):    Rare polymorphonuclear leukocytes seen per low power field    No organisms seen per oil power field  Preliminary Report (15 Sep 2024 13:57):    Rare Staphylococcus aureus    Rare Staphylococcus epidermidis  Organism: Staphylococcus aureus  Staphylococcus epidermidis (16 Sep 2024 10:15)  Organism: Staphylococcus epidermidis (16 Sep 2024 10:15)  Organism: Staphylococcus aureus (16 Sep 2024 10:15)    Culture - Tissue with Gram Stain (collected 13 Sep 2024 19:30)  Source: Tissue Other, LEFT FOOT  Gram Stain (15 Sep 2024 13:58):    No polymorphonuclear cells seen per low power field    No organisms seen per oil power field  Preliminary Report (16 Sep 2024 10:15):    Rare Staphylococcus epidermidis    Rare Corynebacterium tuberculostearicum "Susceptibilities not performed"  Organism: Staphylococcus epidermidis (16 Sep 2024 10:14)  Organism: Staphylococcus epidermidis (16 Sep 2024 10:14)    Culture - Other (collected 12 Sep 2024 11:00)  Source: .Other  Final Report (14 Sep 2024 17:54):    Commensal karen consistent with body site    Culture - Fungal, Other (collected 12 Sep 2024 09:20)  Source: .Other Other, L foot  Preliminary Report (14 Sep 2024 23:03):    Culture is being performed. Fungal cultures are held for 4 weeks.    Culture - Blood (collected 11 Sep 2024 20:13)  Source: .Blood Blood-Peripheral  Preliminary Report (16 Sep 2024 02:01):    No growth at 4 days    Culture - Other (collected 11 Sep 2024 16:37)  Source: .Other Left Plantar Hallux  Final Report (14 Sep 2024 20:41):    Rare Enterobacter cloacae complex    Few Staphylococcus aureus    Rare Klebsiella pneumoniae  Organism: Enterobacter cloacae complex  Klebsiella pneumoniae  Staphylococcus aureus (14 Sep 2024 20:41)  Organism: Staphylococcus aureus (14 Sep 2024 20:41)  Organism: Klebsiella pneumoniae (14 Sep 2024 20:41)  Organism: Enterobacter cloacae complex (14 Sep 2024 20:41)  Organism: Enterobacter cloacae complex (14 Sep 2024 20:41)      CAPILLARY BLOOD GLUCOSE      POCT Blood Glucose.: 232 mg/dL (16 Sep 2024 17:00)  POCT Blood Glucose.: 325 mg/dL (16 Sep 2024 11:54)  POCT Blood Glucose.: 216 mg/dL (16 Sep 2024 07:44)  POCT Blood Glucose.: 277 mg/dL (15 Sep 2024 21:36)      RADIOLOGY & ADDITIONAL TESTS:    Personally reviewed.     Consultant(s) Notes Reviewed:  [x] YES  [ ] NO Patient is a 69y old  Male who presents with a chief complaint of non-healing left hallux ulcer.          INTERVAL HPI/OVERNIGHT EVENTS: Pt states he feels "alright." Pt reports no pain in left hallux currently. Denies fever, chills, SOB, CP, abd pain, n/v.    MEDICATIONS  (STANDING):  atorvastatin 20 milliGRAM(s) Oral at bedtime  cefTRIAXone   IVPB 2000 milliGRAM(s) IV Intermittent every 24 hours  dextrose 5%. 1000 milliLiter(s) (50 mL/Hr) IV Continuous <Continuous>  dextrose 50% Injectable 25 Gram(s) IV Push once  dextrose 50% Injectable 12.5 Gram(s) IV Push once  dextrose 50% Injectable 25 Gram(s) IV Push once  glucagon  Injectable 1 milliGRAM(s) IntraMuscular once  heparin   Injectable 5000 Unit(s) SubCutaneous every 8 hours  influenza  Vaccine (HIGH DOSE) 0.5 milliLiter(s) IntraMuscular once  insulin glargine Injectable (LANTUS) 18 Unit(s) SubCutaneous at bedtime  insulin lispro (ADMELOG) corrective regimen sliding scale   SubCutaneous three times a day before meals  insulin lispro (ADMELOG) corrective regimen sliding scale   SubCutaneous at bedtime  insulin lispro Injectable (ADMELOG) 6 Unit(s) SubCutaneous three times a day before meals  lisinopril 20 milliGRAM(s) Oral every 12 hours  saccharomyces boulardii 250 milliGRAM(s) Oral two times a day    MEDICATIONS  (PRN):  acetaminophen     Tablet .. 650 milliGRAM(s) Oral every 6 hours PRN Mild Pain (1 - 3)  dextrose Oral Gel 15 Gram(s) Oral once PRN Blood Glucose LESS THAN 70 milliGRAM(s)/deciliter  HYDROmorphone  Injectable 0.5 milliGRAM(s) IV Push every 10 minutes PRN Moderate Pain (4 - 6)  ondansetron Injectable 4 milliGRAM(s) IV Push once PRN Nausea and/or Vomiting  oxyCODONE    IR 5 milliGRAM(s) Oral every 4 hours PRN Severe Pain (7 - 10)      Allergies    No Known Allergies    Intolerances        REVIEW OF SYSTEMS:  CONSTITUTIONAL: No fever or chills  HEENT:  No headache, no sore throat  RESPIRATORY: No cough, wheezing, or shortness of breath  CARDIOVASCULAR: No chest pain, palpitations  GASTROINTESTINAL: No abd pain, nausea, vomiting, or diarrhea  GENITOURINARY: No dysuria, frequency, or hematuria  NEUROLOGICAL: no focal weakness or dizziness  MUSCULOSKELETAL: no myalgias ; no foot pain    Vital Signs Last 24 Hrs  T(C): 36.3 (16 Sep 2024 11:45), Max: 36.4 (15 Sep 2024 20:57)  T(F): 97.4 (16 Sep 2024 11:45), Max: 97.6 (15 Sep 2024 20:57)  HR: 67 (16 Sep 2024 11:45) (63 - 67)  BP: 119/69 (16 Sep 2024 11:45) (102/62 - 123/73)  BP(mean): --  RR: 20 (16 Sep 2024 11:45) (18 - 20)  SpO2: 95% (16 Sep 2024 11:45) (91% - 95%)    Parameters below as of 16 Sep 2024 11:45  Patient On (Oxygen Delivery Method): room air      PHYSICAL EXAM:  GENERAL: NAD  HEENT:  anicteric, moist mucous membranes  CHEST/LUNG:  CTA b/l, no rales, wheezes, or rhonchi  HEART:  RRR, S1, S2  ABDOMEN:  BS+, soft, nontender, nondistended  EXTREMITIES: no edema, cyanosis, or calf tenderness; left hallux ulceration - hallux is large in size, with edema, no active drainage at this time  NERVOUS SYSTEM: answers questions and follows commands appropriately    LABS:                                              12.7   9.39  )-----------( 417      ( 16 Sep 2024 06:55 )             41.1     CBC Full  -  ( 16 Sep 2024 06:55 )  WBC Count : 9.39 K/uL  Hemoglobin : 12.7 g/dL  Hematocrit : 41.1 %  Platelet Count - Automated : 417 K/uL  Mean Cell Volume : 83.2 fl  Mean Cell Hemoglobin : 25.7 pg  Mean Cell Hemoglobin Concentration : 30.9 gm/dL  Auto Neutrophil # : x  Auto Lymphocyte # : x  Auto Monocyte # : x  Auto Eosinophil # : x  Auto Basophil # : x  Auto Neutrophil % : x  Auto Lymphocyte % : x  Auto Monocyte % : x  Auto Eosinophil % : x  Auto Basophil % : x    09-16    138  |  101  |  11  ----------------------------<  234<H>  4.7   |  30  |  1.00    Ca    9.6      16 Sep 2024 06:55    TPro  7.2  /  Alb  3.6  /  TBili  0.3  /  DBili  x   /  AST  11<L>  /  ALT  22  /  AlkPhos  79  09-15          Culture - Tissue with Gram Stain (collected 13 Sep 2024 19:30)  Source: Tissue Other, LEFT FOOT  Gram Stain (15 Sep 2024 13:56):    Rare polymorphonuclear leukocytes seen per low power field    No organisms seen per oil power field  Preliminary Report (15 Sep 2024 13:56):    Rare Staphylococcus epidermidis    See previous culture [id number]    Culture - Tissue with Gram Stain (collected 13 Sep 2024 19:30)  Source: Tissue Other, LEFT FOOT  Gram Stain (15 Sep 2024 13:57):    Rare polymorphonuclear leukocytes seen per low power field    No organisms seen per oil power field  Preliminary Report (15 Sep 2024 13:57):    Rare Staphylococcus aureus    Rare Staphylococcus epidermidis  Organism: Staphylococcus aureus  Staphylococcus epidermidis (16 Sep 2024 10:15)  Organism: Staphylococcus epidermidis (16 Sep 2024 10:15)  Organism: Staphylococcus aureus (16 Sep 2024 10:15)    Culture - Tissue with Gram Stain (collected 13 Sep 2024 19:30)  Source: Tissue Other, LEFT FOOT  Gram Stain (15 Sep 2024 13:58):    No polymorphonuclear cells seen per low power field    No organisms seen per oil power field  Preliminary Report (16 Sep 2024 10:15):    Rare Staphylococcus epidermidis    Rare Corynebacterium tuberculostearicum "Susceptibilities not performed"  Organism: Staphylococcus epidermidis (16 Sep 2024 10:14)  Organism: Staphylococcus epidermidis (16 Sep 2024 10:14)    Culture - Other (collected 12 Sep 2024 11:00)  Source: .Other  Final Report (14 Sep 2024 17:54):    Commensal karen consistent with body site    Culture - Fungal, Other (collected 12 Sep 2024 09:20)  Source: .Other Other, L foot  Preliminary Report (14 Sep 2024 23:03):    Culture is being performed. Fungal cultures are held for 4 weeks.    Culture - Blood (collected 11 Sep 2024 20:13)  Source: .Blood Blood-Peripheral  Preliminary Report (16 Sep 2024 02:01):    No growth at 4 days    Culture - Other (collected 11 Sep 2024 16:37)  Source: .Other Left Plantar Hallux  Final Report (14 Sep 2024 20:41):    Rare Enterobacter cloacae complex    Few Staphylococcus aureus    Rare Klebsiella pneumoniae  Organism: Enterobacter cloacae complex  Klebsiella pneumoniae  Staphylococcus aureus (14 Sep 2024 20:41)  Organism: Staphylococcus aureus (14 Sep 2024 20:41)  Organism: Klebsiella pneumoniae (14 Sep 2024 20:41)  Organism: Enterobacter cloacae complex (14 Sep 2024 20:41)  Organism: Enterobacter cloacae complex (14 Sep 2024 20:41)      CAPILLARY BLOOD GLUCOSE      POCT Blood Glucose.: 232 mg/dL (16 Sep 2024 17:00)  POCT Blood Glucose.: 325 mg/dL (16 Sep 2024 11:54)  POCT Blood Glucose.: 216 mg/dL (16 Sep 2024 07:44)  POCT Blood Glucose.: 277 mg/dL (15 Sep 2024 21:36)      RADIOLOGY & ADDITIONAL TESTS:    Personally reviewed.     Consultant(s) Notes Reviewed:  [x] YES  [ ] NO

## 2024-09-16 NOTE — CASE MANAGEMENT PROGRESS NOTE - NSCMPROGRESSNOTE_GEN_ALL_CORE
CM met with pt and spouse at bedside. Pt gave consent to speak with caregiver / spouse at bedside. Pt chose [facility] [phone] and [facility] [phone]. Pt / spouse agreeable to copay $100.00 per week for IV Ceftriaxone 2 gm Q24H. Pt for PICC placement in am. CM to remain available.

## 2024-09-16 NOTE — PROGRESS NOTE ADULT - PROBLEM SELECTOR PLAN 1
Patient's wound with edema, erythema and drainage, high suspicion of OM.  X-rays demonstrate osseous destruction to the medial hallux consistent with possible OM.  MRI pending.  Wound culture taken today.  Recommend vascular consult.  Continue IV abx per ID recs.   Patient will possibly need surgical intervention   Will c/w local wound care and offloading.  Podiatry will continue to follow while in-house.  Plan to be discussed with attending.
PAtient seen and evaluated  s/p debridement of soft tissue and bone left foot  Dressing changed today  WB as tolerated  Wound Care Instructions:  -Keep dressing clean, dry, and intact to the left foot  -Apply adaptic, gauze, cheryle, change every other day   -Patient wb as tolerated in surgical shoe   -Monitor for any signs of infection including redness, swelling in the leg above the bandage, nausea/vomiting/fever/chills/chest pain/shortness of breath, if any are present patient must report to the emergency department immediately  -Patient is to follow up with Dr. Nielsen/Dr. Camara/ Dr. Becerril  within 5 days after discharge at [facility] [place]. Please call to make an appointment [phone]
Patient seen and evaluated.  s/p debridement of soft tissue and bone left foot.  Dressings changed today.  WB as tolerated.  Continue abx per ID recs.       Wound Care Instructions:  -Keep dressing clean, dry, and intact to the left foot  -Apply adaptic, gauze, cheryle, change every other day   -Patient wb as tolerated in surgical shoe   -Monitor for any signs of infection including redness, swelling in the leg above the bandage, nausea/vomiting/fever/chills/chest pain/shortness of breath, if any are present patient must report to the emergency department immediately  -Patient is to follow up with Dr. Nielsen/Dr. Camara/ Dr. Becerril  within 5 days after discharge at [facility] [place]. Please call to make an appointment [phone].

## 2024-09-16 NOTE — PROGRESS NOTE ADULT - ATTENDING COMMENTS
Discussed with patient MRI (+) for OM to the left hallux distal phalanx and medial aspect of the head of the proximal phalanx   Discussed surgical vs conservative treatment with bone biopsy vs amputation. Patient and family want to salvage the toe. Discussed surgical wound debridement and bone  biopsy of the left hallux.   C/w IV antibiotics per infectious disease   Appreciate vascular recommendations   Procedure discussed at length with patient regarding risks, complications, benefits, as well as pre/intra/post-operative expectations. Patient verbally consents to procedure and understood discussion regarding procedure and all questions were answered to patient's satisfaction at this time.  Patient scheduled for left great toe bone biopsy and wound debridement . Discussed with patient and family that patient is still high risk for more proximal amputation, loss of limb, sepsis and loss of life.   Recommend PICC line antibiotics post biopsy   Request medical and cardiac optimization and risk stratification  Please keep patient NPO after midnight for surgery scheduled tomorrow
Agreed as above
Patient to obtain PICC line for abx  Patient to f/u at the wound care center

## 2024-09-16 NOTE — PATIENT CHOICE NOTE. - NSPTCHOICENOTES_GEN_ALL_CORE
D/C resource folder provided at bedside which includes lists for both rehab facilities and home care agencies and transportation letter advising on ambulance and ambulette transportation. CM reviewed folder during assessment. Pt chose [facility] [phone] and [facility] [phone]. CM sent appropriate referrals

## 2024-09-16 NOTE — PROGRESS NOTE ADULT - SUBJECTIVE AND OBJECTIVE BOX
PROGRESS NOTE   Patient is a 69y old  Male who presents with a chief complaint of OM (16 Sep 2024 09:12)      HPI:  69 Y.O.M with PMH of DM , HTN, HLD   presented to ED was recently on vacation in [facility] and while he was there he stepped on a pine needle that was 2nd week of August  on the 1 st L toe according to him and it was getting red and swollen , his wife is a physician ( neurologist ) and his family member in [place] as well is physician and he was prescribed antibiotics , per podiatry note Augmentin and flagyl  then [place] came to [place] he was still having the L toe ulcer and was not improving so he went to his PCP who prescribed for him cefprozil  he was seen by podiatry that recommended wound care center and on eval was found to have probing down to bone so was advised to come to ED  patient denied any fever chills , nausea , vomiting , no chills   no discharge from L big toe , no numbness or tingling   no urinary or bowel changes    (12 Sep 2024 01:43)      Vital Signs Last 24 Hrs  T(C): 36.3 (16 Sep 2024 11:45), Max: 36.8 (15 Sep 2024 12:25)  T(F): 97.4 (16 Sep 2024 11:45), Max: 98.2 (15 Sep 2024 12:25)  HR: 67 (16 Sep 2024 11:45) (63 - 67)  BP: 119/69 (16 Sep 2024 11:45) (102/62 - 138/78)  BP(mean): --  RR: 20 (16 Sep 2024 11:45) (18 - 20)  SpO2: 95% (16 Sep 2024 11:45) (91% - 95%)    Parameters below as of 16 Sep 2024 11:45  Patient On (Oxygen Delivery Method): room air                              12.7   9.39  )-----------( 417      ( 16 Sep 2024 06:55 )             41.1               09-16    138  |  101  |  11  ----------------------------<  234<H>  4.7   |  30  |  1.00    Ca    9.6      16 Sep 2024 06:55    TPro  7.2  /  Alb  3.6  /  TBili  0.3  /  DBili  x   /  AST  11<L>  /  ALT  22  /  AlkPhos  79  09-15      PHYSICAL EXAM  Vascular: DP & PT palpable bilaterally, Capillary refill 3 seconds, Digital hair present bilaterally  Neurological: Light touch sensation diminished to digits   Musculoskeletal: 5/5 strength in all quadrants bilaterally, AJ & STJ ROM intact  Dermatological: Incision site of the left foot noted with intact sutures, no dehiscence, mild josemanuel-incision erythema, no proximal streaking, no fluctuance, no malodor, no signs of infection at this time.     PROGRESS NOTE   Patient is a 69y old  Male who presents with a chief complaint of OM (16 Sep 2024 09:12)      HPI:  69 Y.O.M with PMH of DM , HTN, HLD   presented to ED was recently on vacation in [facility] and while he was there he stepped on a pine needle that was 2nd week of August  on the 1 st L toe according to him and it was getting red and swollen , his wife is a physician ( neurologist ) and his family member in [place] as well is physician and he was prescribed antibiotics , per podiatry note Augmentin and flagyl  then [place] came to [place] he was still having the L toe ulcer and was not improving so he went to his PCP who prescribed for him cefprozil  he was seen by podiatry that recommended wound care center and on eval was found to have probing down to bone so was advised to come to ED  patient denied any fever chills , nausea , vomiting , no chills   no discharge from L big toe , no numbness or tingling   no urinary or bowel changes    (12 Sep 2024 01:43)      Vital Signs Last 24 Hrs  T(C): 36.3 (16 Sep 2024 11:45), Max: 36.8 (15 Sep 2024 12:25)  T(F): 97.4 (16 Sep 2024 11:45), Max: 98.2 (15 Sep 2024 12:25)  HR: 67 (16 Sep 2024 11:45) (63 - 67)  BP: 119/69 (16 Sep 2024 11:45) (102/62 - 138/78)  BP(mean): --  RR: 20 (16 Sep 2024 11:45) (18 - 20)  SpO2: 95% (16 Sep 2024 11:45) (91% - 95%)    Parameters below as of 16 Sep 2024 11:45  Patient On (Oxygen Delivery Method): room air                              12.7   9.39  )-----------( 417      ( 16 Sep 2024 06:55 )             41.1               09-16    138  |  101  |  11  ----------------------------<  234<H>  4.7   |  30  |  1.00    Ca    9.6      16 Sep 2024 06:55    TPro  7.2  /  Alb  3.6  /  TBili  0.3  /  DBili  x   /  AST  11<L>  /  ALT  22  /  AlkPhos  79  09-15      PHYSICAL EXAM  Vascular: DP & PT palpable bilaterally, Capillary refill 3 seconds, Digital hair present bilaterally  Neurological: Light touch sensation diminished to digits   Musculoskeletal: 5/5 strength in all quadrants bilaterally, AJ & STJ ROM intact  Dermatological: Incision site of the left foot noted with intact sutures, no dehiscence, mild josemanuel-incision erythema, no proximal streaking, no fluctuance, no malodor, no signs of infection at this time. Left plantar hallux wound 0.7c, x 0.5 cm  down to bone

## 2024-09-17 ENCOUNTER — TRANSCRIPTION ENCOUNTER (OUTPATIENT)
Age: 69
End: 2024-09-17

## 2024-09-17 VITALS
DIASTOLIC BLOOD PRESSURE: 67 MMHG | HEART RATE: 62 BPM | OXYGEN SATURATION: 95 % | RESPIRATION RATE: 18 BRPM | TEMPERATURE: 97 F | SYSTOLIC BLOOD PRESSURE: 127 MMHG

## 2024-09-17 LAB
ANION GAP SERPL CALC-SCNC: 7 MMOL/L — SIGNIFICANT CHANGE UP (ref 5–17)
BUN SERPL-MCNC: 14 MG/DL — SIGNIFICANT CHANGE UP (ref 7–23)
CALCIUM SERPL-MCNC: 9.1 MG/DL — SIGNIFICANT CHANGE UP (ref 8.5–10.1)
CHLORIDE SERPL-SCNC: 102 MMOL/L — SIGNIFICANT CHANGE UP (ref 96–108)
CO2 SERPL-SCNC: 31 MMOL/L — SIGNIFICANT CHANGE UP (ref 22–31)
CREAT SERPL-MCNC: 0.97 MG/DL — SIGNIFICANT CHANGE UP (ref 0.5–1.3)
CULTURE RESULTS: SIGNIFICANT CHANGE UP
EGFR: 84 ML/MIN/1.73M2 — SIGNIFICANT CHANGE UP
GLUCOSE SERPL-MCNC: 207 MG/DL — HIGH (ref 70–99)
POTASSIUM SERPL-MCNC: 4.7 MMOL/L — SIGNIFICANT CHANGE UP (ref 3.5–5.3)
POTASSIUM SERPL-SCNC: 4.7 MMOL/L — SIGNIFICANT CHANGE UP (ref 3.5–5.3)
SODIUM SERPL-SCNC: 140 MMOL/L — SIGNIFICANT CHANGE UP (ref 135–145)
SPECIMEN SOURCE: SIGNIFICANT CHANGE UP

## 2024-09-17 PROCEDURE — 36573 INSJ PICC RS&I 5 YR+: CPT

## 2024-09-17 PROCEDURE — 99232 SBSQ HOSP IP/OBS MODERATE 35: CPT

## 2024-09-17 PROCEDURE — 77001 FLUOROGUIDE FOR VEIN DEVICE: CPT | Mod: 26,59

## 2024-09-17 PROCEDURE — 99239 HOSP IP/OBS DSCHRG MGMT >30: CPT

## 2024-09-17 PROCEDURE — 76937 US GUIDE VASCULAR ACCESS: CPT | Mod: 26,59

## 2024-09-17 PROCEDURE — 36000 PLACE NEEDLE IN VEIN: CPT | Mod: 59

## 2024-09-17 RX ORDER — INSULIN GLARGINE 100 [IU]/ML
15 INJECTION, SOLUTION SUBCUTANEOUS
Refills: 0 | DISCHARGE

## 2024-09-17 RX ORDER — INSULIN GLARGINE 100 [IU]/ML
18 INJECTION, SOLUTION SUBCUTANEOUS
Qty: 5 | Refills: 0
Start: 2024-09-17

## 2024-09-17 RX ORDER — RDII 250 MG CAPSULE
1
Qty: 80 | Refills: 0
Start: 2024-09-17 | End: 2024-10-26

## 2024-09-17 RX ORDER — CHLORHEXIDINE GLUCONATE 40 MG/ML
1 SOLUTION TOPICAL
Refills: 0 | Status: DISCONTINUED | OUTPATIENT
Start: 2024-09-17 | End: 2024-09-17

## 2024-09-17 RX ORDER — SODIUM CHLORIDE 9 MG/ML
10 INJECTION INTRAMUSCULAR; INTRAVENOUS; SUBCUTANEOUS
Refills: 0 | Status: DISCONTINUED | OUTPATIENT
Start: 2024-09-17 | End: 2024-09-17

## 2024-09-17 RX ADMIN — Medication 6 UNIT(S): at 09:21

## 2024-09-17 RX ADMIN — Medication 5000 UNIT(S): at 06:11

## 2024-09-17 RX ADMIN — Medication 6 UNIT(S): at 12:02

## 2024-09-17 RX ADMIN — LISINOPRIL 20 MILLIGRAM(S): 10 TABLET ORAL at 06:10

## 2024-09-17 RX ADMIN — Medication 4: at 09:21

## 2024-09-17 RX ADMIN — Medication 5000 UNIT(S): at 13:06

## 2024-09-17 RX ADMIN — RDII 250 MG CAPSULE 250 MILLIGRAM(S): at 06:11

## 2024-09-17 RX ADMIN — Medication 6: at 12:02

## 2024-09-17 RX ADMIN — Medication 100 MILLIGRAM(S): at 15:43

## 2024-09-17 NOTE — PROGRESS NOTE ADULT - NS ATTEND AMEND GEN_ALL_CORE FT
69 Y.O. M with PMH of DM , HTN, HLD presented to the ED with non-healing wound on the left big toe c/w OM.  Cardiology was consulted for pre-surgical clearance prior to Podiatric surgical intervention.    - No acute changes on EKG compared to previous.  - No meaningful evidence of volume overload.    - Pt has no active ischemia, decompensated heart failure, unstable arrythmia, or severe stenotic valvular disease.  - In the setting of low risk bone biopsy, he is optimized from cardiovascular standpoint to proceed with planned procedure with routine hemodynamic monitoring.     - BP well controlled with one isolated elevated, can be reactive to pain  - Continue Lisinopril 20 mg  - Continue Atorvastatin 20 mg    -Monitor electrolytes, replete to keep K>4 and Mag>2  -Will continue to follow
69 Y.O. M with PMH of DM , HTN, HLD presented to the ED with non-healing wound on the left big toe c/w OM.  Cardiology was consulted for pre-surgical clearance prior to Podiatric surgical intervention.    - S/P Surgical LLE debridement soft tissue and bone debridement,  tolerated well  - No acute changes on EKG compared to previous.  - No meaningful evidence of volume overload.    - BP well controlled   - Continue Lisinopril 20 mg  - Continue Atorvastatin 20 mg
69 Y.O. M with PMH of DM , HTN, HLD presented to the ED with non-healing wound on the left big toe c/w OM.  Cardiology was consulted for pre-surgical clearance prior to Podiatric surgical intervention.    - S/P Surgical LLE debridement soft tissue and bone debridement,  tolerated well  - No acute changes on EKG compared to previous.  - No meaningful evidence of volume overload.    - BP well controlled   - Continue Lisinopril 20 mg  - Continue Atorvastatin 20 mg
69 Y.O. M with PMH of DM , HTN, HLD presented to the ED with non-healing wound on the left big toe c/w OM.  Cardiology was consulted for pre-surgical clearance prior to Podiatric surgical intervention.    - BP stable and controlled   - Continue Lisinopril 20 mg  - Continue Atorvastatin 20 mg  - No evidence of any meaningful volume overload   - Tolerated procedure from a cardiac point of view
69 Y.O. M with PMH of DM , HTN, HLD presented to the ED with non-healing wound on the left big toe c/w OM.  Cardiology was consulted for pre-surgical clearance prior to Podiatric surgical intervention.    - BP stable and controlled   - Continue Lisinopril 20 mg BID  - Continue Atorvastatin 20 mg  - No evidence of any meaningful volume overload   - Tolerated procedure from a cardiac point of view.  DC planning

## 2024-09-17 NOTE — DISCHARGE NOTE PROVIDER - NSDCCPCAREPLAN_GEN_ALL_CORE_FT
PRINCIPAL DISCHARGE DIAGNOSIS  Diagnosis: Diabetic foot infection  Assessment and Plan of Treatment: You had an infection of your left big toe that had not been responding well to oral antibiotics. You had an MRI which was consistent with an infection in the bone. You were treated with IV antibiotics and had debridement of the infected ulceration as well as a bone biopsy. There was no clear evidence of bone infection in the bone fragments from the biopsy on pathology, but your infectious disease physician and podiatrist feel the suspicion for bone infection based on MRI and on clinical exam was high enough that they recommend treatment with 6-week course of IV abx.  Please continue taking ceftriaxone 2000mg IV once a day until 10/23/24.   Please take the probiotic, saccharomyces, as prescribed for the duration of your antibiotics to help decrease chance of gastrointestinal side effects of antibiotics. If your pharmacy does not have saccharomyces available, you may take another probiotic that they do have. These are typically over the counter medications.  Follow up with podiatrist (Dr. Becerril) and infectious disease physician (Dr. Ojeda) in the wound care center. Please see the podiatrist within a week.  Wound Care Instructions per your podiatry team:  -Keep dressing clean, dry, and intact to the left foot  -Apply adaptic, gauze, cheryle, change every other day   -Patient weight bearing as tolerated in surgical shoe   -Monitor for any signs of infection including redness, swelling in the leg above the bandage, nausea/vomiting/fever/chills/chest pain/shortness of breath, if any are present patient must report to the emergency department immediately  -Patient is to follow up with Dr. Nielsen/Dr. Camara/ Dr. Becerril  within 5 days after discharge at [facility]. Please call to make an appointment [phone].      SECONDARY DISCHARGE DIAGNOSES  Diagnosis: Type 2 diabetes mellitus  Assessment and Plan of Treatment: You were found to have a hemoglobin A1c of 9.3%, which indicated your glucose was not controlled on average over the past 3 months. Per discussion with you and your wife, your glucose had been recently much better controlled ~150 at home for past few weeks after changing your diet.   Please continue your home regimen of oral diabetes medications and please increase your lantus to 18units at night, as prescribed.   Monitor your glucose level before meals and make a log of values to show your PCP or endocrinologist. Follow up in a week with your doctor and discuss if you need to make any changes in the dosing of your diabetes medications based on that log.

## 2024-09-17 NOTE — DISCHARGE NOTE NURSING/CASE MANAGEMENT/SOCIAL WORK - NSSCTYPOFSERV_GEN_ALL_CORE
Visiting Nurse- you should receive a call within 24-48 hours to schedule the first visit. If you have not received a call please contact the agency at the number listed above.

## 2024-09-17 NOTE — DISCHARGE NOTE PROVIDER - NSDCACTIVITY_GEN_ALL_CORE
Follow Instructions Provided by your Surgical Team/Activity as tolerated Follow Instructions Provided by your Surgical Team

## 2024-09-17 NOTE — DISCHARGE NOTE PROVIDER - PROVIDER TOKENS
PROVIDER:[TOKEN:[[id number]]] PROVIDER:[TOKEN:[[id number]]],PROVIDER:[TOKEN:[[id number]]]

## 2024-09-17 NOTE — DISCHARGE NOTE PROVIDER - HOSPITAL COURSE
HPI as documented in H&P:  69 Y.O.M with PMH of DM , HTN, HLD   presented to ED was recently on vacation in [place] and while he was there he stepped on a pine needle that was 2nd week of August  on the 1 st L toe according to him and it was getting red and swollen , his wife is a physician ( neurologist ) and his family member in [place] as well is physician and he was prescribed antibiotics , per podiatry note Augmentin and flagyl  then [place] came to [place]1 he was still having the L toe ulcer and was not improving so he went to his PCP who prescribed for him cefprozil  he was seen by podiatry that recommended wound care center and on eval was found to have probing down to bone so was advised to come to ED  patient denied any fever chills , nausea , vomiting , no chills   no discharge from L big toe , no numbness or tingling   no urinary or bowel changes       Hospital Course:  Pt a/w left hallux diabetic foot infection with suspected underlying OM. MRI was consistent with OM of the hallux. Pt did not wish to pursue surgical resection of infected bone but did agree to bone biopsy / debridement, which podiatry performed on 9/13. and agreed to IV abx for 6 weeks. Cultures grew multiple organisms with suspicion that MSSA and/or Klebsiella were the culprit organisms for the infection and ID rec ceftriaxone on discharge. Pt tolerated ceftriaxone well and home care w/ IV abx was set up by [facility]. Bone biopsy pathology showed: ____________ HPI as documented in H&P:  69 Y.O.M with PMH of DM , HTN, HLD   presented to ED was recently on vacation in [place] and while he was there he stepped on a pine needle that was 2nd week of August  on the 1 st L toe according to him and it was getting red and swollen , his wife is a physician ( neurologist ) and his family member in [place] as well is physician and he was prescribed antibiotics , per podiatry note Augmentin and flagyl  then [place] came to [place]1 he was still having the L toe ulcer and was not improving so he went to his PCP who prescribed for him cefprozil  he was seen by podiatry that recommended wound care center and on eval was found to have probing down to bone so was advised to come to ED  patient denied any fever chills , nausea , vomiting , no chills   no discharge from L big toe , no numbness or tingling   no urinary or bowel changes     Hospital Course:  Pt a/w left hallux diabetic foot infection with suspected underlying OM. MRI was consistent with OM of the hallux. Pt did not wish to pursue surgical resection of infected bone but did agree to bone biopsy / debridement, which podiatry performed on 9/13. Pt was noted to have Hgb A1c of 9.3% and diabetes NP consulted on the case. Pt reports recently having glucose in the 150s at home after changing his diet a few weeks prior to admission. Basal/bolus insulin titrated in the hospital.   Cultures grew multiple organisms with suspicion that MSSA and/or Klebsiella were the culprit organisms for the infection and ID rec ceftriaxone on discharge. Pt tolerated ceftriaxone well and home care w/ IV abx was set up by [facility]. Bone biopsy pathology showed: "Reactive bone and bone fragments, with focally necrotic fatty marrow and intramedullary hemorrhage. No histological evidence of osteomyelitis is noted."  Discussed findings with ID and podiatry who despite the biopsy findings, both still had high clinical concern for OM that may not have been captured on the biopsy sample and still recommend 6-week course of rocephin. Pt and his family were in agreement to take the antibiotic course to avoid risk of the biopsy having missed part of the bone that had OM.   Pt was discharged with ceftriaxone via PICC line and home care for IV abx.     On day of discharge:  ROS: pt denies foot pain. Pt denies fever, chills, SOB, CP, abd pain, n/v/d.   Vital Signs Last 24 Hrs  T(C): 36.1 (17 Sep 2024 12:23), Max: 36.9 (17 Sep 2024 09:06)  T(F): 97 (17 Sep 2024 12:23), Max: 98.5 (17 Sep 2024 09:06)  HR: 62 (17 Sep 2024 12:23) (62 - 78)  BP: 127/67 (17 Sep 2024 12:23) (105/64 - 141/83)  BP(mean): --  RR: 18 (17 Sep 2024 12:23) (18 - 20)  SpO2: 95% (17 Sep 2024 12:23) (94% - 97%)    Parameters below as of 17 Sep 2024 12:23  Patient On (Oxygen Delivery Method): room air    PHYSICAL EXAM:  GENERAL: NAD  HEENT:  anicteric, moist mucous membranes  CHEST/LUNG:  CTA b/l, no rales, wheezes, or rhonchi  HEART:  RRR, S1, S2  ABDOMEN:  BS+, soft, nontender, nondistended  EXTREMITIES: no edema, cyanosis, or calf tenderness; left hallux ulceration - hallux is large in size, with edema, no active drainage at this time  NERVOUS SYSTEM: answers questions and follows commands appropriately    Time spent: 51min

## 2024-09-17 NOTE — PROGRESS NOTE ADULT - SUBJECTIVE AND OBJECTIVE BOX
[facility]  INFECTIOUS DISEASES   [address] [place] [address]  Tel: [phone]     Fax: [phone]  ========================================================  MD John Elias Michelle, MD Shah, Kaushal, MD Sunjit, Jaspal, MD Sehrish Shahid, MD   ========================================================    [id number]  STANLEY GONSALEZ     Follow up: Feels better, less pain and pressure. No fever.     PAST MEDICAL & SURGICAL HISTORY:  HTN  DM2    Social Hx: No current smoking, EtOH or drugs     FAMILY HISTORY:  Noncontributory     Allergies  No Known Allergies    MEDICATIONS  (STANDING):  atorvastatin 20 milliGRAM(s) Oral at bedtime  cefepime   IVPB 2000 milliGRAM(s) IV Intermittent every 12 hours  dextrose 5%. 1000 milliLiter(s) (100 mL/Hr) IV Continuous <Continuous>  dextrose 5%. 1000 milliLiter(s) (50 mL/Hr) IV Continuous <Continuous>  dextrose 50% Injectable 12.5 Gram(s) IV Push once  dextrose 50% Injectable 25 Gram(s) IV Push once  dextrose 50% Injectable 25 Gram(s) IV Push once  glucagon  Injectable 1 milliGRAM(s) IntraMuscular once  heparin   Injectable 5000 Unit(s) SubCutaneous every 8 hours  insulin glargine Injectable (LANTUS) 8 Unit(s) SubCutaneous at bedtime  insulin lispro (ADMELOG) corrective regimen sliding scale   SubCutaneous three times a day before meals  lisinopril 20 milliGRAM(s) Oral every 12 hours  vancomycin  IVPB 1000 milliGRAM(s) IV Intermittent every 12 hours    MEDICATIONS  (PRN):  dextrose Oral Gel 15 Gram(s) Oral once PRN Blood Glucose LESS THAN 70 milliGRAM(s)/deciliter     REVIEW OF SYSTEMS:  CONSTITUTIONAL:  No Fever or chills  HEENT:  No diplopia or blurred vision.  No sore throat or runny nose.  CARDIOVASCULAR:  No chest pain   RESPIRATORY:  No cough, shortness of breath, PND or orthopnea.  GASTROINTESTINAL:  No nausea, vomiting or diarrhea.  GENITOURINARY:  No dysuria, frequency or urgency. No Blood in urine  MUSCULOSKELETAL:  no joint aches, no muscle pain  SKIN:  foot ulcer     Physical Exam:  Vital Signs Last 24 Hrs  T(C): 36.1 (17 Sep 2024 12:23), Max: 36.9 (17 Sep 2024 09:06)  T(F): 97 (17 Sep 2024 12:23), Max: 98.5 (17 Sep 2024 09:06)  HR: 62 (17 Sep 2024 12:23) (62 - 78)  BP: 127/67 (17 Sep 2024 12:23) (105/64 - 141/83)  BP(mean): --  RR: 18 (17 Sep 2024 12:23) (18 - 20)  SpO2: 95% (17 Sep 2024 12:23) (94% - 97%)  Parameters below as of 17 Sep 2024 12:23  Patient On (Oxygen Delivery Method): room air  GEN: NAD  HEENT: normocephalic and atraumatic. EOMI. PERRL.    NECK: Supple.  No lymphadenopathy   LUNGS: Clear to auscultation.  HEART: Regular rate and rhythm   ABDOMEN: Soft, nontender, and nondistended.    EXTREMITIES: Without edema.  NEUROLOGIC: grossly intact.  PSYCHIATRIC: Appropriate affect .  SKIN: left foot with swelling in 1st toe with an open ulcer   in plantar side with purulent discharge       Labs:                        12.7   9.39  )-----------( 417      ( 16 Sep 2024 06:55 )             41.1     09-17    140  |  102  |  14  ----------------------------<  207[H]  4.7   |  31  |  0.97    Ca    9.1      17 Sep 2024 05:44    Culture - Tissue with Gram Stain (collected 09-13-24 @ 19:30)  Source: Tissue Other, LEFT FOOT  Gram Stain (09-15-24 @ 13:56):    Rare polymorphonuclear leukocytes seen per low power field    No organisms seen per oil power field  Preliminary Report (09-15-24 @ 13:56):    Rare Staphylococcus epidermidis    See previous culture [id number]    Culture - Tissue with Gram Stain (collected 09-13-24 @ 19:30)  Source: Tissue Other, LEFT FOOT  Gram Stain (09-15-24 @ 13:57):    Rare polymorphonuclear leukocytes seen per low power field    No organisms seen per oil power field  Preliminary Report (09-15-24 @ 13:57):    Rare Staphylococcus aureus    Rare Staphylococcus epidermidis  Organism: Staphylococcus aureus  Staphylococcus epidermidis (09-16-24 @ 10:15)  Organism: Staphylococcus epidermidis (09-16-24 @ 10:15)    Sensitivities:      Method Type: TAYLER      -  Clindamycin: S <=0.25      -  Erythromycin: R >4      -  Gentamicin: S <=1 Should not be used as monotherapy      -  Oxacillin: R >2      -  Penicillin: R >8      -  Rifampin: S <=1 Should not be used as monotherapy      -  Tetracycline: S <=1      -  Trimethoprim/Sulfamethoxazole: R >2/38      -  Vancomycin: S 2  Organism: Staphylococcus aureus (09-16-24 @ 10:15)    Sensitivities:      Method Type: TAYLER      -  Clindamycin: S <=0.25      -  Erythromycin: S <=0.25      -  Gentamicin: S <=1 Should not be used as monotherapy      -  Oxacillin: S <=0.25 Oxacillin predicts susceptibility for dicloxacillin, methicillin, and nafcillin      -  Penicillin: R >8      -  Rifampin: S <=1 Should not be used as monotherapy      -  Tetracycline: S <=1      -  Trimethoprim/Sulfamethoxazole: S <=0.5/9.5      -  Vancomycin: S 2    Culture - Tissue with Gram Stain (collected 09-13-24 @ 19:30)  Source: Tissue Other, LEFT FOOT  Gram Stain (09-15-24 @ 13:58):    No polymorphonuclear cells seen per low power field    No organisms seen per oil power field  Preliminary Report (09-16-24 @ 10:15):    Rare Staphylococcus epidermidis    Rare Corynebacterium tuberculostearicum "Susceptibilities not performed"  Organism: Staphylococcus epidermidis (09-16-24 @ 10:14)  Organism: Staphylococcus epidermidis (09-16-24 @ 10:14)    Sensitivities:      Method Type: TAYLER      -  Clindamycin: S <=0.25      -  Erythromycin: S <=0.25      -  Gentamicin: S <=1 Should not be used as monotherapy      -  Oxacillin: R >2      -  Penicillin: R >8      -  Rifampin: S <=1 Should not be used as monotherapy      -  Tetracycline: S <=1      -  Trimethoprim/Sulfamethoxazole: R >2/38      -  Vancomycin: S 2    Culture - Other (collected 09-12-24 @ 11:00)  Source: .Other  Final Report (09-14-24 @ 17:54):    Commensal karen consistent with body site    Culture - Fungal, Other (collected 09-12-24 @ 09:20)  Source: .Other Other, L foot  Preliminary Report (09-14-24 @ 23:03):    Culture is being performed. Fungal cultures are held for 4 weeks.    Culture - Blood (collected 09-11-24 @ 20:13)  Source: .Blood Blood-Peripheral  Final Report (09-17-24 @ 02:00):    No growth at 5 days    Culture - Other (collected 09-11-24 @ 16:37)  Source: .Other Left Plantar Hallux  Final Report (09-14-24 @ 20:41):    Rare Enterobacter cloacae complex    Few Staphylococcus aureus    Rare Klebsiella pneumoniae  Organism: Enterobacter cloacae complex  Klebsiella pneumoniae  Staphylococcus aureus (09-14-24 @ 20:41)  Organism: Staphylococcus aureus (09-14-24 @ 20:41)    Sensitivities:      Method Type: TAYLER      -  Clindamycin: S <=0.25      -  Erythromycin: S <=0.25      -  Gentamicin: S <=1 Should not be used as monotherapy      -  Oxacillin: S <=0.25 Oxacillin predicts susceptibility for dicloxacillin, methicillin, and nafcillin      -  Penicillin: R 8      -  Rifampin: S <=1 Should not be used as monotherapy      -  Tetracycline: S <=1      -  Trimethoprim/Sulfamethoxazole: S <=0.5/9.5      -  Vancomycin: S 1  Organism: Klebsiella pneumoniae (09-14-24 @ 20:41)    Sensitivities:      Method Type: TAYLER      -  Amoxicillin/Clavulanic Acid: R >16/8      -  Ampicillin: R >16 These ampicillin results predict results for amoxicillin      -  Ampicillin/Sulbactam: R >16/8      -  Aztreonam: S <=4      -  Cefazolin: R >16      -  Cefepime: S <=2      -  Cefoxitin: S <=8      -  Ceftriaxone: S <=1      -  Ciprofloxacin: S <=0.25      -  Ertapenem: S <=0.5      -  Gentamicin: S <=2      -  Imipenem: S <=1      -  Levofloxacin: S <=0.5      -  Meropenem: S <=1      -  Piperacillin/Tazobactam: R >64      -  Tobramycin: S <=2      -  Trimethoprim/Sulfamethoxazole: S <=0.5/9.5  Organism: Enterobacter cloacae complex (09-14-24 @ 20:41)    Sensitivities:      Method Type: CarbaR      -  Resistance Gene to Carbapenem: Nondet  Organism: Enterobacter cloacae complex (09-14-24 @ 20:41)    Sensitivities:      Method Type: TAYLER      -  Amoxicillin/Clavulanic Acid: R >16/8      -  Ampicillin: R >16 These ampicillin results predict results for amoxicillin      -  Ampicillin/Sulbactam: R >16/8      -  Aztreonam: I 16      -  Cefazolin: R >16      -  Cefepime: R >16      -  Cefoxitin: R >16      -  Ceftazidime/Avibactam: S <=4      -  Ceftolozane/tazobactam: I 4      -  Ceftriaxone: R >32 Enterobacter cloacae, Klebsiella aerogenes, and Citrobacter freundii may develop resistance during prolonged therapy.      -  Ciprofloxacin: S <=0.25      -  Ertapenem: I 1      -  Gentamicin: S <=2      -  Imipenem: S <=1      -  Levofloxacin: S <=0.5      -  Meropenem: S <=1      -  Piperacillin/Tazobactam: R >64 Treatment with Pipercillin/Tazobactam is not recommended in severe infections casued by Klebsiella aerogenes, Enterobacter cloacae complex, and Citrobacter freundii complex.      -  Tobramycin: S <=2      -  Trimethoprim/Sulfamethoxazole: S <=0.5/9.5    WBC Count: 9.39 K/uL (09-16-24 @ 06:55)  WBC Count: 8.67 K/uL (09-15-24 @ 09:10)  WBC Count: 10.45 K/uL (09-14-24 @ 07:56)  WBC Count: 7.17 K/uL (09-13-24 @ 09:35)    Creatinine: 0.97 mg/dL (09-17-24 @ 05:44)  Creatinine: 1.00 mg/dL (09-16-24 @ 06:55)  Creatinine: 0.94 mg/dL (09-15-24 @ 09:10)  Creatinine: 0.78 mg/dL (09-14-24 @ 07:56)  Creatinine: 0.88 mg/dL (09-13-24 @ 09:35)    C-Reactive Protein: <3 mg/L (09-12-24 @ 08:46)  C-Reactive Protein: <3 mg/L (09-11-24 @ 20:10)    Ferritin: 38 ng/mL (09-12-24 @ 08:46)    Sedimentation Rate, Erythrocyte: 18 mm/hr (09-12-24 @ 08:46)  Sedimentation Rate, Erythrocyte: 20 mm/hr (09-11-24 @ 20:10)       All imaging and other data have been reviewed.  < from: MR Foot w/wo IV Cont, Left (09.12.24 @ 11:49) >  IMPRESSION:  1.  Plantar great toe skin wound with small subjacent fluid collection   and marked surrounding soft tissue enhancement concerning for synovitis   with an evolving infected collection.  2.  Abnormal marrow signal within the first distal phalanx and the medial   margin of the first proximal phalangeal head. Given the overlying skin   wound, findings are concerning for osteomyelitis.    Assessment and Plan:   68yo man with PMH of HTN and DM2 was admitted with left 1st toe infection. He was recently on vacation in [facility] and while he was there he stepped on a pine needle that was 2nd week of August on the 1st L toe   according to him and it was getting red and swollen , his wife is a physician ( neurologist ) and his family member in [facility] as well is physician and he was prescribed antibiotics, per podiatry note Augmentin and flagyl.  then [place] came to [place] on 9[place]1 he was still having the L toe ulcer and was not improving so he went to his PCP who prescribed for him cefprozil.   he was seen by podiatry that recommended wound care center and on eval was found to have probing down to bone so was advised to come to ED.    # Left 1st toe infection r/o OM  # R/o fungal infection? Sporotrichosis     - Blood cultures NGTD  - Fungal culture NGTD  - Wound culture after tissue biopsy with MSSA, MRSE and corynebacterium (older one with klebsiella)   - Most likely the only real pathogen is MSSA., other ones normal skin karen or contaminant   - Podiatry follow up noted   - MRI result noted, has OM of distal phalanx of left 1st toe  - Continue ceftriaxone 2mg daily  - PICC for 6 weeks treatment.   - Last day would be 10/23  - CBC, CMP and CRP weekly  - [facility]    Will sign off please call with any question.     Rhett Ojeda MD  Division of Infectious Diseases   Please call ID service at [phone] with any question.    50 minutes spent on total encounter assessing patient, examination, chart review, counseling and coordinating care by the attending physician/nurse/care manager.

## 2024-09-17 NOTE — PROGRESS NOTE ADULT - ASSESSMENT
69 Y.O. M with PMH of DM , HTN, HLD presented to the ED with non-healing wound on the left big toe c/w OM.  Cardiology was consulted for pre-surgical clearance prior to Podiatric surgical intervention.    - S/p Surgical LLE debridement soft tissue and bone debridement  - Tolerated procedure well, cardiac status optimal post operatively   - S/p PICC, D/c plan     - BP stable and controlled   - Continue Lisinopril 20 mg    - No acute changes on EKG compared to previous.  - No evidence of any active ischemia   - Continue Atorvastatin 20 mg    - No evidence of any meaningful volume overload     - Monitor and replete lytes, keep K>4, Mg>2.  - Will continue to follow.    Vlad Quezada, MS FNP, AGACNP  Nurse Practitioner- Cardiology   Please call on TEAMS

## 2024-09-17 NOTE — CASE MANAGEMENT PROGRESS NOTE - NSCMPROGRESSNOTE_GEN_ALL_CORE
Per MD pt is medically cleared for discharge today 9/17/24. CM met with patient to discuss transition of care. Pt gave verbal consent to speak with wife Lata. CM called to update spouse Lata Rico [phone], CM left message with call back information. Pt is to be transitioned to home with [facility] [phone] SOC 9/18/24, .CM confirmed with home care agency, pt case is being accepted and home care was made aware of DC plan today. Pt is to be transitioned to home with [facility] [phone] CM confirmed with home care infusion agency, pt case is being accepted and home care was made aware of DC plan today. CM explained home care expectations, process, insurance provisions and home safety with good understanding. Pt stated his wife will assist in care of patient. Pt aware of plan and in agreement. IMM discussed / given. Pt verbalized understanding. Confirmed pharmacy is [facility]. community MD is Dr. Bruno. Pt stated they would make their own follow up appointments. Pt denies DME and need for usage. Pt stated his spouse will transport pt home. CM discussed plan with MD and RN. CM to remain available through hospitalization.

## 2024-09-17 NOTE — PROGRESS NOTE ADULT - PROVIDER SPECIALTY LIST ADULT
Cardiology
Hospitalist
Hospitalist
Infectious Disease
Cardiology
Hospitalist
Infectious Disease
Podiatry
Hospitalist
Hospitalist
Infectious Disease
Cardiology
Podiatry
Podiatry

## 2024-09-17 NOTE — DISCHARGE NOTE PROVIDER - NSDCMRMEDTOKEN_GEN_ALL_CORE_FT
atorvastatin 20 mg oral tablet: 1 tab(s) orally once a day (at bedtime)  insulin glargine 100 units/mL subcutaneous solution: 15 unit(s) subcutaneous once a day (at bedtime)  metFORMIN 1000 mg oral tablet: 1 tab(s) orally 2 times a day  Onglyza 5 mg oral tablet: 1 tab(s) orally once a day  ramipril 5 mg oral tablet: 1 tab(s) orally every 12 hours   atorvastatin 20 mg oral tablet: 1 tab(s) orally once a day (at bedtime)  cefTRIAXone: 2,000 milligram(s) intravenous every 24 hours until 10/23/24  Lantus Solostar Pen 100 units/mL subcutaneous solution: 18 unit(s) subcutaneous once a day (at bedtime)  metFORMIN 1000 mg oral tablet: 1 tab(s) orally 2 times a day  Onglyza 5 mg oral tablet: 1 tab(s) orally once a day  ramipril 5 mg oral tablet: 1 tab(s) orally every 12 hours  saccharomyces boulardii lyo 250 mg oral capsule: 1 cap(s) orally 2 times a day

## 2024-09-17 NOTE — DISCHARGE NOTE PROVIDER - NSDCFUADDAPPT_GEN_ALL_CORE_FT
It is advisable to follow up with your primary care provider within the next 1 week. You have stated you will make your own follow up appointments.  It is advisable to follow up with your primary care provider within the next 1 week.

## 2024-09-17 NOTE — DISCHARGE NOTE PROVIDER - CARE PROVIDER_API CALL
Madeleine Becerril [facility]  Podiatric Medicine  [address] [place] [id number]  Phone: [phone]  Fax: [phone]  Follow Up Time:    Madeleine Becerril [facility]  Podiatric Medicine  [address] [place] [address]  Phone: [phone]  Fax: [phone]  Follow Up Time:     Rhett Ojeda  Infectious Disease  [address] [place] [address]  Phone: [phone]  Fax: [phone]  Follow Up Time:

## 2024-09-17 NOTE — PROGRESS NOTE ADULT - REASON FOR ADMISSION
OM
suspected OM of left hallux
OM
suspected OM of left hallux
OM
suspected OM of left hallux
suspected OM of left hallux

## 2024-09-17 NOTE — DISCHARGE NOTE NURSING/CASE MANAGEMENT/SOCIAL WORK - NSDCPEFALRISK_GEN_ALL_CORE
For information on Fall & Injury Prevention, visit: [email or web address] OR  [email or web address] OR  https://www.cdc.gov/steadi/patient.html

## 2024-09-17 NOTE — DISCHARGE NOTE NURSING/CASE MANAGEMENT/SOCIAL WORK - PATIENT PORTAL LINK FT
You can access the FollowMyHealth Patient Portal offered by [facility] by registering at the following website: [email or web address]. By joining [facility]’s FollowMyHealth portal, you will also be able to view your health information using other applications (apps) compatible with our system.

## 2024-09-17 NOTE — DISCHARGE NOTE PROVIDER - NSDCHHHOMEBOUNDOTHER_GEN_ALL_CORE_FT
IV antibiotics training and PICC line care  s/p foot surgery  Wound Care Instructions for left great toe: Apply adaptic, gauze, cheryle, and change every other day

## 2024-09-17 NOTE — PROGRESS NOTE ADULT - SUBJECTIVE AND OBJECTIVE BOX
[facility] -- Douglas Hernandes, Mj Witt Savella, Goodger, Cohen: Office # [phone]    Follow Up:  Cardiac Optimization S/P Debridement foot wound, HTN     Subjective/Observations: Patient awake, alert, resting in bed. Denies chest pain, palpitations and dizziness. Denies any difficulty breathing. No orthopnea and PND. Tolerating room air. Left foot DSD intact.     REVIEW OF SYSTEMS: All other review of systems are negative unless indicated above    PAST MEDICAL & SURGICAL HISTORY:      MEDICATIONS  (STANDING):  atorvastatin 20 milliGRAM(s) Oral at bedtime  cefTRIAXone   IVPB 2000 milliGRAM(s) IV Intermittent every 24 hours  chlorhexidine 2% Cloths 1 Application(s) Topical <User Schedule>  dextrose 5%. 1000 milliLiter(s) (50 mL/Hr) IV Continuous <Continuous>  dextrose 50% Injectable 25 Gram(s) IV Push once  dextrose 50% Injectable 12.5 Gram(s) IV Push once  dextrose 50% Injectable 25 Gram(s) IV Push once  glucagon  Injectable 1 milliGRAM(s) IntraMuscular once  heparin   Injectable 5000 Unit(s) SubCutaneous every 8 hours  influenza  Vaccine (HIGH DOSE) 0.5 milliLiter(s) IntraMuscular once  insulin glargine Injectable (LANTUS) 18 Unit(s) SubCutaneous at bedtime  insulin lispro (ADMELOG) corrective regimen sliding scale   SubCutaneous at bedtime  insulin lispro (ADMELOG) corrective regimen sliding scale   SubCutaneous three times a day before meals  insulin lispro Injectable (ADMELOG) 6 Unit(s) SubCutaneous three times a day before meals  lisinopril 20 milliGRAM(s) Oral every 12 hours  saccharomyces boulardii 250 milliGRAM(s) Oral two times a day    MEDICATIONS  (PRN):  acetaminophen     Tablet .. 650 milliGRAM(s) Oral every 6 hours PRN Mild Pain (1 - 3)  dextrose Oral Gel 15 Gram(s) Oral once PRN Blood Glucose LESS THAN 70 milliGRAM(s)/deciliter  ondansetron Injectable 4 milliGRAM(s) IV Push once PRN Nausea and/or Vomiting  oxyCODONE    IR 5 milliGRAM(s) Oral every 4 hours PRN Severe Pain (7 - 10)  sodium chloride 0.9% lock flush 10 milliLiter(s) IV Push every 1 hour PRN Pre/post blood products, medications, blood draw, and to maintain line patency    Allergies    No Known Allergies    Intolerances      Vital Signs Last 24 Hrs  T(C): 36.9 (17 Sep 2024 09:06), Max: 36.9 (17 Sep 2024 09:06)  T(F): 98.5 (17 Sep 2024 09:06), Max: 98.5 (17 Sep 2024 09:06)  HR: 78 (17 Sep 2024 09:06) (68 - 78)  BP: 141/83 (17 Sep 2024 09:06) (105/64 - 141/83)  BP(mean): --  RR: 20 (17 Sep 2024 09:06) (18 - 20)  SpO2: 95% (17 Sep 2024 09:06) (94% - 97%)    Parameters below as of 17 Sep 2024 09:06  Patient On (Oxygen Delivery Method): room air      I&O's Summary        TELE: Not on telemetry   PHYSICAL EXAM:  Constitutional: NAD, awake and alert  HEENT: Moist Mucous Membranes, Anicteric  Pulmonary: Non-labored, breath sounds are clear bilaterally, No wheezing, rales or rhonchi  Cardiovascular: Regular, S1 and S2, No murmurs, No rubs, gallops or clicks  Gastrointestinal:  soft, nontender, nondistended   Lymph: No peripheral edema. No lymphadenopathy.   Skin: No visible rashes Left foot DSD intact.   Psych:  Mood & affect appropriate      LABS: All Labs Reviewed:                        12.7   9.39  )-----------( 417      ( 16 Sep 2024 06:55 )             41.1                         12.4   8.67  )-----------( 377      ( 15 Sep 2024 09:10 )             38.3     17 Sep 2024 05:44    140    |  102    |  14     ----------------------------<  207    4.7     |  31     |  0.97   16 Sep 2024 06:55    138    |  101    |  11     ----------------------------<  234    4.7     |  30     |  1.00   15 Sep 2024 09:10    138    |  101    |  11     ----------------------------<  285    4.7     |  30     |  0.94     Ca    9.1        17 Sep 2024 05:44  Ca    9.6        16 Sep 2024 06:55  Ca    9.3        15 Sep 2024 09:10    TPro  7.2    /  Alb  3.6    /  TBili  0.3    /  DBili  x      /  AST  11     /  ALT  22     /  AlkPhos  79     15 Sep 2024 09:10     Cholesterol: 99 mg/dL (09-12-24 @ 08:46)  HDL Cholesterol: 32 mg/dL (09-12-24 @ 08:46)  Triglycerides, Serum: 67 mg/dL (09-12-24 @ 08:46)    12 Lead ECG:   Ventricular Rate 59 BPM    Atrial Rate 59 BPM    P-R Interval 174 ms    QRS Duration 88 ms    Q-T Interval 418 ms    QTC Calculation(Bazett) 413 ms    P Axis -14 degrees    R Axis 61 degrees    T Axis 69 degrees    Diagnosis Line Sinus bradycardia  Septal infarct , age undetermined  Confirmed by YANET WITT ([id number]) on 9/12/2024 8:49:17 AM (09-12-24 @ 07:13)     [facility] -- Douglas Hernandes, Mj Witt Savella, Goodger, Cohen: Office # [phone]    Follow Up:  Cardiac Optimization S/P Debridement foot wound, HTN     Subjective/Observations: Patient awake, alert, resting in bed. Denies chest pain, palpitations and dizziness. Denies any difficulty breathing. No orthopnea and PND. Tolerating room air. Left foot DSD intact.     REVIEW OF SYSTEMS: All other review of systems are negative unless indicated above    PAST MEDICAL & SURGICAL HISTORY:      MEDICATIONS  (STANDING):  atorvastatin 20 milliGRAM(s) Oral at bedtime  cefTRIAXone   IVPB 2000 milliGRAM(s) IV Intermittent every 24 hours  chlorhexidine 2% Cloths 1 Application(s) Topical <User Schedule>  dextrose 5%. 1000 milliLiter(s) (50 mL/Hr) IV Continuous <Continuous>  dextrose 50% Injectable 25 Gram(s) IV Push once  dextrose 50% Injectable 12.5 Gram(s) IV Push once  dextrose 50% Injectable 25 Gram(s) IV Push once  glucagon  Injectable 1 milliGRAM(s) IntraMuscular once  heparin   Injectable 5000 Unit(s) SubCutaneous every 8 hours  influenza  Vaccine (HIGH DOSE) 0.5 milliLiter(s) IntraMuscular once  insulin glargine Injectable (LANTUS) 18 Unit(s) SubCutaneous at bedtime  insulin lispro (ADMELOG) corrective regimen sliding scale   SubCutaneous at bedtime  insulin lispro (ADMELOG) corrective regimen sliding scale   SubCutaneous three times a day before meals  insulin lispro Injectable (ADMELOG) 6 Unit(s) SubCutaneous three times a day before meals  lisinopril 20 milliGRAM(s) Oral every 12 hours  saccharomyces boulardii 250 milliGRAM(s) Oral two times a day    MEDICATIONS  (PRN):  acetaminophen     Tablet .. 650 milliGRAM(s) Oral every 6 hours PRN Mild Pain (1 - 3)  dextrose Oral Gel 15 Gram(s) Oral once PRN Blood Glucose LESS THAN 70 milliGRAM(s)/deciliter  ondansetron Injectable 4 milliGRAM(s) IV Push once PRN Nausea and/or Vomiting  oxyCODONE    IR 5 milliGRAM(s) Oral every 4 hours PRN Severe Pain (7 - 10)  sodium chloride 0.9% lock flush 10 milliLiter(s) IV Push every 1 hour PRN Pre/post blood products, medications, blood draw, and to maintain line patency    Allergies    No Known Allergies    Intolerances      Vital Signs Last 24 Hrs  T(C): 36.9 (17 Sep 2024 09:06), Max: 36.9 (17 Sep 2024 09:06)  T(F): 98.5 (17 Sep 2024 09:06), Max: 98.5 (17 Sep 2024 09:06)  HR: 78 (17 Sep 2024 09:06) (68 - 78)  BP: 141/83 (17 Sep 2024 09:06) (105/64 - 141/83)  BP(mean): --  RR: 20 (17 Sep 2024 09:06) (18 - 20)  SpO2: 95% (17 Sep 2024 09:06) (94% - 97%)    Parameters below as of 17 Sep 2024 09:06  Patient On (Oxygen Delivery Method): room air      I&O's Summary        TELE: Not on telemetry   PHYSICAL EXAM:  Constitutional: NAD, awake and alert  HEENT: Moist Mucous Membranes, Anicteric  Pulmonary: Non-labored, breath sounds are clear bilaterally, No wheezing, rales or rhonchi  Cardiovascular: Regular, S1 and S2, No murmurs, No rubs, gallops or clicks  Gastrointestinal:  soft, nontender, nondistended   Lymph: No peripheral edema. No lymphadenopathy.   Skin: No visible rashes Left foot DSD intact.   Psych:  Mood & affect appropriate      LABS: All Labs Reviewed:                        12.7   9.39  )-----------( 417      ( 16 Sep 2024 06:55 )             41.1                         12.4   8.67  )-----------( 377      ( 15 Sep 2024 09:10 )             38.3     17 Sep 2024 05:44    140    |  102    |  14     ----------------------------<  207    4.7     |  31     |  0.97   16 Sep 2024 06:55    138    |  101    |  11     ----------------------------<  234    4.7     |  30     |  1.00   15 Sep 2024 09:10    138    |  101    |  11     ----------------------------<  285    4.7     |  30     |  0.94     Ca    9.1        17 Sep 2024 05:44  Ca    9.6        16 Sep 2024 06:55  Ca    9.3        15 Sep 2024 09:10    TPro  7.2    /  Alb  3.6    /  TBili  0.3    /  DBili  x      /  AST  11     /  ALT  22     /  AlkPhos  79     15 Sep 2024 09:10     Cholesterol: 99 mg/dL (09-12-24 @ 08:46)  HDL Cholesterol: 32 mg/dL (09-12-24 @ 08:46)  Triglycerides, Serum: 67 mg/dL (09-12-24 @ 08:46)    12 Lead ECG:   Ventricular Rate 59 BPM    Atrial Rate 59 BPM    P-R Interval 174 ms    QRS Duration 88 ms    Q-T Interval 418 ms    QTC Calculation(Bazett) 413 ms    P Axis -14 degrees    R Axis 61 degrees    T Axis 69 degrees    Diagnosis Line Sinus bradycardia  Septal infarct , age undetermined  Confirmed by YANET WITT ([id number]) on 9/12/2024 8:49:17 AM (09-12-24 @ 07:13)

## 2024-09-17 NOTE — DISCHARGE NOTE PROVIDER - CARE PROVIDERS DIRECT ADDRESSES
[email or web address]

## 2024-09-17 NOTE — CAREGIVER ENGAGEMENT NOTE - CAREGIVER OUTREACH NOTES - FREE TEXT
Met patient at bedside who gave consent to speak with spouse. CM left message for spouse to discuss transition of care with CM Contact information, CM awaiting call. CM to remain available through hospitalization.

## 2024-09-18 ENCOUNTER — NON-APPOINTMENT (OUTPATIENT)
Age: 69
End: 2024-09-18

## 2024-09-19 LAB
CULTURE RESULTS: ABNORMAL
ORGANISM # SPEC MICROSCOPIC CNT: ABNORMAL
ORGANISM # SPEC MICROSCOPIC CNT: SIGNIFICANT CHANGE UP
ORGANISM # SPEC MICROSCOPIC CNT: SIGNIFICANT CHANGE UP
SPECIMEN SOURCE: SIGNIFICANT CHANGE UP

## 2024-09-20 ENCOUNTER — APPOINTMENT (OUTPATIENT)
Dept: WOUND CARE | Facility: HOSPITAL | Age: 69
End: 2024-09-20
Payer: MEDICARE

## 2024-09-20 ENCOUNTER — OUTPATIENT (OUTPATIENT)
Dept: OUTPATIENT SERVICES | Facility: HOSPITAL | Age: 69
LOS: 1 days | Discharge: HOME | End: 2024-09-20
Payer: MEDICARE

## 2024-09-20 VITALS
HEART RATE: 68 BPM | HEIGHT: 72 IN | TEMPERATURE: 98.9 F | DIASTOLIC BLOOD PRESSURE: 71 MMHG | WEIGHT: 165 LBS | BODY MASS INDEX: 22.35 KG/M2 | SYSTOLIC BLOOD PRESSURE: 118 MMHG | OXYGEN SATURATION: 98 % | RESPIRATION RATE: 18 BRPM

## 2024-09-20 DIAGNOSIS — L97.516 NON-PRESSURE CHRONIC ULCER OF OTHER PART OF RIGHT FOOT WITH BONE INVOLVEMENT WITHOUT EVIDENCE OF NECROSIS: ICD-10-CM

## 2024-09-20 DIAGNOSIS — L97.501 NON-PRESSURE CHRONIC ULCER OF OTHER PART OF UNSPECIFIED FOOT LIMITED TO BREAKDOWN OF SKIN: ICD-10-CM

## 2024-09-20 DIAGNOSIS — E11.621 TYPE 2 DIABETES MELLITUS WITH FOOT ULCER: ICD-10-CM

## 2024-09-20 DIAGNOSIS — L97.509 TYPE 2 DIABETES MELLITUS WITH FOOT ULCER: ICD-10-CM

## 2024-09-20 PROCEDURE — G0463: CPT

## 2024-09-20 PROCEDURE — 99213 OFFICE O/P EST LOW 20 MIN: CPT

## 2024-09-20 RX ORDER — CEFTRIAXONE SODIUM 100 G/1
INJECTION, POWDER, FOR SOLUTION INTRAVENOUS
Refills: 0 | Status: ACTIVE | COMMUNITY

## 2024-09-22 PROBLEM — E11.621 CHRONIC DIABETIC ULCER OF FOOT DETERMINED BY EXAMINATION: Status: ACTIVE | Noted: 2024-09-22

## 2024-09-22 PROBLEM — L97.516 NON-PRESSURE CHRONIC ULCER OF OTHER PART OF RIGHT FOOT WITH BONE INVOLVEMENT WITHOUT EVIDENCE OF NECROSIS: Status: ACTIVE | Noted: 2024-09-22

## 2024-09-22 NOTE — PROCEDURE
[Saline] : saline [Fibrotic] : fibrotic [Scalpel] : scalpel [Skin] : skin [Fat] : fat [Fascia] : fascia [Sent to Lab] : which was entirely removed and was sent to the laboratory for [Culture and Sensitivity] : culture and sensitivity [Pressure] : pressure [FreeTextEntry1] : silver alginate and steril dressing  [FreeTextEntry9] : 3423 [de-identified] : left hallux with edema, erythema, purulent drainage, DFU3 down to bone  [de-identified] : Jone [FreeTextEntry6] : left hallux with edema, erythema, purulent drainage, DFU3 down to bone  [FreeTextEntry7] : left hallux with edema, erythema, purulent drainage, DFU3 down to bone

## 2024-09-22 NOTE — PROCEDURE
[Saline] : saline [Fibrotic] : fibrotic [Scalpel] : scalpel [Skin] : skin [Fat] : fat [Fascia] : fascia [Sent to Lab] : which was entirely removed and was sent to the laboratory for [Culture and Sensitivity] : culture and sensitivity [Pressure] : pressure [FreeTextEntry1] : silver alginate and steril dressing  [FreeTextEntry9] : 4554 [de-identified] : left hallux with edema, erythema, purulent drainage, DFU3 down to bone  [de-identified] : Jone [FreeTextEntry6] : left hallux with edema, erythema, purulent drainage, DFU3 down to bone  [FreeTextEntry7] : left hallux with edema, erythema, purulent drainage, DFU3 down to bone

## 2024-09-22 NOTE — HISTORY OF PRESENT ILLNESS
[FreeTextEntry1] : Pt is a 69 yr old M with a Hx of T2DM, HTN, HLD being seen for a left plantar hallux ulcer that occurred from this pt trying to trim a tree while away [place], this pt reports stepping on a pine needle the 2nd week of 7/24 and is unsure if all of the pine needle was removed. Pt reports 1 week after the incident. his toe began to swell and become erythematous. The pts spouse cousin who is a neuro MD lanced and drained the abscess, they are unsure of the foreign object was removed and he started the pt on PO Augmentin 500mg/[id number] weeks and Flagyl 500mg x 10 days. The swelling around the foot had appeared to be improving overtime but the great toe still appeared discolored, [place] On 9/3 they saw Dr. Bruno who advised this pt to follow up with wound care and started him on Cefproxil 500mg due suspected infection and ulceration.

## 2024-09-22 NOTE — PROCEDURE
[Saline] : saline [Fibrotic] : fibrotic [Scalpel] : scalpel [Skin] : skin [Fat] : fat [Fascia] : fascia [Sent to Lab] : which was entirely removed and was sent to the laboratory for [Culture and Sensitivity] : culture and sensitivity [Pressure] : pressure [FreeTextEntry1] : silver alginate and steril dressing  [FreeTextEntry9] : 7370 [de-identified] : left hallux with edema, erythema, purulent drainage, DFU3 down to bone  [de-identified] : Jone [FreeTextEntry6] : left hallux with edema, erythema, purulent drainage, DFU3 down to bone  [FreeTextEntry7] : left hallux with edema, erythema, purulent drainage, DFU3 down to bone

## 2024-09-22 NOTE — PROCEDURE
[Saline] : saline [Fibrotic] : fibrotic [Scalpel] : scalpel [Skin] : skin [Fat] : fat [Fascia] : fascia [Sent to Lab] : which was entirely removed and was sent to the laboratory for [Culture and Sensitivity] : culture and sensitivity [Pressure] : pressure [FreeTextEntry1] : silver alginate and steril dressing  [FreeTextEntry9] : 3979 [de-identified] : left hallux with edema, erythema, purulent drainage, DFU3 down to bone  [de-identified] : Jone [FreeTextEntry6] : left hallux with edema, erythema, purulent drainage, DFU3 down to bone  [FreeTextEntry7] : left hallux with edema, erythema, purulent drainage, DFU3 down to bone

## 2024-09-22 NOTE — REVIEW OF SYSTEMS
[FreeTextEntry1] : 9197 [Chest Pain] : no chest pain [Skin Wound] : skin wound [Negative] : Psychiatric [de-identified] : Type II diabetes

## 2024-09-22 NOTE — ASSESSMENT
[Verbal] : Verbal [Written] : Written [Demo] : Demo [Patient] : Patient [Spouse] : Spouse [Good - alert, interested, motivated] : Good - alert, interested, motivated [Demonstrates independently] : demonstrates independently [Dressing changes] : dressing changes [Foot Care] : foot care [Skin Care] : skin care [Pressure relief] : pressure relief [Signs and symptoms of infection] : sign and symptoms of infection [Arterial Disease] : arterial disease [How and When to Call] : how and when to call [Labs and Tests] : labs and tests [Pain Management] : pain management [Off-loading] : off-loading [Patient responsibility to plan of care] : patient responsibility to plan of care [Glycemic Control] : glycemic control [] : Yes [Stable] : stable [Home] : Home [Ambulatory] : Ambulatory [Not Applicable - Long Term Care/Home Health Agency] : Long Term Care/Home Health Agency: Not Applicable [Nutrition] : nutrition [Hospitalization] : hospitalization [FreeTextEntry2] : infection prevention promote skin integrity glycemic control demonstrates use of both pharmacological and non pharmacological pain management interventions foot care Hospitalization and discharge offloading/pressure reductions  [FreeTextEntry3] : initial visit  [FreeTextEntry4] : F/U after hospitalization discharge Pt last Hga1c was 9.4% done on 9/3/24, DPM aware. Pt wears a CGM device and reports improving daily BGL trends.  Pt admitted to [facility] ED for L hallux infection, pt to consult with ID and vascular, cultures sent to lab, pt to start IV abx. MRI, foot/chest xrays ordered.  Pt was escorted to the ED by this RN  Pt provided and surgical shoe, allowed to weight bear on the L heel as per DPM

## 2024-09-22 NOTE — PROCEDURE
[Saline] : saline [Fibrotic] : fibrotic [Scalpel] : scalpel [Skin] : skin [Fat] : fat [Fascia] : fascia [Sent to Lab] : which was entirely removed and was sent to the laboratory for [Culture and Sensitivity] : culture and sensitivity [Pressure] : pressure [FreeTextEntry1] : silver alginate and steril dressing  [FreeTextEntry9] : 0570 [de-identified] : left hallux with edema, erythema, purulent drainage, DFU3 down to bone  [de-identified] : Jone [FreeTextEntry6] : left hallux with edema, erythema, purulent drainage, DFU3 down to bone  [FreeTextEntry7] : left hallux with edema, erythema, purulent drainage, DFU3 down to bone

## 2024-09-22 NOTE — REVIEW OF SYSTEMS
[FreeTextEntry1] : 7060 [Chest Pain] : no chest pain [Skin Wound] : skin wound [Negative] : Psychiatric [de-identified] : Type II diabetes

## 2024-09-22 NOTE — REVIEW OF SYSTEMS
[FreeTextEntry1] : 1007 [Chest Pain] : no chest pain [Skin Wound] : skin wound [Negative] : Psychiatric [de-identified] : Type II diabetes

## 2024-09-22 NOTE — PHYSICAL EXAM
[de-identified] : 100% [de-identified] : 3x Weekly [4 x 4] : 4 x 4  [FreeTextEntry5] : post debridement measurements: 0.6 x 0.6 x 0.9 [de-identified] : post debridement measurement: 0.8 x 0.8 x 0.4 [1+] : left 1+ [2+] : left 2+ [Alert] : alert [Oriented to Person] : oriented to person [Oriented to Place] : oriented to place [de-identified] : Aox3, NAD  [de-identified] : 5/5 muscle strength for all muscles and tendons crossing the foot and ankle joints, ankle joint and STJ ROM intact b/l. No pain with calf compression b/l. [de-identified] : left hallux with edema, erythema, purulent drainage, DFU3 down to bone  [FreeTextEntry1] : Left plantar hallux-distal [FreeTextEntry2] : 0.2 [FreeTextEntry3] : 0.2 [FreeTextEntry4] : 0.8 to bone  [de-identified] : serous  [de-identified] : edema/erythema/callus/hot to touch [de-identified] : calcium alginate [de-identified] : Mechanically cleansed with Sterile gauze and 0.9% Normal Saline   Left Dorsal Pedal: nonpalpable, audible via doppler Left Posterior Tibialis: nonpalpable, audible via doppler  Right Dorsal Pedal: nonpalpable, audible via doppler Right Posterior Tibialis: nonpalpable, audible via doppler Cap refill <3 secs skin: erythema, warm, L hallux hot to touch, marked edema of the left foot Vascular studies will be done during this pts hospital stay   calcium alginate applied by DPM prior to ED transport and admission  Cultures obtained and sent to lab [FreeTextEntry7] : left plantar hallux- proximal [FreeTextEntry8] : 0.5 [FreeTextEntry9] : 0.5 [de-identified] : 0.3 [de-identified] : serous  [de-identified] : edema/erythema/callus/hot to touch [de-identified] : 100% [de-identified] : calcium alginate [de-identified] : Mechanically cleansed with Sterile gauze and 0.9% Normal Saline  calcium alginate applied by DPM prior to ED transfer and admission  [TWNoteComboBox4] : Small [TWNoteComboBox5] : No [TWNoteComboBox6] : Other [de-identified] : No [de-identified] : other [de-identified] : None [de-identified] : None [de-identified] : 100% [de-identified] : No [TWNoteComboBox7] : False [de-identified] : False [de-identified] : Primary Dressing [de-identified] : Small [de-identified] : No [de-identified] : Other [de-identified] : No [de-identified] : other [de-identified] : None [de-identified] : None [de-identified] : None [de-identified] : Yes [TWNoteComboBox8] : False [de-identified] : False

## 2024-09-22 NOTE — ASSESSMENT
[Verbal] : Verbal [Written] : Written [Demo] : Demo [Patient] : Patient [Spouse] : Spouse [Good - alert, interested, motivated] : Good - alert, interested, motivated [Demonstrates independently] : demonstrates independently [Dressing changes] : dressing changes [Foot Care] : foot care [Skin Care] : skin care [Pressure relief] : pressure relief [Signs and symptoms of infection] : sign and symptoms of infection [Arterial Disease] : arterial disease [How and When to Call] : how and when to call [Labs and Tests] : labs and tests [Pain Management] : pain management [Off-loading] : off-loading [Patient responsibility to plan of care] : patient responsibility to plan of care [Glycemic Control] : glycemic control [] : Yes [Stable] : stable [Home] : Home [Ambulatory] : Ambulatory [Not Applicable - Long Term Care/Home Health Agency] : Long Term Care/Home Health Agency: Not Applicable [Nutrition] : nutrition [Hospitalization] : hospitalization [FreeTextEntry2] : infection prevention promote skin integrity glycemic control demonstrates use of both pharmacological and non pharmacological pain management interventions foot care Hospitalization and discharge offloading/pressure reductions  [FreeTextEntry3] : initial visit  [FreeTextEntry4] : F/U after hospitalization discharge Pt last Hga1c was 9.4% done on 9/3/24, DPM aware. Pt wears a CGM device and reports improving daily BGL trends.  Pt admitted to [facility] ED for L hallux infection, pt to consult with ID and vascular, cultures sent to lab, pt to start IV abx. MRI, foot/chest xrays ordered.  Pt was escorted to the ED by this RN  Pt provided and surgical shoe, allowed to weight bear on the L heel as per DPM

## 2024-09-22 NOTE — REVIEW OF SYSTEMS
[FreeTextEntry1] : 7199 [Chest Pain] : no chest pain [Skin Wound] : skin wound [Negative] : Psychiatric [de-identified] : Type II diabetes

## 2024-09-22 NOTE — PHYSICAL EXAM
[de-identified] : 100% [de-identified] : 3x Weekly [4 x 4] : 4 x 4  [FreeTextEntry5] : post debridement measurements: 0.6 x 0.6 x 0.9 [de-identified] : post debridement measurement: 0.8 x 0.8 x 0.4 [1+] : left 1+ [2+] : left 2+ [Alert] : alert [Oriented to Person] : oriented to person [Oriented to Place] : oriented to place [de-identified] : Aox3, NAD  [de-identified] : 5/5 muscle strength for all muscles and tendons crossing the foot and ankle joints, ankle joint and STJ ROM intact b/l. No pain with calf compression b/l. [de-identified] : left hallux with edema, erythema, purulent drainage, DFU3 down to bone  [FreeTextEntry1] : Left plantar hallux-distal [FreeTextEntry2] : 0.2 [FreeTextEntry3] : 0.2 [FreeTextEntry4] : 0.8 to bone  [de-identified] : serous  [de-identified] : edema/erythema/callus/hot to touch [de-identified] : calcium alginate [de-identified] : Mechanically cleansed with Sterile gauze and 0.9% Normal Saline   Left Dorsal Pedal: nonpalpable, audible via doppler Left Posterior Tibialis: nonpalpable, audible via doppler  Right Dorsal Pedal: nonpalpable, audible via doppler Right Posterior Tibialis: nonpalpable, audible via doppler Cap refill <3 secs skin: erythema, warm, L hallux hot to touch, marked edema of the left foot Vascular studies will be done during this pts hospital stay   calcium alginate applied by DPM prior to ED transport and admission  Cultures obtained and sent to lab [FreeTextEntry7] : left plantar hallux- proximal [FreeTextEntry8] : 0.5 [FreeTextEntry9] : 0.5 [de-identified] : 0.3 [de-identified] : serous  [de-identified] : edema/erythema/callus/hot to touch [de-identified] : 100% [de-identified] : calcium alginate [de-identified] : Mechanically cleansed with Sterile gauze and 0.9% Normal Saline  calcium alginate applied by DPM prior to ED transfer and admission  [TWNoteComboBox4] : Small [TWNoteComboBox5] : No [TWNoteComboBox6] : Other [de-identified] : No [de-identified] : other [de-identified] : None [de-identified] : None [de-identified] : 100% [de-identified] : No [TWNoteComboBox7] : False [de-identified] : False [de-identified] : Primary Dressing [de-identified] : Small [de-identified] : No [de-identified] : Other [de-identified] : No [de-identified] : other [de-identified] : None [de-identified] : None [de-identified] : None [de-identified] : Yes [TWNoteComboBox8] : False [de-identified] : False

## 2024-09-22 NOTE — PLAN
[FreeTextEntry1] : .Patient seen and evaluated. Discussed etiology and treatment plan. Patient verbalized understanding. Discussed with patient that recommend IV antibiotics and possible surgical intervention. Will recommend vascular and ID. Patient will benefit from HBOT post discharge. Patient agreed with the treatment plan. Patient transported to the ER.  Spent 30 minutes for patient care and medical decision making

## 2024-09-22 NOTE — PHYSICAL EXAM
[de-identified] : 100% [de-identified] : 3x Weekly [4 x 4] : 4 x 4  [FreeTextEntry5] : post debridement measurements: 0.6 x 0.6 x 0.9 [de-identified] : post debridement measurement: 0.8 x 0.8 x 0.4 [1+] : left 1+ [2+] : left 2+ [Alert] : alert [Oriented to Person] : oriented to person [Oriented to Place] : oriented to place [de-identified] : Aox3, NAD  [de-identified] : 5/5 muscle strength for all muscles and tendons crossing the foot and ankle joints, ankle joint and STJ ROM intact b/l. No pain with calf compression b/l. [de-identified] : left hallux with edema, erythema, purulent drainage, DFU3 down to bone  [FreeTextEntry1] : Left plantar hallux-distal [FreeTextEntry2] : 0.2 [FreeTextEntry3] : 0.2 [FreeTextEntry4] : 0.8 to bone  [de-identified] : serous  [de-identified] : edema/erythema/callus/hot to touch [de-identified] : calcium alginate [de-identified] : Mechanically cleansed with Sterile gauze and 0.9% Normal Saline   Left Dorsal Pedal: nonpalpable, audible via doppler Left Posterior Tibialis: nonpalpable, audible via doppler  Right Dorsal Pedal: nonpalpable, audible via doppler Right Posterior Tibialis: nonpalpable, audible via doppler Cap refill <3 secs skin: erythema, warm, L hallux hot to touch, marked edema of the left foot Vascular studies will be done during this pts hospital stay   calcium alginate applied by DPM prior to ED transport and admission  Cultures obtained and sent to lab [FreeTextEntry7] : left plantar hallux- proximal [FreeTextEntry8] : 0.5 [FreeTextEntry9] : 0.5 [de-identified] : 0.3 [de-identified] : serous  [de-identified] : edema/erythema/callus/hot to touch [de-identified] : 100% [de-identified] : calcium alginate [de-identified] : Mechanically cleansed with Sterile gauze and 0.9% Normal Saline  calcium alginate applied by DPM prior to ED transfer and admission  [TWNoteComboBox4] : Small [TWNoteComboBox5] : No [TWNoteComboBox6] : Other [de-identified] : No [de-identified] : other [de-identified] : None [de-identified] : None [de-identified] : 100% [de-identified] : No [TWNoteComboBox7] : False [de-identified] : False [de-identified] : Primary Dressing [de-identified] : Small [de-identified] : No [de-identified] : Other [de-identified] : No [de-identified] : other [de-identified] : None [de-identified] : None [de-identified] : None [de-identified] : Yes [TWNoteComboBox8] : False [de-identified] : False

## 2024-09-22 NOTE — REVIEW OF SYSTEMS
[FreeTextEntry1] : 5556 [Chest Pain] : no chest pain [Skin Wound] : skin wound [Negative] : Psychiatric [de-identified] : Type II diabetes

## 2024-09-22 NOTE — HISTORY OF PRESENT ILLNESS
[FreeTextEntry1] : Pt is a 69 yr old M with a Hx of T2DM, HTN, HLD being seen for a left plantar hallux ulcer that occurred from this pt trying to trim a tree while away [place], this pt reports stepping on a pine needle the 2nd week of 7/24 and is unsure if all of the pine needle was removed. Pt reports 1 week after the incident. his toe began to swell and become erythematous. The pts spouse cousin who is a neuro MD lanced and drained the abscess, they are unsure of the foreign object was removed and he started the pt on PO Augmentin 500mg/[id number] weeks and Flagyl 500mg x 10 days. The swelling around the foot had appeared to be improving overtime but the great toe still appeared discolored, [place] On 9/3 they saw Dr. Bruno who advised this pt to follow up with wound care and started him on Cefproxil 500mg due suspected infection and ulceration.

## 2024-09-22 NOTE — REASON FOR VISIT
[Spouse] : spouse [Other: _____] : [unfilled] [FreeTextEntry5] : left hallux  [FreeTextEntry1] : initial visit

## 2024-09-22 NOTE — PROCEDURE
[Saline] : saline [Fibrotic] : fibrotic [Scalpel] : scalpel [Skin] : skin [Fat] : fat [Fascia] : fascia [Sent to Lab] : which was entirely removed and was sent to the laboratory for [Culture and Sensitivity] : culture and sensitivity [Pressure] : pressure [FreeTextEntry1] : silver alginate and steril dressing  [FreeTextEntry9] : 6745 [de-identified] : left hallux with edema, erythema, purulent drainage, DFU3 down to bone  [de-identified] : Jone [FreeTextEntry6] : left hallux with edema, erythema, purulent drainage, DFU3 down to bone  [FreeTextEntry7] : left hallux with edema, erythema, purulent drainage, DFU3 down to bone

## 2024-09-22 NOTE — PHYSICAL EXAM
[de-identified] : 100% [de-identified] : 3x Weekly [4 x 4] : 4 x 4  [FreeTextEntry5] : post debridement measurements: 0.6 x 0.6 x 0.9 [de-identified] : post debridement measurement: 0.8 x 0.8 x 0.4 [1+] : left 1+ [2+] : left 2+ [Alert] : alert [Oriented to Person] : oriented to person [Oriented to Place] : oriented to place [de-identified] : Aox3, NAD  [de-identified] : 5/5 muscle strength for all muscles and tendons crossing the foot and ankle joints, ankle joint and STJ ROM intact b/l. No pain with calf compression b/l. [de-identified] : left hallux with edema, erythema, purulent drainage, DFU3 down to bone  [FreeTextEntry1] : Left plantar hallux-distal [FreeTextEntry2] : 0.2 [FreeTextEntry3] : 0.2 [FreeTextEntry4] : 0.8 to bone  [de-identified] : serous  [de-identified] : edema/erythema/callus/hot to touch [de-identified] : calcium alginate [de-identified] : Mechanically cleansed with Sterile gauze and 0.9% Normal Saline   Left Dorsal Pedal: nonpalpable, audible via doppler Left Posterior Tibialis: nonpalpable, audible via doppler  Right Dorsal Pedal: nonpalpable, audible via doppler Right Posterior Tibialis: nonpalpable, audible via doppler Cap refill <3 secs skin: erythema, warm, L hallux hot to touch, marked edema of the left foot Vascular studies will be done during this pts hospital stay   calcium alginate applied by DPM prior to ED transport and admission  Cultures obtained and sent to lab [FreeTextEntry7] : left plantar hallux- proximal [FreeTextEntry8] : 0.5 [FreeTextEntry9] : 0.5 [de-identified] : 0.3 [de-identified] : serous  [de-identified] : edema/erythema/callus/hot to touch [de-identified] : 100% [de-identified] : calcium alginate [de-identified] : Mechanically cleansed with Sterile gauze and 0.9% Normal Saline  calcium alginate applied by DPM prior to ED transfer and admission  [TWNoteComboBox4] : Small [TWNoteComboBox5] : No [TWNoteComboBox6] : Other [de-identified] : No [de-identified] : other [de-identified] : None [de-identified] : None [de-identified] : 100% [de-identified] : No [TWNoteComboBox7] : False [de-identified] : False [de-identified] : Primary Dressing [de-identified] : Small [de-identified] : No [de-identified] : Other [de-identified] : No [de-identified] : other [de-identified] : None [de-identified] : None [de-identified] : None [de-identified] : Yes [TWNoteComboBox8] : False [de-identified] : False

## 2024-09-22 NOTE — PHYSICAL EXAM
[de-identified] : 100% [de-identified] : 3x Weekly [4 x 4] : 4 x 4  [FreeTextEntry5] : post debridement measurements: 0.6 x 0.6 x 0.9 [de-identified] : post debridement measurement: 0.8 x 0.8 x 0.4 [1+] : left 1+ [2+] : left 2+ [Alert] : alert [Oriented to Person] : oriented to person [Oriented to Place] : oriented to place [de-identified] : Aox3, NAD  [de-identified] : 5/5 muscle strength for all muscles and tendons crossing the foot and ankle joints, ankle joint and STJ ROM intact b/l. No pain with calf compression b/l. [de-identified] : left hallux with edema, erythema, purulent drainage, DFU3 down to bone  [FreeTextEntry1] : Left plantar hallux-distal [FreeTextEntry2] : 0.2 [FreeTextEntry3] : 0.2 [FreeTextEntry4] : 0.8 to bone  [de-identified] : serous  [de-identified] : edema/erythema/callus/hot to touch [de-identified] : calcium alginate [de-identified] : Mechanically cleansed with Sterile gauze and 0.9% Normal Saline   Left Dorsal Pedal: nonpalpable, audible via doppler Left Posterior Tibialis: nonpalpable, audible via doppler  Right Dorsal Pedal: nonpalpable, audible via doppler Right Posterior Tibialis: nonpalpable, audible via doppler Cap refill <3 secs skin: erythema, warm, L hallux hot to touch, marked edema of the left foot Vascular studies will be done during this pts hospital stay   calcium alginate applied by DPM prior to ED transport and admission  Cultures obtained and sent to lab [FreeTextEntry7] : left plantar hallux- proximal [FreeTextEntry8] : 0.5 [FreeTextEntry9] : 0.5 [de-identified] : 0.3 [de-identified] : serous  [de-identified] : edema/erythema/callus/hot to touch [de-identified] : 100% [de-identified] : calcium alginate [de-identified] : Mechanically cleansed with Sterile gauze and 0.9% Normal Saline  calcium alginate applied by DPM prior to ED transfer and admission  [TWNoteComboBox4] : Small [TWNoteComboBox5] : No [TWNoteComboBox6] : Other [de-identified] : No [de-identified] : other [de-identified] : None [de-identified] : None [de-identified] : 100% [de-identified] : No [TWNoteComboBox7] : False [de-identified] : False [de-identified] : Primary Dressing [de-identified] : Small [de-identified] : No [de-identified] : Other [de-identified] : No [de-identified] : other [de-identified] : None [de-identified] : None [de-identified] : None [de-identified] : Yes [TWNoteComboBox8] : False [de-identified] : False

## 2024-09-22 NOTE — PHYSICAL EXAM
[de-identified] : 100% [de-identified] : 3x Weekly [4 x 4] : 4 x 4  [FreeTextEntry5] : post debridement measurements: 0.6 x 0.6 x 0.9 [de-identified] : post debridement measurement: 0.8 x 0.8 x 0.4 [1+] : left 1+ [2+] : left 2+ [Alert] : alert [Oriented to Person] : oriented to person [Oriented to Place] : oriented to place [de-identified] : Aox3, NAD  [de-identified] : 5/5 muscle strength for all muscles and tendons crossing the foot and ankle joints, ankle joint and STJ ROM intact b/l. No pain with calf compression b/l. [de-identified] : left hallux with edema, erythema, purulent drainage, DFU3 down to bone  [FreeTextEntry1] : Left plantar hallux-distal [FreeTextEntry2] : 0.2 [FreeTextEntry3] : 0.2 [FreeTextEntry4] : 0.8 to bone  [de-identified] : serous  [de-identified] : edema/erythema/callus/hot to touch [de-identified] : calcium alginate [de-identified] : Mechanically cleansed with Sterile gauze and 0.9% Normal Saline   Left Dorsal Pedal: nonpalpable, audible via doppler Left Posterior Tibialis: nonpalpable, audible via doppler  Right Dorsal Pedal: nonpalpable, audible via doppler Right Posterior Tibialis: nonpalpable, audible via doppler Cap refill <3 secs skin: erythema, warm, L hallux hot to touch, marked edema of the left foot Vascular studies will be done during this pts hospital stay   calcium alginate applied by DPM prior to ED transport and admission  Cultures obtained and sent to lab [FreeTextEntry7] : left plantar hallux- proximal [FreeTextEntry8] : 0.5 [FreeTextEntry9] : 0.5 [de-identified] : 0.3 [de-identified] : serous  [de-identified] : edema/erythema/callus/hot to touch [de-identified] : 100% [de-identified] : calcium alginate [de-identified] : Mechanically cleansed with Sterile gauze and 0.9% Normal Saline  calcium alginate applied by DPM prior to ED transfer and admission  [TWNoteComboBox4] : Small [TWNoteComboBox5] : No [TWNoteComboBox6] : Other [de-identified] : No [de-identified] : other [de-identified] : None [de-identified] : None [de-identified] : 100% [de-identified] : No [TWNoteComboBox7] : False [de-identified] : False [de-identified] : Primary Dressing [de-identified] : Small [de-identified] : No [de-identified] : Other [de-identified] : No [de-identified] : other [de-identified] : None [de-identified] : None [de-identified] : None [de-identified] : Yes [TWNoteComboBox8] : False [de-identified] : False

## 2024-09-22 NOTE — ASSESSMENT
[Verbal] : Verbal [Written] : Written [Demo] : Demo [Patient] : Patient [Spouse] : Spouse [Good - alert, interested, motivated] : Good - alert, interested, motivated [Demonstrates independently] : demonstrates independently [Dressing changes] : dressing changes [Foot Care] : foot care [Skin Care] : skin care [Pressure relief] : pressure relief [Signs and symptoms of infection] : sign and symptoms of infection [Arterial Disease] : arterial disease [How and When to Call] : how and when to call [Labs and Tests] : labs and tests [Pain Management] : pain management [Off-loading] : off-loading [Patient responsibility to plan of care] : patient responsibility to plan of care [Glycemic Control] : glycemic control [] : Yes [Stable] : stable [Home] : Home [Ambulatory] : Ambulatory [Not Applicable - Long Term Care/Home Health Agency] : Long Term Care/Home Health Agency: Not Applicable [Nutrition] : nutrition [Hospitalization] : hospitalization [FreeTextEntry2] : infection prevention promote skin integrity glycemic control demonstrates use of both pharmacological and non pharmacological pain management interventions foot care Hospitalization and discharge offloading/pressure reductions  [FreeTextEntry3] : initial visit  [FreeTextEntry4] : F/U after hospitalization discharge Pt last Hga1c was 9.4% done on 9/3/24, DPM aware. Pt wears a CGM device and reports improving daily BGL trends.  Pt admitted to [facility] ED for L hallux infection, pt to consult with ID and vascular, cultures sent to lab, pt to start IV abx. MRI, foot/chest xrays ordered.  Pt was escorted to the ED by this RN  Pt provided and surgical shoe, allowed to weight bear on the L heel as per DPM

## 2024-09-22 NOTE — HISTORY OF PRESENT ILLNESS
[FreeTextEntry1] : Pt is a 69 yr old M with a Hx of T2DM, HTN, HLD being seen for a left plantar hallux ulcer that occurred from this pt trying to trim a tree while away [place], this pt reports stepping on a pine needle the 2nd week of 7/24 and is unsure if all of the pine needle was removed. Pt reports 1 week after the incident. his toe began to swell and become erythematous. The pts spouse cousin who is a neuro MD lanced and drained the abscess, they are unsure of the foreign object was removed and he started the pt on PO Augmentin 500mg/595srt1 weeks and Flagyl 500mg x 10 days. The swelling around the foot had appeared to be improving overtime but the great toe still appeared discolored, [place] On 9/3 they saw Dr. Bruno who advised this pt to follow up with wound care and started him on Cefproxil 500mg due suspected infection and ulceration.

## 2024-09-22 NOTE — ASSESSMENT
[Verbal] : Verbal [Written] : Written [Demo] : Demo [Patient] : Patient [Spouse] : Spouse [Good - alert, interested, motivated] : Good - alert, interested, motivated [Demonstrates independently] : demonstrates independently [Dressing changes] : dressing changes [Foot Care] : foot care [Skin Care] : skin care [Pressure relief] : pressure relief [Signs and symptoms of infection] : sign and symptoms of infection [Arterial Disease] : arterial disease [How and When to Call] : how and when to call [Labs and Tests] : labs and tests [Pain Management] : pain management [Off-loading] : off-loading [Patient responsibility to plan of care] : patient responsibility to plan of care [Glycemic Control] : glycemic control [] : Yes [Stable] : stable [Home] : Home [Ambulatory] : Ambulatory [Not Applicable - Long Term Care/Home Health Agency] : Long Term Care/Home Health Agency: Not Applicable [Nutrition] : nutrition [Hospitalization] : hospitalization [FreeTextEntry2] : infection prevention promote skin integrity glycemic control demonstrates use of both pharmacological and non pharmacological pain management interventions foot care Hospitalization and discharge offloading/pressure reductions  [FreeTextEntry3] : initial visit  [FreeTextEntry4] : F/U after hospitalization discharge Pt last Hga1c was 9.4% done on 9/3/24, DPM aware. Pt wears a CGM device and reports improving daily BGL trends.  Pt admitted to [facility] ED for L hallux infection, pt to consult with ID and vascular, cultures sent to lab, pt to start IV abx. MRI, foot/chest xrays ordered.  Pt was escorted to the ED by this RN  Pt provided and surgical shoe, allowed to weight bear on the L heel as per DPM

## 2024-09-22 NOTE — ASSESSMENT
[Verbal] : Verbal [Written] : Written [Demo] : Demo [Patient] : Patient [Spouse] : Spouse [Good - alert, interested, motivated] : Good - alert, interested, motivated [Demonstrates independently] : demonstrates independently [Dressing changes] : dressing changes [Foot Care] : foot care [Skin Care] : skin care [Pressure relief] : pressure relief [Signs and symptoms of infection] : sign and symptoms of infection [Arterial Disease] : arterial disease [How and When to Call] : how and when to call [Labs and Tests] : labs and tests [Pain Management] : pain management [Off-loading] : off-loading [Patient responsibility to plan of care] : patient responsibility to plan of care [Glycemic Control] : glycemic control [] : Yes [Stable] : stable [Home] : Home [Ambulatory] : Ambulatory [Not Applicable - Long Term Care/Home Health Agency] : Long Term Care/Home Health Agency: Not Applicable [Nutrition] : nutrition [Hospitalization] : hospitalization [FreeTextEntry2] : infection prevention promote skin integrity glycemic control demonstrates use of both pharmacological and non pharmacological pain management interventions foot care Hospitalization and discharge offloading/pressure reductions  [FreeTextEntry3] : initial visit  [FreeTextEntry4] : F/U after hospitalization discharge Pt last Hga1c was 9.4% done on 9/3/24, DPM aware. Pt wears a CGM device and reports improving daily BGL trends.  Pt admitted to hospitals ED for L hallux infection, pt to consult with ID and vascular, cultures sent to lab, pt to start IV abx. MRI, foot/chest xrays ordered.  Pt was escorted to the ED by this RN  Pt provided and surgical shoe, allowed to weight bear on the L heel as per DPM

## 2024-09-22 NOTE — HISTORY OF PRESENT ILLNESS
[FreeTextEntry1] : Pt is a 69 yr old M with a Hx of T2DM, HTN, HLD being seen for a left plantar hallux ulcer that occurred from this pt trying to trim a tree while away [place], this pt reports stepping on a pine needle the 2nd week of 7/24 and is unsure if all of the pine needle was removed. Pt reports 1 week after the incident. his toe began to swell and become erythematous. The pts spouse cousin who is a neuro MD lanced and drained the abscess, they are unsure of the foreign object was removed and he started the pt on PO Augmentin 500mg/281zlp6 weeks and Flagyl 500mg x 10 days. The swelling around the foot had appeared to be improving overtime but the great toe still appeared discolored, the [place] the states [place]. On 9/3 they saw Dr. Bruno who advised this pt to follow up with wound care and started him on Cefproxil 500mg due suspected infection and ulceration.

## 2024-09-22 NOTE — HISTORY OF PRESENT ILLNESS
[FreeTextEntry1] : Pt is a 69 yr old M with a Hx of T2DM, HTN, HLD being seen for a left plantar hallux ulcer that occurred from this pt trying to trim a tree while away [place], this pt reports stepping on a pine needle the 2nd week of 7/24 and is unsure if all of the pine needle was removed. Pt reports 1 week after the incident. his toe began to swell and become erythematous. The pts spouse cousin who is a neuro MD lanced and drained the abscess, they are unsure of the foreign object was removed and he started the pt on PO Augmentin 500mg/562igg5 weeks and Flagyl 500mg x 10 days. The swelling around the foot had appeared to be improving overtime but the great toe still appeared discolored, the [place]. On 9/3 they saw Dr. Bruno who advised this pt to follow up with wound care and started him on Cefproxil 500mg due suspected infection and ulceration.

## 2024-09-22 NOTE — REVIEW OF SYSTEMS
[FreeTextEntry1] : 6687 [Chest Pain] : no chest pain [Skin Wound] : skin wound [Negative] : Psychiatric [de-identified] : Type II diabetes

## 2024-09-22 NOTE — ASSESSMENT
[Verbal] : Verbal [Written] : Written [Demo] : Demo [Patient] : Patient [Spouse] : Spouse [Good - alert, interested, motivated] : Good - alert, interested, motivated [Demonstrates independently] : demonstrates independently [Dressing changes] : dressing changes [Foot Care] : foot care [Skin Care] : skin care [Pressure relief] : pressure relief [Signs and symptoms of infection] : sign and symptoms of infection [Arterial Disease] : arterial disease [How and When to Call] : how and when to call [Labs and Tests] : labs and tests [Pain Management] : pain management [Off-loading] : off-loading [Patient responsibility to plan of care] : patient responsibility to plan of care [Glycemic Control] : glycemic control [] : Yes [Stable] : stable [Home] : Home [Ambulatory] : Ambulatory [Not Applicable - Long Term Care/Home Health Agency] : Long Term Care/Home Health Agency: Not Applicable [Nutrition] : nutrition [Hospitalization] : hospitalization [FreeTextEntry2] : infection prevention promote skin integrity glycemic control demonstrates use of both pharmacological and non pharmacological pain management interventions foot care Hospitalization and discharge offloading/pressure reductions  [FreeTextEntry3] : initial visit  [FreeTextEntry4] : F/U after hospitalization discharge Pt last Hga1c was 9.4% done on 9/3/24, DPM aware. Pt wears a CGM device and reports improving daily BGL trends.  Pt admitted to [facility] ED for L hallux infection, pt to consult with ID and vascular, cultures sent to lab, pt to start IV abx. MRI, foot/chest xrays ordered.  Pt was escorted to the ED by this RN  Pt provided and surgical shoe, allowed to weight bear on the L heel as per DPM

## 2024-09-22 NOTE — HISTORY OF PRESENT ILLNESS
[FreeTextEntry1] : Pt is a 69 yr old M with a Hx of T2DM, HTN, HLD being seen for a left plantar hallux ulcer that occurred from this pt trying to trim a tree while away [place], this pt reports stepping on a pine needle the 2nd week of 7/24 and is unsure if all of the pine needle was removed. Pt reports 1 week after the incident. his toe began to swell and become erythematous. The pts spouse cousin who is a neuro MD lanced and drained the abscess, they are unsure of the foreign object was removed and he started the pt on PO Augmentin 500mg/123dox6 weeks and Flagyl 500mg x 10 days. The swelling around the foot had appeared to be improving overtime but the great toe still appeared discolored, the [place]. On 9/3 they saw Dr. Bruno who advised this pt to follow up with wound care and started him on Cefproxil 500mg due suspected infection and ulceration.

## 2024-09-23 DIAGNOSIS — Z79.899 OTHER LONG TERM (CURRENT) DRUG THERAPY: ICD-10-CM

## 2024-09-23 DIAGNOSIS — E11.621 TYPE 2 DIABETES MELLITUS WITH FOOT ULCER: ICD-10-CM

## 2024-09-23 DIAGNOSIS — Z79.84 LONG TERM (CURRENT) USE OF ORAL HYPOGLYCEMIC DRUGS: ICD-10-CM

## 2024-09-23 DIAGNOSIS — Z80.8 FAMILY HISTORY OF MALIGNANT NEOPLASM OF OTHER ORGANS OR SYSTEMS: ICD-10-CM

## 2024-09-23 DIAGNOSIS — Z87.19 PERSONAL HISTORY OF OTHER DISEASES OF THE DIGESTIVE SYSTEM: ICD-10-CM

## 2024-09-23 DIAGNOSIS — Z98.49 CATARACT EXTRACTION STATUS, UNSPECIFIED EYE: ICD-10-CM

## 2024-09-23 DIAGNOSIS — E78.5 HYPERLIPIDEMIA, UNSPECIFIED: ICD-10-CM

## 2024-09-23 DIAGNOSIS — Z87.891 PERSONAL HISTORY OF NICOTINE DEPENDENCE: ICD-10-CM

## 2024-09-23 DIAGNOSIS — Z79.82 LONG TERM (CURRENT) USE OF ASPIRIN: ICD-10-CM

## 2024-09-23 DIAGNOSIS — Z79.4 LONG TERM (CURRENT) USE OF INSULIN: ICD-10-CM

## 2024-09-23 DIAGNOSIS — Z83.3 FAMILY HISTORY OF DIABETES MELLITUS: ICD-10-CM

## 2024-09-23 DIAGNOSIS — I10 ESSENTIAL (PRIMARY) HYPERTENSION: ICD-10-CM

## 2024-09-23 DIAGNOSIS — L97.526 NON-PRESSURE CHRONIC ULCER OF OTHER PART OF LEFT FOOT WITH BONE INVOLVEMENT WITHOUT EVIDENCE OF NECROSIS: ICD-10-CM

## 2024-09-23 DIAGNOSIS — D64.9 ANEMIA, UNSPECIFIED: ICD-10-CM

## 2024-09-23 NOTE — PHYSICAL EXAM
[4 x 4] : 4 x 4  [1+] : left 1+ [2+] : left 2+ [Alert] : alert [Oriented to Person] : oriented to person [Oriented to Place] : oriented to place [de-identified] : Aox3, NAD  [de-identified] : 5/5 muscle strength for all muscles and tendons crossing the foot and ankle joints, ankle joint and STJ ROM intact b/l. No pain with calf compression b/l. [de-identified] : left hallux with mild edema, no erythema,  DFU3 down to bone  [FreeTextEntry1] : Left plantar hallux - distal  [FreeTextEntry2] : 0.5 [FreeTextEntry3] : 0.2 [FreeTextEntry4] : 1.0 to bone  [de-identified] : Scant Serous/sanguinous [de-identified] : Callus  [de-identified] : Silver alginate [de-identified] : Mechanically cleansed with sterile gauze and normal saline. Kerlix  [FreeTextEntry7] : left plantar hallux - proximal  [FreeTextEntry8] : 0.8 [FreeTextEntry9] : 0.5 [de-identified] : 0.3 [de-identified] : Serous/sanguinous [de-identified] : callus  [de-identified] : Silver alginate [de-identified] : Mechanically cleansed with sterile gauze and normal saline. Kerlix  [de-identified] : Left dorsal hallux bone biopsy site - sutures intact  [de-identified] : Dry dressing  [de-identified] : Mechanically cleansed with sterile gauze and normal saline. Kerlix  [de-identified] : other [de-identified] : None [de-identified] : None [de-identified] : >75% [de-identified] : No [de-identified] : 3x Weekly [de-identified] : Primary Dressing [de-identified] : Small [de-identified] : other [de-identified] : None [de-identified] : None [de-identified] : >75% [de-identified] : No [de-identified] : 3x Weekly [de-identified] : Primary Dressing [de-identified] : None [de-identified] : Normal [de-identified] : None [de-identified] : None [de-identified] : No [de-identified] : 3x Weekly [de-identified] : Primary Dressing

## 2024-09-23 NOTE — ASSESSMENT
[Verbal] : Verbal [Written] : Written [Demo] : Demo [Patient] : Patient [Family member] : Family member [Good - alert, interested, motivated] : Good - alert, interested, motivated [Verbalizes knowledge/Understanding] : Verbalizes knowledge/understanding [Dressing changes] : dressing changes [Foot Care] : foot care [Skin Care] : skin care [Signs and symptoms of infection] : sign and symptoms of infection [Surgery] : surgery [Nutrition] : nutrition [How and When to Call] : how and when to call [Pain Management] : pain management [Off-loading] : off-loading [Patient responsibility to plan of care] : patient responsibility to plan of care [Glycemic Control] : glycemic control [Stable] : stable [Home] : Home [Ambulatory] : Ambulatory [Faxed - Long Term Care/Home Health Agency] : Long Term Care/Home Health Agency: Faxed [] : No [FreeTextEntry2] : Infection prevention Localized wound care  Goal remaining pain free regarding wounds Offloading  Low glycemic diet   [FreeTextEntry3] : Patient underwent bone biopsy.  [FreeTextEntry4] : pt was admitted to [facility] 9/11/24 - 9/17/24 for infection to left hallux. Bone biopsy was performed. Pt DC'd on Ceftriaxone via Picc line.  supply script faxed along with HC instructions  Follow up in 1 week   [FreeTextEntry1] : [facility]

## 2024-09-23 NOTE — ASSESSMENT
[Verbal] : Verbal [Written] : Written [Demo] : Demo [Patient] : Patient [Family member] : Family member [Good - alert, interested, motivated] : Good - alert, interested, motivated [Verbalizes knowledge/Understanding] : Verbalizes knowledge/understanding [Dressing changes] : dressing changes [Foot Care] : foot care [Skin Care] : skin care [Signs and symptoms of infection] : sign and symptoms of infection [Surgery] : surgery [Nutrition] : nutrition [How and When to Call] : how and when to call [Pain Management] : pain management [Off-loading] : off-loading [Patient responsibility to plan of care] : patient responsibility to plan of care [Glycemic Control] : glycemic control [Stable] : stable [Home] : Home [Ambulatory] : Ambulatory [Faxed - Long Term Care/Home Health Agency] : Long Term Care/Home Health Agency: Faxed [] : No [FreeTextEntry2] : Infection prevention Localized wound care  Goal remaining pain free regarding wounds Offloading  Low glycemic diet   [FreeTextEntry3] : Patient underwent bone biopsy.  [FreeTextEntry4] : pt was admitted to [facility] 9/11/24 - 9/17/24 for infection to left hallux. Bone biopsy was performed. Pt DC'd on Ceftriaxone via Picc line.  supply script faxed along with HC instructions  Follow up in 1 week   [FreeTextEntry1] : [facility]

## 2024-09-23 NOTE — ASSESSMENT
[Verbal] : Verbal [Written] : Written [Demo] : Demo [Patient] : Patient [Family member] : Family member [Good - alert, interested, motivated] : Good - alert, interested, motivated [Verbalizes knowledge/Understanding] : Verbalizes knowledge/understanding [Dressing changes] : dressing changes [Foot Care] : foot care [Skin Care] : skin care [Signs and symptoms of infection] : sign and symptoms of infection [Surgery] : surgery [Nutrition] : nutrition [How and When to Call] : how and when to call [Pain Management] : pain management [Off-loading] : off-loading [Patient responsibility to plan of care] : patient responsibility to plan of care [Glycemic Control] : glycemic control [Stable] : stable [Home] : Home [Ambulatory] : Ambulatory [Faxed - Long Term Care/Home Health Agency] : Long Term Care/Home Health Agency: Faxed [] : No [FreeTextEntry2] : Infection prevention Localized wound care  Goal remaining pain free regarding wounds Offloading  Low glycemic diet   [FreeTextEntry3] : Patient underwent bone biopsy.  [FreeTextEntry4] : pt was admitted to [facility] 9/11/24 - 9/17/24 for infection to left hallux. Bone biopsy was performed. Pt DC'd on Ceftriaxone via Picc line.  supply script faxed along with HC instructions  Follow up in 1 week   [FreeTextEntry1] : [facility]

## 2024-09-23 NOTE — HISTORY OF PRESENT ILLNESS
[FreeTextEntry1] : Patient is 69 year old male presenting for f/u s/p debridement of the left great toe down to bone. Patient was d/c from recent hospitalization and is on PICC line abx. Patient accompanied by spouse.

## 2024-09-23 NOTE — PHYSICAL EXAM
[4 x 4] : 4 x 4  [1+] : left 1+ [2+] : left 2+ [Alert] : alert [Oriented to Person] : oriented to person [Oriented to Place] : oriented to place [de-identified] : Aox3, NAD  [de-identified] : 5/5 muscle strength for all muscles and tendons crossing the foot and ankle joints, ankle joint and STJ ROM intact b/l. No pain with calf compression b/l. [de-identified] : left hallux with mild edema, no erythema,  DFU3 down to bone  [FreeTextEntry1] : Left plantar hallux - distal  [FreeTextEntry2] : 0.5 [FreeTextEntry3] : 0.2 [FreeTextEntry4] : 1.0 to bone  [de-identified] : Scant Serous/sanguinous [de-identified] : Callus  [de-identified] : Silver alginate [de-identified] : Mechanically cleansed with sterile gauze and normal saline. Kerlix  [FreeTextEntry7] : left plantar hallux - proximal  [FreeTextEntry8] : 0.8 [FreeTextEntry9] : 0.5 [de-identified] : 0.3 [de-identified] : Serous/sanguinous [de-identified] : callus  [de-identified] : Silver alginate [de-identified] : Mechanically cleansed with sterile gauze and normal saline. Kerlix  [de-identified] : Left dorsal hallux bone biopsy site - sutures intact  [de-identified] : Dry dressing  [de-identified] : Mechanically cleansed with sterile gauze and normal saline. Kerlix  [de-identified] : other [de-identified] : None [de-identified] : None [de-identified] : >75% [de-identified] : No [de-identified] : 3x Weekly [de-identified] : Primary Dressing [de-identified] : Small [de-identified] : other [de-identified] : None [de-identified] : None [de-identified] : >75% [de-identified] : No [de-identified] : 3x Weekly [de-identified] : Primary Dressing [de-identified] : None [de-identified] : Normal [de-identified] : None [de-identified] : None [de-identified] : No [de-identified] : 3x Weekly [de-identified] : Primary Dressing

## 2024-09-23 NOTE — PHYSICAL EXAM
[4 x 4] : 4 x 4  [1+] : left 1+ [2+] : left 2+ [Alert] : alert [Oriented to Person] : oriented to person [Oriented to Place] : oriented to place [de-identified] : Aox3, NAD  [de-identified] : 5/5 muscle strength for all muscles and tendons crossing the foot and ankle joints, ankle joint and STJ ROM intact b/l. No pain with calf compression b/l. [de-identified] : left hallux with mild edema, no erythema,  DFU3 down to bone  [FreeTextEntry1] : Left plantar hallux - distal  [FreeTextEntry2] : 0.5 [FreeTextEntry3] : 0.2 [FreeTextEntry4] : 1.0 to bone  [de-identified] : Scant Serous/sanguinous [de-identified] : Callus  [de-identified] : Silver alginate [de-identified] : Mechanically cleansed with sterile gauze and normal saline. Kerlix  [FreeTextEntry7] : left plantar hallux - proximal  [FreeTextEntry8] : 0.8 [FreeTextEntry9] : 0.5 [de-identified] : 0.3 [de-identified] : Serous/sanguinous [de-identified] : callus  [de-identified] : Silver alginate [de-identified] : Mechanically cleansed with sterile gauze and normal saline. Kerlix  [de-identified] : Left dorsal hallux bone biopsy site - sutures intact  [de-identified] : Dry dressing  [de-identified] : Mechanically cleansed with sterile gauze and normal saline. Kerlix  [de-identified] : other [de-identified] : None [de-identified] : None [de-identified] : >75% [de-identified] : No [de-identified] : 3x Weekly [de-identified] : Primary Dressing [de-identified] : Small [de-identified] : other [de-identified] : None [de-identified] : None [de-identified] : >75% [de-identified] : No [de-identified] : 3x Weekly [de-identified] : Primary Dressing [de-identified] : None [de-identified] : Normal [de-identified] : None [de-identified] : None [de-identified] : No [de-identified] : 3x Weekly [de-identified] : Primary Dressing

## 2024-09-23 NOTE — PLAN
[FreeTextEntry1] : .Patient seen and evaluated. Discussed etiology and treatment plan. Patient verbalized understanding. C/w local wound care and offloading. Patient will benefit from HBOT for DFU3 to help with healing process. RTc in one week. C/w Picc line abx as per ID. Spent 20 minutes for patient care and medical decision making.

## 2024-09-23 NOTE — REVIEW OF SYSTEMS
[Chest Pain] : no chest pain [Skin Wound] : skin wound [Negative] : Psychiatric [de-identified] : Type II diabetes

## 2024-09-23 NOTE — REVIEW OF SYSTEMS
[Chest Pain] : no chest pain [Skin Wound] : skin wound [Negative] : Psychiatric [de-identified] : Type II diabetes

## 2024-09-23 NOTE — REVIEW OF SYSTEMS
[Chest Pain] : no chest pain [Skin Wound] : skin wound [Negative] : Psychiatric [de-identified] : Type II diabetes

## 2024-09-30 ENCOUNTER — OUTPATIENT (OUTPATIENT)
Dept: OUTPATIENT SERVICES | Facility: HOSPITAL | Age: 69
LOS: 1 days | Discharge: HOME | End: 2024-09-30
Payer: MEDICARE

## 2024-09-30 ENCOUNTER — APPOINTMENT (OUTPATIENT)
Dept: WOUND CARE | Facility: HOSPITAL | Age: 69
End: 2024-09-30
Payer: MEDICARE

## 2024-09-30 VITALS
SYSTOLIC BLOOD PRESSURE: 121 MMHG | DIASTOLIC BLOOD PRESSURE: 76 MMHG | OXYGEN SATURATION: 95 % | RESPIRATION RATE: 18 BRPM | BODY MASS INDEX: 22.35 KG/M2 | HEIGHT: 72 IN | WEIGHT: 165 LBS | HEART RATE: 77 BPM | TEMPERATURE: 99.4 F

## 2024-09-30 DIAGNOSIS — L97.501 NON-PRESSURE CHRONIC ULCER OF OTHER PART OF UNSPECIFIED FOOT LIMITED TO BREAKDOWN OF SKIN: ICD-10-CM

## 2024-09-30 PROBLEM — L97.526 NON-PRESSURE CHRONIC ULCER OF OTHER PART OF LEFT FOOT WITH BONE INVOLVEMENT WITHOUT EVIDENCE OF NECROSIS: Status: ACTIVE | Noted: 2024-09-30

## 2024-09-30 PROCEDURE — 99213 OFFICE O/P EST LOW 20 MIN: CPT

## 2024-09-30 PROCEDURE — G0463: CPT

## 2024-09-30 NOTE — PHYSICAL EXAM
[4 x 4] : 4 x 4  [1+] : left 1+ [2+] : left 2+ [Alert] : alert [Oriented to Person] : oriented to person [Oriented to Place] : oriented to place [de-identified] : Aox3, NAD  [de-identified] : 5/5 muscle strength for all muscles and tendons crossing the foot and ankle joints, ankle joint and STJ ROM intact b/l. No pain with calf compression b/l. [de-identified] : left hallux DFU3 down to bone, bone biopsy site healed with intact sutures [FreeTextEntry1] : Left plantar hallux - distal  [FreeTextEntry2] : 1.0 [FreeTextEntry3] : 1.0 [FreeTextEntry4] : 0.2 [de-identified] : Callus  [de-identified] :  Silver Alginate [de-identified] : Mechanically cleansed with sterile gauze and normal saline. Kerlix  [FreeTextEntry7] : left plantar hallux - proximal  [FreeTextEntry8] : 0.2 [FreeTextEntry9] : 0.2 [de-identified] : 0.1 [de-identified] : Serous/sanguinous [de-identified] : callus  [de-identified] : Silver alginate [de-identified] : Mechanically cleansed with sterile gauze and normal saline. Kerlix  [de-identified] : Left dorsal hallux bone biopsy site - sutures intact  [de-identified] : CLOSED [de-identified] : Dry dressing  [de-identified] : Mechanically cleansed with sterile gauze and normal saline. Kerlix   Removed sutures [TWNoteComboBox4] : None [TWNoteComboBox5] : No [TWNoteComboBox6] : Diabetic [de-identified] : No [de-identified] : other [de-identified] : None [de-identified] : None [de-identified] : None [de-identified] : No [de-identified] : 3x Weekly [de-identified] : Primary Dressing [de-identified] : Small [de-identified] : No [de-identified] : Diabetic [de-identified] : No [de-identified] : other [de-identified] : None [de-identified] : None [de-identified] : >75% [de-identified] : No [de-identified] : 3x Weekly [de-identified] : Primary Dressing [de-identified] : None [de-identified] : No [de-identified] : Surgical [de-identified] : No [de-identified] : Normal [de-identified] : None [de-identified] : None [de-identified] : None [de-identified] : No [de-identified] : 3x Weekly

## 2024-09-30 NOTE — ASSESSMENT
[Verbal] : Verbal [Written] : Written [Demo] : Demo [Patient] : Patient [Family member] : Family member [Good - alert, interested, motivated] : Good - alert, interested, motivated [Verbalizes knowledge/Understanding] : Verbalizes knowledge/understanding [Dressing changes] : dressing changes [Foot Care] : foot care [Skin Care] : skin care [Signs and symptoms of infection] : sign and symptoms of infection [Surgery] : surgery [Nutrition] : nutrition [How and When to Call] : how and when to call [Pain Management] : pain management [Off-loading] : off-loading [Home Health] : home health [Patient responsibility to plan of care] : patient responsibility to plan of care [Glycemic Control] : glycemic control [] : Yes [Stable] : stable [Home] : Home [Ambulatory] : Ambulatory [Faxed - Long Term Care/Home Health Agency] : Long Term Care/Home Health Agency: Faxed [FreeTextEntry2] : Infection prevention Localized wound care  Goal remaining pain free regarding wounds Offloading  Low glycemic diet  [FreeTextEntry4] : Patient has [facility],  instructions and supply order faxed to [facility] and provided to patient. PICC line LALI, ID consult placed for Dr. Ojeda to F/U at [facility] DPM Discussed surgical intervention if the wound does not heal. F/U 1 week [FreeTextEntry1] : [facility]

## 2024-09-30 NOTE — VITALS
[Pain related to present condition?] : The patient's  pain is not related to present condition. [] : No [de-identified] : patient has no pain related to wounds

## 2024-09-30 NOTE — PHYSICAL EXAM
[4 x 4] : 4 x 4  [1+] : left 1+ [2+] : left 2+ [Alert] : alert [Oriented to Person] : oriented to person [Oriented to Place] : oriented to place [de-identified] : Aox3, NAD  [de-identified] : 5/5 muscle strength for all muscles and tendons crossing the foot and ankle joints, ankle joint and STJ ROM intact b/l. No pain with calf compression b/l. [de-identified] : left hallux DFU3 down to bone, bone biopsy site healed with intact sutures [FreeTextEntry1] : Left plantar hallux - distal  [FreeTextEntry2] : 1.0 [FreeTextEntry3] : 1.0 [FreeTextEntry4] : 0.2 [de-identified] : Callus  [de-identified] :  Silver Alginate [de-identified] : Mechanically cleansed with sterile gauze and normal saline. Kerlix  [FreeTextEntry7] : left plantar hallux - proximal  [FreeTextEntry8] : 0.2 [FreeTextEntry9] : 0.2 [de-identified] : 0.1 [de-identified] : Serous/sanguinous [de-identified] : callus  [de-identified] : Silver alginate [de-identified] : Mechanically cleansed with sterile gauze and normal saline. Kerlix  [de-identified] : Left dorsal hallux bone biopsy site - sutures intact  [de-identified] : CLOSED [de-identified] : Dry dressing  [de-identified] : Mechanically cleansed with sterile gauze and normal saline. Kerlix   Removed sutures [TWNoteComboBox4] : None [TWNoteComboBox5] : No [TWNoteComboBox6] : Diabetic [de-identified] : No [de-identified] : other [de-identified] : None [de-identified] : None [de-identified] : None [de-identified] : No [de-identified] : 3x Weekly [de-identified] : Primary Dressing [de-identified] : Small [de-identified] : No [de-identified] : Diabetic [de-identified] : No [de-identified] : other [de-identified] : None [de-identified] : None [de-identified] : >75% [de-identified] : No [de-identified] : 3x Weekly [de-identified] : Primary Dressing [de-identified] : None [de-identified] : No [de-identified] : Surgical [de-identified] : No [de-identified] : Normal [de-identified] : None [de-identified] : None [de-identified] : None [de-identified] : No [de-identified] : 3x Weekly

## 2024-09-30 NOTE — REVIEW OF SYSTEMS
[Chest Pain] : no chest pain [Skin Wound] : skin wound [Negative] : Psychiatric [de-identified] : Type II diabetes

## 2024-09-30 NOTE — HISTORY OF PRESENT ILLNESS
[FreeTextEntry1] : Patient is 69 year old male presenting for f/u s/p debridement of the left great toe down to bone. Patient was d/c from recent hospitalization and is on PICC line abx. Patient accompanied by spouse.   9/30/24 patient seen status post debridement of the left plantar hallux, currently on a PICC line for IV antibiotics.

## 2024-09-30 NOTE — VITALS
[Pain related to present condition?] : The patient's  pain is not related to present condition. [] : No [de-identified] : patient has no pain related to wounds

## 2024-09-30 NOTE — PLAN
[FreeTextEntry1] : .Patient seen and evaluated. Discussed etiology and treatment plan. Patient verbalized understanding. C/w local wound care and offloading. Patient will benefit from HBOT for DFU3 to help with healing process. sutures removed at this time. RTc in one week. C/w Picc line abx as per ID. Spent 20 minutes for patient care and medical decision making.

## 2024-09-30 NOTE — ASSESSMENT
[Verbal] : Verbal [Written] : Written [Demo] : Demo [Patient] : Patient [Family member] : Family member [Good - alert, interested, motivated] : Good - alert, interested, motivated [Verbalizes knowledge/Understanding] : Verbalizes knowledge/understanding [Dressing changes] : dressing changes [Foot Care] : foot care [Skin Care] : skin care [Signs and symptoms of infection] : sign and symptoms of infection [Surgery] : surgery [Nutrition] : nutrition [How and When to Call] : how and when to call [Pain Management] : pain management [Off-loading] : off-loading [Home Health] : home health [Patient responsibility to plan of care] : patient responsibility to plan of care [Glycemic Control] : glycemic control [] : Yes [Stable] : stable [Home] : Home [Ambulatory] : Ambulatory [Faxed - Long Term Care/Home Health Agency] : Long Term Care/Home Health Agency: Faxed [FreeTextEntry2] : Infection prevention Localized wound care  Goal remaining pain free regarding wounds Offloading  Low glycemic diet  [FreeTextEntry4] : Patient has [facility],  instructions and supply order faxed to [facility] and provided to patient. PICC line LALI, ID consult placed for Dr. Ojeda to F/U at [facility] DPM Discussed surgical intervention if the wound does not heal. F/U 1 week [FreeTextEntry1] : [facility]

## 2024-09-30 NOTE — REVIEW OF SYSTEMS
[Chest Pain] : no chest pain [Skin Wound] : skin wound [Negative] : Psychiatric [de-identified] : Type II diabetes

## 2024-10-02 DIAGNOSIS — Z83.3 FAMILY HISTORY OF DIABETES MELLITUS: ICD-10-CM

## 2024-10-02 DIAGNOSIS — E11.621 TYPE 2 DIABETES MELLITUS WITH FOOT ULCER: ICD-10-CM

## 2024-10-02 DIAGNOSIS — Z80.8 FAMILY HISTORY OF MALIGNANT NEOPLASM OF OTHER ORGANS OR SYSTEMS: ICD-10-CM

## 2024-10-02 DIAGNOSIS — Z79.82 LONG TERM (CURRENT) USE OF ASPIRIN: ICD-10-CM

## 2024-10-02 DIAGNOSIS — E78.5 HYPERLIPIDEMIA, UNSPECIFIED: ICD-10-CM

## 2024-10-02 DIAGNOSIS — Z79.84 LONG TERM (CURRENT) USE OF ORAL HYPOGLYCEMIC DRUGS: ICD-10-CM

## 2024-10-02 DIAGNOSIS — Z87.891 PERSONAL HISTORY OF NICOTINE DEPENDENCE: ICD-10-CM

## 2024-10-02 DIAGNOSIS — Z98.49 CATARACT EXTRACTION STATUS, UNSPECIFIED EYE: ICD-10-CM

## 2024-10-02 DIAGNOSIS — Z87.19 PERSONAL HISTORY OF OTHER DISEASES OF THE DIGESTIVE SYSTEM: ICD-10-CM

## 2024-10-02 DIAGNOSIS — Z79.899 OTHER LONG TERM (CURRENT) DRUG THERAPY: ICD-10-CM

## 2024-10-02 DIAGNOSIS — Z79.4 LONG TERM (CURRENT) USE OF INSULIN: ICD-10-CM

## 2024-10-02 DIAGNOSIS — L97.526 NON-PRESSURE CHRONIC ULCER OF OTHER PART OF LEFT FOOT WITH BONE INVOLVEMENT WITHOUT EVIDENCE OF NECROSIS: ICD-10-CM

## 2024-10-02 DIAGNOSIS — D64.9 ANEMIA, UNSPECIFIED: ICD-10-CM

## 2024-10-02 DIAGNOSIS — I10 ESSENTIAL (PRIMARY) HYPERTENSION: ICD-10-CM

## 2024-10-07 ENCOUNTER — APPOINTMENT (OUTPATIENT)
Dept: WOUND CARE | Facility: HOSPITAL | Age: 69
End: 2024-10-07
Payer: MEDICARE

## 2024-10-07 ENCOUNTER — OUTPATIENT (OUTPATIENT)
Dept: OUTPATIENT SERVICES | Facility: HOSPITAL | Age: 69
LOS: 1 days | Discharge: HOME | End: 2024-10-07
Payer: MEDICARE

## 2024-10-07 VITALS
BODY MASS INDEX: 22.35 KG/M2 | HEIGHT: 72 IN | OXYGEN SATURATION: 96 % | RESPIRATION RATE: 16 BRPM | DIASTOLIC BLOOD PRESSURE: 77 MMHG | SYSTOLIC BLOOD PRESSURE: 121 MMHG | HEART RATE: 78 BPM | WEIGHT: 165 LBS | TEMPERATURE: 99.3 F

## 2024-10-07 DIAGNOSIS — L97.526 NON-PRESSURE CHRONIC ULCER OF OTHER PART OF LEFT FOOT WITH BONE INVOLVEMENT WITHOUT EVIDENCE OF NECROSIS: ICD-10-CM

## 2024-10-07 DIAGNOSIS — L97.509 TYPE 2 DIABETES MELLITUS WITH FOOT ULCER: ICD-10-CM

## 2024-10-07 DIAGNOSIS — L97.501 NON-PRESSURE CHRONIC ULCER OF OTHER PART OF UNSPECIFIED FOOT LIMITED TO BREAKDOWN OF SKIN: ICD-10-CM

## 2024-10-07 DIAGNOSIS — E11.621 TYPE 2 DIABETES MELLITUS WITH FOOT ULCER: ICD-10-CM

## 2024-10-07 PROCEDURE — G0463: CPT

## 2024-10-07 PROCEDURE — 99213 OFFICE O/P EST LOW 20 MIN: CPT

## 2024-10-08 DIAGNOSIS — D64.9 ANEMIA, UNSPECIFIED: ICD-10-CM

## 2024-10-08 DIAGNOSIS — Z87.19 PERSONAL HISTORY OF OTHER DISEASES OF THE DIGESTIVE SYSTEM: ICD-10-CM

## 2024-10-08 DIAGNOSIS — I10 ESSENTIAL (PRIMARY) HYPERTENSION: ICD-10-CM

## 2024-10-08 DIAGNOSIS — Z79.84 LONG TERM (CURRENT) USE OF ORAL HYPOGLYCEMIC DRUGS: ICD-10-CM

## 2024-10-08 DIAGNOSIS — Z79.899 OTHER LONG TERM (CURRENT) DRUG THERAPY: ICD-10-CM

## 2024-10-08 DIAGNOSIS — E11.621 TYPE 2 DIABETES MELLITUS WITH FOOT ULCER: ICD-10-CM

## 2024-10-08 DIAGNOSIS — Z87.891 PERSONAL HISTORY OF NICOTINE DEPENDENCE: ICD-10-CM

## 2024-10-08 DIAGNOSIS — Z79.82 LONG TERM (CURRENT) USE OF ASPIRIN: ICD-10-CM

## 2024-10-08 DIAGNOSIS — Z80.8 FAMILY HISTORY OF MALIGNANT NEOPLASM OF OTHER ORGANS OR SYSTEMS: ICD-10-CM

## 2024-10-08 DIAGNOSIS — Z98.49 CATARACT EXTRACTION STATUS, UNSPECIFIED EYE: ICD-10-CM

## 2024-10-08 DIAGNOSIS — L97.526 NON-PRESSURE CHRONIC ULCER OF OTHER PART OF LEFT FOOT WITH BONE INVOLVEMENT WITHOUT EVIDENCE OF NECROSIS: ICD-10-CM

## 2024-10-08 DIAGNOSIS — Z83.3 FAMILY HISTORY OF DIABETES MELLITUS: ICD-10-CM

## 2024-10-08 DIAGNOSIS — E78.5 HYPERLIPIDEMIA, UNSPECIFIED: ICD-10-CM

## 2024-10-08 DIAGNOSIS — Z79.4 LONG TERM (CURRENT) USE OF INSULIN: ICD-10-CM

## 2024-10-10 ENCOUNTER — NON-APPOINTMENT (OUTPATIENT)
Age: 69
End: 2024-10-10

## 2024-10-12 LAB
CULTURE RESULTS: SIGNIFICANT CHANGE UP
SPECIMEN SOURCE: SIGNIFICANT CHANGE UP

## 2024-10-15 ENCOUNTER — APPOINTMENT (OUTPATIENT)
Dept: WOUND CARE | Facility: HOSPITAL | Age: 69
End: 2024-10-15

## 2024-10-15 PROCEDURE — 80048 BASIC METABOLIC PNL TOTAL CA: CPT

## 2024-10-15 PROCEDURE — 87102 FUNGUS ISOLATION CULTURE: CPT

## 2024-10-15 PROCEDURE — 82728 ASSAY OF FERRITIN: CPT

## 2024-10-15 PROCEDURE — 83550 IRON BINDING TEST: CPT

## 2024-10-15 PROCEDURE — 83735 ASSAY OF MAGNESIUM: CPT

## 2024-10-15 PROCEDURE — 87077 CULTURE AEROBIC IDENTIFY: CPT

## 2024-10-15 PROCEDURE — 96374 THER/PROPH/DIAG INJ IV PUSH: CPT

## 2024-10-15 PROCEDURE — 85045 AUTOMATED RETICULOCYTE COUNT: CPT

## 2024-10-15 PROCEDURE — 87040 BLOOD CULTURE FOR BACTERIA: CPT

## 2024-10-15 PROCEDURE — 85610 PROTHROMBIN TIME: CPT

## 2024-10-15 PROCEDURE — 85652 RBC SED RATE AUTOMATED: CPT

## 2024-10-15 PROCEDURE — 85027 COMPLETE CBC AUTOMATED: CPT

## 2024-10-15 PROCEDURE — 99285 EMERGENCY DEPT VISIT HI MDM: CPT

## 2024-10-15 PROCEDURE — 83615 LACTATE (LD) (LDH) ENZYME: CPT

## 2024-10-15 PROCEDURE — 73720 MRI LWR EXTREMITY W/O&W/DYE: CPT | Mod: MC

## 2024-10-15 PROCEDURE — 85025 COMPLETE CBC W/AUTO DIFF WBC: CPT

## 2024-10-15 PROCEDURE — 82962 GLUCOSE BLOOD TEST: CPT

## 2024-10-15 PROCEDURE — 73630 X-RAY EXAM OF FOOT: CPT

## 2024-10-15 PROCEDURE — 93005 ELECTROCARDIOGRAM TRACING: CPT

## 2024-10-15 PROCEDURE — 84436 ASSAY OF TOTAL THYROXINE: CPT

## 2024-10-15 PROCEDURE — 82272 OCCULT BLD FECES 1-3 TESTS: CPT

## 2024-10-15 PROCEDURE — A9579: CPT

## 2024-10-15 PROCEDURE — 76937 US GUIDE VASCULAR ACCESS: CPT

## 2024-10-15 PROCEDURE — 84100 ASSAY OF PHOSPHORUS: CPT

## 2024-10-15 PROCEDURE — 84443 ASSAY THYROID STIM HORMONE: CPT

## 2024-10-15 PROCEDURE — 80053 COMPREHEN METABOLIC PANEL: CPT

## 2024-10-15 PROCEDURE — 83036 HEMOGLOBIN GLYCOSYLATED A1C: CPT

## 2024-10-15 PROCEDURE — 88311 DECALCIFY TISSUE: CPT

## 2024-10-15 PROCEDURE — 83540 ASSAY OF IRON: CPT

## 2024-10-15 PROCEDURE — 36415 COLL VENOUS BLD VENIPUNCTURE: CPT

## 2024-10-15 PROCEDURE — 87186 SC STD MICRODIL/AGAR DIL: CPT

## 2024-10-15 PROCEDURE — C1751: CPT

## 2024-10-15 PROCEDURE — 86140 C-REACTIVE PROTEIN: CPT

## 2024-10-15 PROCEDURE — 87075 CULTR BACTERIA EXCEPT BLOOD: CPT

## 2024-10-15 PROCEDURE — 88304 TISSUE EXAM BY PATHOLOGIST: CPT

## 2024-10-15 PROCEDURE — 87070 CULTURE OTHR SPECIMN AEROBIC: CPT

## 2024-10-15 PROCEDURE — 36573 INSJ PICC RS&I 5 YR+: CPT

## 2024-10-15 PROCEDURE — 80061 LIPID PANEL: CPT

## 2024-10-21 ENCOUNTER — NON-APPOINTMENT (OUTPATIENT)
Age: 69
End: 2024-10-21

## 2024-10-21 ENCOUNTER — OUTPATIENT (OUTPATIENT)
Dept: OUTPATIENT SERVICES | Facility: HOSPITAL | Age: 69
LOS: 1 days | Discharge: HOME | End: 2024-10-21
Payer: MEDICARE

## 2024-10-21 ENCOUNTER — APPOINTMENT (OUTPATIENT)
Dept: WOUND CARE | Facility: HOSPITAL | Age: 69
End: 2024-10-21
Payer: MEDICARE

## 2024-10-21 VITALS
DIASTOLIC BLOOD PRESSURE: 73 MMHG | HEIGHT: 72 IN | WEIGHT: 165 LBS | TEMPERATURE: 99.4 F | OXYGEN SATURATION: 96 % | SYSTOLIC BLOOD PRESSURE: 127 MMHG | HEART RATE: 73 BPM | BODY MASS INDEX: 22.35 KG/M2 | RESPIRATION RATE: 18 BRPM

## 2024-10-21 DIAGNOSIS — E11.621 TYPE 2 DIABETES MELLITUS WITH FOOT ULCER: ICD-10-CM

## 2024-10-21 PROCEDURE — G0463: CPT

## 2024-10-21 PROCEDURE — 99213 OFFICE O/P EST LOW 20 MIN: CPT

## 2024-10-23 DIAGNOSIS — E11.621 TYPE 2 DIABETES MELLITUS WITH FOOT ULCER: ICD-10-CM

## 2024-10-23 DIAGNOSIS — Z87.891 PERSONAL HISTORY OF NICOTINE DEPENDENCE: ICD-10-CM

## 2024-10-23 DIAGNOSIS — Z80.8 FAMILY HISTORY OF MALIGNANT NEOPLASM OF OTHER ORGANS OR SYSTEMS: ICD-10-CM

## 2024-10-23 DIAGNOSIS — Z87.19 PERSONAL HISTORY OF OTHER DISEASES OF THE DIGESTIVE SYSTEM: ICD-10-CM

## 2024-10-23 DIAGNOSIS — Z79.84 LONG TERM (CURRENT) USE OF ORAL HYPOGLYCEMIC DRUGS: ICD-10-CM

## 2024-10-23 DIAGNOSIS — E78.5 HYPERLIPIDEMIA, UNSPECIFIED: ICD-10-CM

## 2024-10-23 DIAGNOSIS — L84 CORNS AND CALLOSITIES: ICD-10-CM

## 2024-10-23 DIAGNOSIS — Z83.3 FAMILY HISTORY OF DIABETES MELLITUS: ICD-10-CM

## 2024-10-23 DIAGNOSIS — Z79.4 LONG TERM (CURRENT) USE OF INSULIN: ICD-10-CM

## 2024-10-23 DIAGNOSIS — Z98.49 CATARACT EXTRACTION STATUS, UNSPECIFIED EYE: ICD-10-CM

## 2024-10-23 DIAGNOSIS — L97.526 NON-PRESSURE CHRONIC ULCER OF OTHER PART OF LEFT FOOT WITH BONE INVOLVEMENT WITHOUT EVIDENCE OF NECROSIS: ICD-10-CM

## 2024-10-23 DIAGNOSIS — Z79.82 LONG TERM (CURRENT) USE OF ASPIRIN: ICD-10-CM

## 2024-10-23 DIAGNOSIS — Z79.899 OTHER LONG TERM (CURRENT) DRUG THERAPY: ICD-10-CM

## 2024-10-23 DIAGNOSIS — D64.9 ANEMIA, UNSPECIFIED: ICD-10-CM

## 2024-10-23 DIAGNOSIS — I10 ESSENTIAL (PRIMARY) HYPERTENSION: ICD-10-CM

## 2024-10-28 ENCOUNTER — APPOINTMENT (OUTPATIENT)
Dept: WOUND CARE | Facility: HOSPITAL | Age: 69
End: 2024-10-28
Payer: MEDICARE

## 2024-10-28 ENCOUNTER — OUTPATIENT (OUTPATIENT)
Dept: OUTPATIENT SERVICES | Facility: HOSPITAL | Age: 69
LOS: 1 days | Discharge: HOME | End: 2024-10-28
Payer: MEDICARE

## 2024-10-28 VITALS
DIASTOLIC BLOOD PRESSURE: 83 MMHG | SYSTOLIC BLOOD PRESSURE: 169 MMHG | OXYGEN SATURATION: 98 % | TEMPERATURE: 98.7 F | HEIGHT: 72 IN | RESPIRATION RATE: 18 BRPM | HEART RATE: 68 BPM | WEIGHT: 165 LBS | BODY MASS INDEX: 22.35 KG/M2

## 2024-10-28 DIAGNOSIS — E11.621 TYPE 2 DIABETES MELLITUS WITH FOOT ULCER: ICD-10-CM

## 2024-10-28 PROCEDURE — G0463: CPT

## 2024-10-28 PROCEDURE — 99213 OFFICE O/P EST LOW 20 MIN: CPT

## 2024-10-30 DIAGNOSIS — Z80.8 FAMILY HISTORY OF MALIGNANT NEOPLASM OF OTHER ORGANS OR SYSTEMS: ICD-10-CM

## 2024-10-30 DIAGNOSIS — E11.621 TYPE 2 DIABETES MELLITUS WITH FOOT ULCER: ICD-10-CM

## 2024-10-30 DIAGNOSIS — D64.9 ANEMIA, UNSPECIFIED: ICD-10-CM

## 2024-10-30 DIAGNOSIS — E78.5 HYPERLIPIDEMIA, UNSPECIFIED: ICD-10-CM

## 2024-10-30 DIAGNOSIS — Z87.19 PERSONAL HISTORY OF OTHER DISEASES OF THE DIGESTIVE SYSTEM: ICD-10-CM

## 2024-10-30 DIAGNOSIS — Z79.4 LONG TERM (CURRENT) USE OF INSULIN: ICD-10-CM

## 2024-10-30 DIAGNOSIS — Z87.891 PERSONAL HISTORY OF NICOTINE DEPENDENCE: ICD-10-CM

## 2024-10-30 DIAGNOSIS — Z98.49 CATARACT EXTRACTION STATUS, UNSPECIFIED EYE: ICD-10-CM

## 2024-10-30 DIAGNOSIS — L97.526 NON-PRESSURE CHRONIC ULCER OF OTHER PART OF LEFT FOOT WITH BONE INVOLVEMENT WITHOUT EVIDENCE OF NECROSIS: ICD-10-CM

## 2024-10-30 DIAGNOSIS — Z83.3 FAMILY HISTORY OF DIABETES MELLITUS: ICD-10-CM

## 2024-10-30 DIAGNOSIS — Z79.899 OTHER LONG TERM (CURRENT) DRUG THERAPY: ICD-10-CM

## 2024-10-30 DIAGNOSIS — I10 ESSENTIAL (PRIMARY) HYPERTENSION: ICD-10-CM

## 2024-10-30 DIAGNOSIS — L84 CORNS AND CALLOSITIES: ICD-10-CM

## 2024-10-30 DIAGNOSIS — Z79.84 LONG TERM (CURRENT) USE OF ORAL HYPOGLYCEMIC DRUGS: ICD-10-CM

## 2024-10-30 DIAGNOSIS — Z79.82 LONG TERM (CURRENT) USE OF ASPIRIN: ICD-10-CM

## 2024-11-04 ENCOUNTER — OUTPATIENT (OUTPATIENT)
Dept: OUTPATIENT SERVICES | Facility: HOSPITAL | Age: 69
LOS: 1 days | Discharge: HOME | End: 2024-11-04
Payer: MEDICARE

## 2024-11-04 ENCOUNTER — RESULT REVIEW (OUTPATIENT)
Age: 69
End: 2024-11-04

## 2024-11-04 ENCOUNTER — APPOINTMENT (OUTPATIENT)
Dept: WOUND CARE | Facility: HOSPITAL | Age: 69
End: 2024-11-04
Payer: MEDICARE

## 2024-11-04 VITALS
RESPIRATION RATE: 18 BRPM | WEIGHT: 165 LBS | OXYGEN SATURATION: 98 % | BODY MASS INDEX: 22.35 KG/M2 | HEART RATE: 68 BPM | HEIGHT: 72 IN | TEMPERATURE: 98.6 F | DIASTOLIC BLOOD PRESSURE: 56 MMHG | SYSTOLIC BLOOD PRESSURE: 165 MMHG

## 2024-11-04 DIAGNOSIS — L97.509 TYPE 2 DIABETES MELLITUS WITH FOOT ULCER: ICD-10-CM

## 2024-11-04 DIAGNOSIS — L97.526 NON-PRESSURE CHRONIC ULCER OF OTHER PART OF LEFT FOOT WITH BONE INVOLVEMENT WITHOUT EVIDENCE OF NECROSIS: ICD-10-CM

## 2024-11-04 DIAGNOSIS — E11.621 TYPE 2 DIABETES MELLITUS WITH FOOT ULCER: ICD-10-CM

## 2024-11-04 DIAGNOSIS — L84 CORNS AND CALLOSITIES: ICD-10-CM

## 2024-11-04 PROCEDURE — G0463: CPT

## 2024-11-04 PROCEDURE — 99213 OFFICE O/P EST LOW 20 MIN: CPT

## 2024-11-04 PROCEDURE — 73630 X-RAY EXAM OF FOOT: CPT | Mod: 26,LT

## 2024-11-04 PROCEDURE — 73630 X-RAY EXAM OF FOOT: CPT

## 2024-11-05 DIAGNOSIS — Z80.8 FAMILY HISTORY OF MALIGNANT NEOPLASM OF OTHER ORGANS OR SYSTEMS: ICD-10-CM

## 2024-11-05 DIAGNOSIS — L97.526 NON-PRESSURE CHRONIC ULCER OF OTHER PART OF LEFT FOOT WITH BONE INVOLVEMENT WITHOUT EVIDENCE OF NECROSIS: ICD-10-CM

## 2024-11-05 DIAGNOSIS — I10 ESSENTIAL (PRIMARY) HYPERTENSION: ICD-10-CM

## 2024-11-05 DIAGNOSIS — M86.9 OSTEOMYELITIS, UNSPECIFIED: ICD-10-CM

## 2024-11-05 DIAGNOSIS — Z79.4 LONG TERM (CURRENT) USE OF INSULIN: ICD-10-CM

## 2024-11-05 DIAGNOSIS — E78.5 HYPERLIPIDEMIA, UNSPECIFIED: ICD-10-CM

## 2024-11-05 DIAGNOSIS — Z79.82 LONG TERM (CURRENT) USE OF ASPIRIN: ICD-10-CM

## 2024-11-05 DIAGNOSIS — Z83.3 FAMILY HISTORY OF DIABETES MELLITUS: ICD-10-CM

## 2024-11-05 DIAGNOSIS — L84 CORNS AND CALLOSITIES: ICD-10-CM

## 2024-11-05 DIAGNOSIS — Z87.891 PERSONAL HISTORY OF NICOTINE DEPENDENCE: ICD-10-CM

## 2024-11-05 DIAGNOSIS — Z98.49 CATARACT EXTRACTION STATUS, UNSPECIFIED EYE: ICD-10-CM

## 2024-11-05 DIAGNOSIS — D64.9 ANEMIA, UNSPECIFIED: ICD-10-CM

## 2024-11-05 DIAGNOSIS — Z79.84 LONG TERM (CURRENT) USE OF ORAL HYPOGLYCEMIC DRUGS: ICD-10-CM

## 2024-11-05 DIAGNOSIS — Z79.899 OTHER LONG TERM (CURRENT) DRUG THERAPY: ICD-10-CM

## 2024-11-05 DIAGNOSIS — E11.69 TYPE 2 DIABETES MELLITUS WITH OTHER SPECIFIED COMPLICATION: ICD-10-CM

## 2024-11-05 DIAGNOSIS — Z87.19 PERSONAL HISTORY OF OTHER DISEASES OF THE DIGESTIVE SYSTEM: ICD-10-CM

## 2024-11-11 ENCOUNTER — RX RENEWAL (OUTPATIENT)
Age: 69
End: 2024-11-11

## 2024-11-11 RX ORDER — OMEGA-3/DHA/EPA/FISH OIL 300-1000MG
45 CAPSULE ORAL
Qty: 30 | Refills: 2 | Status: ACTIVE | COMMUNITY
Start: 2024-11-11 | End: 1900-01-01

## 2024-11-13 NOTE — ASSESSMENT
[FreeTextEntry1] : Lung mass: REpeat CT scans stable. Following with [facility]. Requested records. \par HTN, HLD: BP controlled. Check lipids. Continue atorvastatin, ramipril. \par DM2: On metformin. Check a1c, microalb. \par Thyroid nodule: US stable. Requested records from [facility]. \par HCM: Check labs, EKG. Will discuss results. \par  Yes

## 2024-11-20 ENCOUNTER — OUTPATIENT (OUTPATIENT)
Dept: OUTPATIENT SERVICES | Facility: HOSPITAL | Age: 69
LOS: 1 days | Discharge: HOME | End: 2024-11-20
Payer: MEDICARE

## 2024-11-20 ENCOUNTER — APPOINTMENT (OUTPATIENT)
Dept: WOUND CARE | Facility: HOSPITAL | Age: 69
End: 2024-11-20
Payer: MEDICARE

## 2024-11-20 VITALS
DIASTOLIC BLOOD PRESSURE: 81 MMHG | RESPIRATION RATE: 18 BRPM | BODY MASS INDEX: 22.35 KG/M2 | OXYGEN SATURATION: 96 % | WEIGHT: 165 LBS | HEART RATE: 73 BPM | HEIGHT: 72 IN | TEMPERATURE: 97.6 F | SYSTOLIC BLOOD PRESSURE: 156 MMHG

## 2024-11-20 DIAGNOSIS — L97.509 TYPE 2 DIABETES MELLITUS WITH FOOT ULCER: ICD-10-CM

## 2024-11-20 DIAGNOSIS — L84 CORNS AND CALLOSITIES: ICD-10-CM

## 2024-11-20 DIAGNOSIS — L97.526 NON-PRESSURE CHRONIC ULCER OF OTHER PART OF LEFT FOOT WITH BONE INVOLVEMENT WITHOUT EVIDENCE OF NECROSIS: ICD-10-CM

## 2024-11-20 DIAGNOSIS — E11.621 TYPE 2 DIABETES MELLITUS WITH FOOT ULCER: ICD-10-CM

## 2024-11-20 PROCEDURE — G0463: CPT

## 2024-11-20 PROCEDURE — 99213 OFFICE O/P EST LOW 20 MIN: CPT

## 2024-11-22 DIAGNOSIS — M86.9 OSTEOMYELITIS, UNSPECIFIED: ICD-10-CM

## 2024-11-22 DIAGNOSIS — I10 ESSENTIAL (PRIMARY) HYPERTENSION: ICD-10-CM

## 2024-11-22 DIAGNOSIS — Z98.49 CATARACT EXTRACTION STATUS, UNSPECIFIED EYE: ICD-10-CM

## 2024-11-22 DIAGNOSIS — Z87.891 PERSONAL HISTORY OF NICOTINE DEPENDENCE: ICD-10-CM

## 2024-11-22 DIAGNOSIS — Z79.4 LONG TERM (CURRENT) USE OF INSULIN: ICD-10-CM

## 2024-11-22 DIAGNOSIS — Z79.899 OTHER LONG TERM (CURRENT) DRUG THERAPY: ICD-10-CM

## 2024-11-22 DIAGNOSIS — L97.526 NON-PRESSURE CHRONIC ULCER OF OTHER PART OF LEFT FOOT WITH BONE INVOLVEMENT WITHOUT EVIDENCE OF NECROSIS: ICD-10-CM

## 2024-11-22 DIAGNOSIS — Z83.3 FAMILY HISTORY OF DIABETES MELLITUS: ICD-10-CM

## 2024-11-22 DIAGNOSIS — Z79.82 LONG TERM (CURRENT) USE OF ASPIRIN: ICD-10-CM

## 2024-11-22 DIAGNOSIS — Z87.19 PERSONAL HISTORY OF OTHER DISEASES OF THE DIGESTIVE SYSTEM: ICD-10-CM

## 2024-11-22 DIAGNOSIS — Z79.84 LONG TERM (CURRENT) USE OF ORAL HYPOGLYCEMIC DRUGS: ICD-10-CM

## 2024-11-22 DIAGNOSIS — E11.69 TYPE 2 DIABETES MELLITUS WITH OTHER SPECIFIED COMPLICATION: ICD-10-CM

## 2024-11-22 DIAGNOSIS — Z80.8 FAMILY HISTORY OF MALIGNANT NEOPLASM OF OTHER ORGANS OR SYSTEMS: ICD-10-CM

## 2024-11-22 DIAGNOSIS — D64.9 ANEMIA, UNSPECIFIED: ICD-10-CM

## 2024-11-22 DIAGNOSIS — E78.5 HYPERLIPIDEMIA, UNSPECIFIED: ICD-10-CM

## 2024-12-13 ENCOUNTER — APPOINTMENT (OUTPATIENT)
Dept: WOUND CARE | Facility: HOSPITAL | Age: 69
End: 2024-12-13
Payer: MEDICARE

## 2024-12-13 ENCOUNTER — OUTPATIENT (OUTPATIENT)
Dept: OUTPATIENT SERVICES | Facility: HOSPITAL | Age: 69
LOS: 1 days | Discharge: HOME | End: 2024-12-13
Payer: MEDICARE

## 2024-12-13 VITALS
TEMPERATURE: 97.6 F | HEIGHT: 72 IN | HEART RATE: 69 BPM | WEIGHT: 165 LBS | DIASTOLIC BLOOD PRESSURE: 77 MMHG | BODY MASS INDEX: 22.35 KG/M2 | OXYGEN SATURATION: 97 % | RESPIRATION RATE: 18 BRPM | SYSTOLIC BLOOD PRESSURE: 131 MMHG

## 2024-12-13 DIAGNOSIS — E11.621 TYPE 2 DIABETES MELLITUS WITH FOOT ULCER: ICD-10-CM

## 2024-12-13 DIAGNOSIS — L97.509 TYPE 2 DIABETES MELLITUS WITH FOOT ULCER: ICD-10-CM

## 2024-12-13 DIAGNOSIS — L97.526 NON-PRESSURE CHRONIC ULCER OF OTHER PART OF LEFT FOOT WITH BONE INVOLVEMENT WITHOUT EVIDENCE OF NECROSIS: ICD-10-CM

## 2024-12-13 DIAGNOSIS — L84 CORNS AND CALLOSITIES: ICD-10-CM

## 2024-12-13 PROCEDURE — G0463: CPT

## 2024-12-13 PROCEDURE — 99213 OFFICE O/P EST LOW 20 MIN: CPT

## 2024-12-18 DIAGNOSIS — Z79.82 LONG TERM (CURRENT) USE OF ASPIRIN: ICD-10-CM

## 2024-12-18 DIAGNOSIS — Z79.899 OTHER LONG TERM (CURRENT) DRUG THERAPY: ICD-10-CM

## 2024-12-18 DIAGNOSIS — E78.5 HYPERLIPIDEMIA, UNSPECIFIED: ICD-10-CM

## 2024-12-18 DIAGNOSIS — Z98.49 CATARACT EXTRACTION STATUS, UNSPECIFIED EYE: ICD-10-CM

## 2024-12-18 DIAGNOSIS — Z87.891 PERSONAL HISTORY OF NICOTINE DEPENDENCE: ICD-10-CM

## 2024-12-18 DIAGNOSIS — Z83.3 FAMILY HISTORY OF DIABETES MELLITUS: ICD-10-CM

## 2024-12-18 DIAGNOSIS — E11.69 TYPE 2 DIABETES MELLITUS WITH OTHER SPECIFIED COMPLICATION: ICD-10-CM

## 2024-12-18 DIAGNOSIS — Z79.84 LONG TERM (CURRENT) USE OF ORAL HYPOGLYCEMIC DRUGS: ICD-10-CM

## 2024-12-18 DIAGNOSIS — Z79.4 LONG TERM (CURRENT) USE OF INSULIN: ICD-10-CM

## 2024-12-18 DIAGNOSIS — M86.9 OSTEOMYELITIS, UNSPECIFIED: ICD-10-CM

## 2024-12-18 DIAGNOSIS — Z80.8 FAMILY HISTORY OF MALIGNANT NEOPLASM OF OTHER ORGANS OR SYSTEMS: ICD-10-CM

## 2024-12-18 DIAGNOSIS — I10 ESSENTIAL (PRIMARY) HYPERTENSION: ICD-10-CM

## 2024-12-18 DIAGNOSIS — D64.9 ANEMIA, UNSPECIFIED: ICD-10-CM

## 2024-12-18 DIAGNOSIS — Z87.19 PERSONAL HISTORY OF OTHER DISEASES OF THE DIGESTIVE SYSTEM: ICD-10-CM

## 2024-12-18 DIAGNOSIS — L97.526 NON-PRESSURE CHRONIC ULCER OF OTHER PART OF LEFT FOOT WITH BONE INVOLVEMENT WITHOUT EVIDENCE OF NECROSIS: ICD-10-CM

## 2024-12-27 ENCOUNTER — OUTPATIENT (OUTPATIENT)
Dept: OUTPATIENT SERVICES | Facility: HOSPITAL | Age: 69
LOS: 1 days | Discharge: HOME | End: 2024-12-27
Payer: MEDICARE

## 2024-12-27 ENCOUNTER — APPOINTMENT (OUTPATIENT)
Dept: WOUND CARE | Facility: HOSPITAL | Age: 69
End: 2024-12-27
Payer: MEDICARE

## 2024-12-27 VITALS
OXYGEN SATURATION: 98 % | WEIGHT: 165 LBS | SYSTOLIC BLOOD PRESSURE: 163 MMHG | BODY MASS INDEX: 22.35 KG/M2 | DIASTOLIC BLOOD PRESSURE: 80 MMHG | TEMPERATURE: 98.9 F | HEART RATE: 73 BPM | RESPIRATION RATE: 18 BRPM | HEIGHT: 72 IN

## 2024-12-27 DIAGNOSIS — L84 CORNS AND CALLOSITIES: ICD-10-CM

## 2024-12-27 DIAGNOSIS — E11.621 TYPE 2 DIABETES MELLITUS WITH FOOT ULCER: ICD-10-CM

## 2024-12-27 DIAGNOSIS — E11.69 TYPE 2 DIABETES MELLITUS WITH OTHER SPECIFIED COMPLICATION: ICD-10-CM

## 2024-12-27 DIAGNOSIS — L97.526 NON-PRESSURE CHRONIC ULCER OF OTHER PART OF LEFT FOOT WITH BONE INVOLVEMENT WITHOUT EVIDENCE OF NECROSIS: ICD-10-CM

## 2024-12-27 PROCEDURE — G0463: CPT

## 2024-12-27 PROCEDURE — 99213 OFFICE O/P EST LOW 20 MIN: CPT

## 2024-12-29 DIAGNOSIS — Z80.8 FAMILY HISTORY OF MALIGNANT NEOPLASM OF OTHER ORGANS OR SYSTEMS: ICD-10-CM

## 2024-12-29 DIAGNOSIS — D64.9 ANEMIA, UNSPECIFIED: ICD-10-CM

## 2024-12-29 DIAGNOSIS — E78.5 HYPERLIPIDEMIA, UNSPECIFIED: ICD-10-CM

## 2024-12-29 DIAGNOSIS — Z87.19 PERSONAL HISTORY OF OTHER DISEASES OF THE DIGESTIVE SYSTEM: ICD-10-CM

## 2024-12-29 DIAGNOSIS — Z87.891 PERSONAL HISTORY OF NICOTINE DEPENDENCE: ICD-10-CM

## 2024-12-29 DIAGNOSIS — E11.69 TYPE 2 DIABETES MELLITUS WITH OTHER SPECIFIED COMPLICATION: ICD-10-CM

## 2024-12-29 DIAGNOSIS — Z79.84 LONG TERM (CURRENT) USE OF ORAL HYPOGLYCEMIC DRUGS: ICD-10-CM

## 2024-12-29 DIAGNOSIS — L97.526 NON-PRESSURE CHRONIC ULCER OF OTHER PART OF LEFT FOOT WITH BONE INVOLVEMENT WITHOUT EVIDENCE OF NECROSIS: ICD-10-CM

## 2024-12-29 DIAGNOSIS — Z79.899 OTHER LONG TERM (CURRENT) DRUG THERAPY: ICD-10-CM

## 2024-12-29 DIAGNOSIS — Z83.3 FAMILY HISTORY OF DIABETES MELLITUS: ICD-10-CM

## 2024-12-29 DIAGNOSIS — Z79.4 LONG TERM (CURRENT) USE OF INSULIN: ICD-10-CM

## 2024-12-29 DIAGNOSIS — I10 ESSENTIAL (PRIMARY) HYPERTENSION: ICD-10-CM

## 2024-12-29 DIAGNOSIS — Z79.82 LONG TERM (CURRENT) USE OF ASPIRIN: ICD-10-CM

## 2024-12-29 DIAGNOSIS — L84 CORNS AND CALLOSITIES: ICD-10-CM

## 2024-12-29 DIAGNOSIS — Z98.49 CATARACT EXTRACTION STATUS, UNSPECIFIED EYE: ICD-10-CM

## 2025-01-07 ENCOUNTER — RX RENEWAL (OUTPATIENT)
Age: 70
End: 2025-01-07

## 2025-01-13 ENCOUNTER — APPOINTMENT (OUTPATIENT)
Dept: WOUND CARE | Facility: HOSPITAL | Age: 70
End: 2025-01-13
Payer: MEDICARE

## 2025-01-13 ENCOUNTER — RESULT REVIEW (OUTPATIENT)
Age: 70
End: 2025-01-13

## 2025-01-13 ENCOUNTER — OUTPATIENT (OUTPATIENT)
Dept: OUTPATIENT SERVICES | Facility: HOSPITAL | Age: 70
LOS: 1 days | Discharge: HOME | End: 2025-01-13
Payer: MEDICARE

## 2025-01-13 VITALS
HEIGHT: 72 IN | BODY MASS INDEX: 22.35 KG/M2 | DIASTOLIC BLOOD PRESSURE: 76 MMHG | OXYGEN SATURATION: 97 % | RESPIRATION RATE: 20 BRPM | SYSTOLIC BLOOD PRESSURE: 122 MMHG | HEART RATE: 86 BPM | WEIGHT: 165 LBS | TEMPERATURE: 98.5 F

## 2025-01-13 DIAGNOSIS — M79.609 OTHER COMPLICATIONS OF AMPUTATION STUMP: ICD-10-CM

## 2025-01-13 DIAGNOSIS — L97.526 NON-PRESSURE CHRONIC ULCER OF OTHER PART OF LEFT FOOT WITH BONE INVOLVEMENT WITHOUT EVIDENCE OF NECROSIS: ICD-10-CM

## 2025-01-13 DIAGNOSIS — E11.69 TYPE 2 DIABETES MELLITUS WITH OTHER SPECIFIED COMPLICATION: ICD-10-CM

## 2025-01-13 DIAGNOSIS — E11.621 TYPE 2 DIABETES MELLITUS WITH FOOT ULCER: ICD-10-CM

## 2025-01-13 DIAGNOSIS — M86.9 OSTEOMYELITIS, UNSPECIFIED: ICD-10-CM

## 2025-01-13 DIAGNOSIS — T87.89 OTHER COMPLICATIONS OF AMPUTATION STUMP: ICD-10-CM

## 2025-01-13 DIAGNOSIS — L84 CORNS AND CALLOSITIES: ICD-10-CM

## 2025-01-13 PROCEDURE — 99213 OFFICE O/P EST LOW 20 MIN: CPT

## 2025-01-13 PROCEDURE — 73630 X-RAY EXAM OF FOOT: CPT

## 2025-01-13 PROCEDURE — 73630 X-RAY EXAM OF FOOT: CPT | Mod: 26,LT

## 2025-01-13 PROCEDURE — G0463: CPT

## 2025-01-14 DIAGNOSIS — L97.526 NON-PRESSURE CHRONIC ULCER OF OTHER PART OF LEFT FOOT WITH BONE INVOLVEMENT WITHOUT EVIDENCE OF NECROSIS: ICD-10-CM

## 2025-01-14 DIAGNOSIS — D64.9 ANEMIA, UNSPECIFIED: ICD-10-CM

## 2025-01-14 DIAGNOSIS — Z79.84 LONG TERM (CURRENT) USE OF ORAL HYPOGLYCEMIC DRUGS: ICD-10-CM

## 2025-01-14 DIAGNOSIS — Z98.49 CATARACT EXTRACTION STATUS, UNSPECIFIED EYE: ICD-10-CM

## 2025-01-14 DIAGNOSIS — Z87.19 PERSONAL HISTORY OF OTHER DISEASES OF THE DIGESTIVE SYSTEM: ICD-10-CM

## 2025-01-14 DIAGNOSIS — Z79.4 LONG TERM (CURRENT) USE OF INSULIN: ICD-10-CM

## 2025-01-14 DIAGNOSIS — E11.69 TYPE 2 DIABETES MELLITUS WITH OTHER SPECIFIED COMPLICATION: ICD-10-CM

## 2025-01-14 DIAGNOSIS — Z79.82 LONG TERM (CURRENT) USE OF ASPIRIN: ICD-10-CM

## 2025-01-14 DIAGNOSIS — E78.5 HYPERLIPIDEMIA, UNSPECIFIED: ICD-10-CM

## 2025-01-14 DIAGNOSIS — Z80.8 FAMILY HISTORY OF MALIGNANT NEOPLASM OF OTHER ORGANS OR SYSTEMS: ICD-10-CM

## 2025-01-14 DIAGNOSIS — Z83.3 FAMILY HISTORY OF DIABETES MELLITUS: ICD-10-CM

## 2025-01-14 DIAGNOSIS — M86.9 OSTEOMYELITIS, UNSPECIFIED: ICD-10-CM

## 2025-01-14 DIAGNOSIS — L84 CORNS AND CALLOSITIES: ICD-10-CM

## 2025-01-14 DIAGNOSIS — I10 ESSENTIAL (PRIMARY) HYPERTENSION: ICD-10-CM

## 2025-01-14 DIAGNOSIS — Z87.891 PERSONAL HISTORY OF NICOTINE DEPENDENCE: ICD-10-CM

## 2025-01-14 DIAGNOSIS — Z79.899 OTHER LONG TERM (CURRENT) DRUG THERAPY: ICD-10-CM

## 2025-01-14 PROBLEM — T87.89 STUMP PAIN: Status: ACTIVE | Noted: 2025-01-14

## 2025-01-27 ENCOUNTER — APPOINTMENT (OUTPATIENT)
Dept: WOUND CARE | Facility: HOSPITAL | Age: 70
End: 2025-01-27
Payer: MEDICARE

## 2025-01-27 ENCOUNTER — OUTPATIENT (OUTPATIENT)
Dept: OUTPATIENT SERVICES | Facility: HOSPITAL | Age: 70
LOS: 1 days | Discharge: HOME | End: 2025-01-27
Payer: MEDICARE

## 2025-01-27 VITALS
BODY MASS INDEX: 22.35 KG/M2 | DIASTOLIC BLOOD PRESSURE: 74 MMHG | OXYGEN SATURATION: 98 % | WEIGHT: 165 LBS | SYSTOLIC BLOOD PRESSURE: 116 MMHG | HEIGHT: 72 IN | TEMPERATURE: 98.5 F | HEART RATE: 93 BPM | RESPIRATION RATE: 20 BRPM

## 2025-01-27 DIAGNOSIS — M86.9 OSTEOMYELITIS, UNSPECIFIED: ICD-10-CM

## 2025-01-27 DIAGNOSIS — E11.621 TYPE 2 DIABETES MELLITUS WITH FOOT ULCER: ICD-10-CM

## 2025-01-27 DIAGNOSIS — M79.609 OTHER COMPLICATIONS OF AMPUTATION STUMP: ICD-10-CM

## 2025-01-27 DIAGNOSIS — L97.526 NON-PRESSURE CHRONIC ULCER OF OTHER PART OF LEFT FOOT WITH BONE INVOLVEMENT WITHOUT EVIDENCE OF NECROSIS: ICD-10-CM

## 2025-01-27 DIAGNOSIS — E11.69 TYPE 2 DIABETES MELLITUS WITH OTHER SPECIFIED COMPLICATION: ICD-10-CM

## 2025-01-27 DIAGNOSIS — T87.89 OTHER COMPLICATIONS OF AMPUTATION STUMP: ICD-10-CM

## 2025-01-27 PROCEDURE — 99213 OFFICE O/P EST LOW 20 MIN: CPT

## 2025-01-27 PROCEDURE — G0463: CPT

## 2025-01-28 DIAGNOSIS — Z80.9 FAMILY HISTORY OF MALIGNANT NEOPLASM, UNSPECIFIED: ICD-10-CM

## 2025-01-28 DIAGNOSIS — D64.9 ANEMIA, UNSPECIFIED: ICD-10-CM

## 2025-01-28 DIAGNOSIS — Z79.84 LONG TERM (CURRENT) USE OF ORAL HYPOGLYCEMIC DRUGS: ICD-10-CM

## 2025-01-28 DIAGNOSIS — E11.69 TYPE 2 DIABETES MELLITUS WITH OTHER SPECIFIED COMPLICATION: ICD-10-CM

## 2025-01-28 DIAGNOSIS — L97.526 NON-PRESSURE CHRONIC ULCER OF OTHER PART OF LEFT FOOT WITH BONE INVOLVEMENT WITHOUT EVIDENCE OF NECROSIS: ICD-10-CM

## 2025-01-28 DIAGNOSIS — Z79.82 LONG TERM (CURRENT) USE OF ASPIRIN: ICD-10-CM

## 2025-01-28 DIAGNOSIS — E78.5 HYPERLIPIDEMIA, UNSPECIFIED: ICD-10-CM

## 2025-01-28 DIAGNOSIS — Z87.891 PERSONAL HISTORY OF NICOTINE DEPENDENCE: ICD-10-CM

## 2025-01-28 DIAGNOSIS — Z83.3 FAMILY HISTORY OF DIABETES MELLITUS: ICD-10-CM

## 2025-01-28 DIAGNOSIS — Z98.49 CATARACT EXTRACTION STATUS, UNSPECIFIED EYE: ICD-10-CM

## 2025-01-28 DIAGNOSIS — Z79.899 OTHER LONG TERM (CURRENT) DRUG THERAPY: ICD-10-CM

## 2025-01-28 DIAGNOSIS — L84 CORNS AND CALLOSITIES: ICD-10-CM

## 2025-01-28 DIAGNOSIS — I10 ESSENTIAL (PRIMARY) HYPERTENSION: ICD-10-CM

## 2025-01-28 DIAGNOSIS — Z79.4 LONG TERM (CURRENT) USE OF INSULIN: ICD-10-CM

## 2025-02-06 ENCOUNTER — APPOINTMENT (OUTPATIENT)
Dept: WOUND CARE | Facility: HOSPITAL | Age: 70
End: 2025-02-06

## 2025-02-10 ENCOUNTER — OUTPATIENT (OUTPATIENT)
Dept: OUTPATIENT SERVICES | Facility: HOSPITAL | Age: 70
LOS: 1 days | Discharge: HOME | End: 2025-02-10
Payer: MEDICARE

## 2025-02-10 ENCOUNTER — APPOINTMENT (OUTPATIENT)
Dept: WOUND CARE | Facility: HOSPITAL | Age: 70
End: 2025-02-10
Payer: MEDICARE

## 2025-02-10 VITALS
TEMPERATURE: 98.1 F | SYSTOLIC BLOOD PRESSURE: 127 MMHG | HEART RATE: 72 BPM | RESPIRATION RATE: 18 BRPM | OXYGEN SATURATION: 96 % | BODY MASS INDEX: 22.35 KG/M2 | HEIGHT: 72 IN | WEIGHT: 165 LBS | DIASTOLIC BLOOD PRESSURE: 53 MMHG

## 2025-02-10 DIAGNOSIS — M86.9 OSTEOMYELITIS, UNSPECIFIED: ICD-10-CM

## 2025-02-10 DIAGNOSIS — E11.69 TYPE 2 DIABETES MELLITUS WITH OTHER SPECIFIED COMPLICATION: ICD-10-CM

## 2025-02-10 DIAGNOSIS — L84 CORNS AND CALLOSITIES: ICD-10-CM

## 2025-02-10 DIAGNOSIS — L97.526 NON-PRESSURE CHRONIC ULCER OF OTHER PART OF LEFT FOOT WITH BONE INVOLVEMENT WITHOUT EVIDENCE OF NECROSIS: ICD-10-CM

## 2025-02-10 DIAGNOSIS — E11.621 TYPE 2 DIABETES MELLITUS WITH FOOT ULCER: ICD-10-CM

## 2025-02-10 PROCEDURE — G0463: CPT

## 2025-02-10 PROCEDURE — 99213 OFFICE O/P EST LOW 20 MIN: CPT

## 2025-02-11 DIAGNOSIS — Z79.899 OTHER LONG TERM (CURRENT) DRUG THERAPY: ICD-10-CM

## 2025-02-11 DIAGNOSIS — Z79.84 LONG TERM (CURRENT) USE OF ORAL HYPOGLYCEMIC DRUGS: ICD-10-CM

## 2025-02-11 DIAGNOSIS — Z87.891 PERSONAL HISTORY OF NICOTINE DEPENDENCE: ICD-10-CM

## 2025-02-11 DIAGNOSIS — L97.526 NON-PRESSURE CHRONIC ULCER OF OTHER PART OF LEFT FOOT WITH BONE INVOLVEMENT WITHOUT EVIDENCE OF NECROSIS: ICD-10-CM

## 2025-02-11 DIAGNOSIS — E78.5 HYPERLIPIDEMIA, UNSPECIFIED: ICD-10-CM

## 2025-02-11 DIAGNOSIS — E11.69 TYPE 2 DIABETES MELLITUS WITH OTHER SPECIFIED COMPLICATION: ICD-10-CM

## 2025-02-11 DIAGNOSIS — D64.9 ANEMIA, UNSPECIFIED: ICD-10-CM

## 2025-02-11 DIAGNOSIS — I10 ESSENTIAL (PRIMARY) HYPERTENSION: ICD-10-CM

## 2025-02-11 DIAGNOSIS — Z98.49 CATARACT EXTRACTION STATUS, UNSPECIFIED EYE: ICD-10-CM

## 2025-02-11 DIAGNOSIS — Z79.82 LONG TERM (CURRENT) USE OF ASPIRIN: ICD-10-CM

## 2025-02-11 DIAGNOSIS — Z83.3 FAMILY HISTORY OF DIABETES MELLITUS: ICD-10-CM

## 2025-02-11 DIAGNOSIS — L84 CORNS AND CALLOSITIES: ICD-10-CM

## 2025-02-11 DIAGNOSIS — Z79.4 LONG TERM (CURRENT) USE OF INSULIN: ICD-10-CM

## 2025-02-11 DIAGNOSIS — Z80.8 FAMILY HISTORY OF MALIGNANT NEOPLASM OF OTHER ORGANS OR SYSTEMS: ICD-10-CM

## 2025-02-24 ENCOUNTER — OUTPATIENT (OUTPATIENT)
Dept: OUTPATIENT SERVICES | Facility: HOSPITAL | Age: 70
LOS: 1 days | Discharge: HOME | End: 2025-02-24
Payer: MEDICARE

## 2025-02-24 ENCOUNTER — APPOINTMENT (OUTPATIENT)
Dept: WOUND CARE | Facility: HOSPITAL | Age: 70
End: 2025-02-24
Payer: MEDICARE

## 2025-02-24 VITALS
SYSTOLIC BLOOD PRESSURE: 150 MMHG | WEIGHT: 165 LBS | OXYGEN SATURATION: 98 % | TEMPERATURE: 99 F | HEART RATE: 81 BPM | DIASTOLIC BLOOD PRESSURE: 79 MMHG | RESPIRATION RATE: 18 BRPM | HEIGHT: 72 IN | BODY MASS INDEX: 22.35 KG/M2

## 2025-02-24 DIAGNOSIS — L97.526 NON-PRESSURE CHRONIC ULCER OF OTHER PART OF LEFT FOOT WITH BONE INVOLVEMENT WITHOUT EVIDENCE OF NECROSIS: ICD-10-CM

## 2025-02-24 DIAGNOSIS — E11.69 TYPE 2 DIABETES MELLITUS WITH OTHER SPECIFIED COMPLICATION: ICD-10-CM

## 2025-02-24 DIAGNOSIS — L84 CORNS AND CALLOSITIES: ICD-10-CM

## 2025-02-24 DIAGNOSIS — M86.9 OSTEOMYELITIS, UNSPECIFIED: ICD-10-CM

## 2025-02-24 DIAGNOSIS — E11.621 TYPE 2 DIABETES MELLITUS WITH FOOT ULCER: ICD-10-CM

## 2025-02-24 PROCEDURE — G0463: CPT

## 2025-02-24 PROCEDURE — 99213 OFFICE O/P EST LOW 20 MIN: CPT

## 2025-02-25 DIAGNOSIS — L84 CORNS AND CALLOSITIES: ICD-10-CM

## 2025-02-25 DIAGNOSIS — Z80.8 FAMILY HISTORY OF MALIGNANT NEOPLASM OF OTHER ORGANS OR SYSTEMS: ICD-10-CM

## 2025-02-25 DIAGNOSIS — Z87.19 PERSONAL HISTORY OF OTHER DISEASES OF THE DIGESTIVE SYSTEM: ICD-10-CM

## 2025-02-25 DIAGNOSIS — E78.5 HYPERLIPIDEMIA, UNSPECIFIED: ICD-10-CM

## 2025-02-25 DIAGNOSIS — Z79.899 OTHER LONG TERM (CURRENT) DRUG THERAPY: ICD-10-CM

## 2025-02-25 DIAGNOSIS — Z79.82 LONG TERM (CURRENT) USE OF ASPIRIN: ICD-10-CM

## 2025-02-25 DIAGNOSIS — Z83.3 FAMILY HISTORY OF DIABETES MELLITUS: ICD-10-CM

## 2025-02-25 DIAGNOSIS — Z98.49 CATARACT EXTRACTION STATUS, UNSPECIFIED EYE: ICD-10-CM

## 2025-02-25 DIAGNOSIS — Z79.4 LONG TERM (CURRENT) USE OF INSULIN: ICD-10-CM

## 2025-02-25 DIAGNOSIS — Z79.84 LONG TERM (CURRENT) USE OF ORAL HYPOGLYCEMIC DRUGS: ICD-10-CM

## 2025-02-25 DIAGNOSIS — M86.9 OSTEOMYELITIS, UNSPECIFIED: ICD-10-CM

## 2025-02-25 DIAGNOSIS — E11.69 TYPE 2 DIABETES MELLITUS WITH OTHER SPECIFIED COMPLICATION: ICD-10-CM

## 2025-02-25 DIAGNOSIS — Z87.891 PERSONAL HISTORY OF NICOTINE DEPENDENCE: ICD-10-CM

## 2025-02-25 DIAGNOSIS — D64.9 ANEMIA, UNSPECIFIED: ICD-10-CM

## 2025-02-25 DIAGNOSIS — L97.526 NON-PRESSURE CHRONIC ULCER OF OTHER PART OF LEFT FOOT WITH BONE INVOLVEMENT WITHOUT EVIDENCE OF NECROSIS: ICD-10-CM

## 2025-02-25 DIAGNOSIS — I10 ESSENTIAL (PRIMARY) HYPERTENSION: ICD-10-CM

## 2025-07-08 ENCOUNTER — RX RENEWAL (OUTPATIENT)
Age: 70
End: 2025-07-08

## 2025-08-04 ENCOUNTER — NON-APPOINTMENT (OUTPATIENT)
Age: 70
End: 2025-08-04

## 2025-08-06 ENCOUNTER — APPOINTMENT (OUTPATIENT)
Dept: INTERNAL MEDICINE | Facility: CLINIC | Age: 70
End: 2025-08-06
Payer: MEDICARE

## 2025-08-06 VITALS
RESPIRATION RATE: 16 BRPM | HEIGHT: 72 IN | SYSTOLIC BLOOD PRESSURE: 118 MMHG | WEIGHT: 173 LBS | OXYGEN SATURATION: 96 % | HEART RATE: 82 BPM | BODY MASS INDEX: 23.43 KG/M2 | TEMPERATURE: 97.7 F | DIASTOLIC BLOOD PRESSURE: 62 MMHG

## 2025-08-06 DIAGNOSIS — E88.9 TYPE 2 DIABETES MELLITUS WITH OTHER SPECIFIED COMPLICATION: ICD-10-CM

## 2025-08-06 DIAGNOSIS — E11.9 TYPE 2 DIABETES MELLITUS W/OUT COMPLICATIONS: ICD-10-CM

## 2025-08-06 DIAGNOSIS — R22.9 LOCALIZED SWELLING, MASS AND LUMP, UNSPECIFIED: ICD-10-CM

## 2025-08-06 DIAGNOSIS — E78.5 HYPERLIPIDEMIA, UNSPECIFIED: ICD-10-CM

## 2025-08-06 DIAGNOSIS — I10 ESSENTIAL (PRIMARY) HYPERTENSION: ICD-10-CM

## 2025-08-06 DIAGNOSIS — E11.69 TYPE 2 DIABETES MELLITUS WITH OTHER SPECIFIED COMPLICATION: ICD-10-CM

## 2025-08-06 DIAGNOSIS — Z00.00 ENCOUNTER FOR GENERAL ADULT MEDICAL EXAMINATION W/OUT ABNORMAL FINDINGS: ICD-10-CM

## 2025-08-06 PROCEDURE — G0439: CPT

## 2025-08-07 LAB
ALBUMIN SERPL ELPH-MCNC: 4.6 G/DL
ALBUMIN, RANDOM URINE: <1.2 MG/DL
ALP BLD-CCNC: 90 U/L
ALT SERPL-CCNC: 20 U/L
ANION GAP SERPL CALC-SCNC: 16 MMOL/L
AST SERPL-CCNC: 27 U/L
BILIRUB SERPL-MCNC: 0.2 MG/DL
BUN SERPL-MCNC: 20 MG/DL
CALCIUM SERPL-MCNC: 9.8 MG/DL
CHLORIDE SERPL-SCNC: 97 MMOL/L
CHOLEST SERPL-MCNC: 164 MG/DL
CO2 SERPL-SCNC: 25 MMOL/L
CREAT SERPL-MCNC: 0.9 MG/DL
CREAT SPEC-SCNC: 125 MG/DL
EGFRCR SERPLBLD CKD-EPI 2021: 91 ML/MIN/1.73M2
ESTIMATED AVERAGE GLUCOSE: 174 MG/DL
FERRITIN SERPL-MCNC: 33 NG/ML
GLUCOSE SERPL-MCNC: 142 MG/DL
HBA1C MFR BLD HPLC: 7.7 %
HCT VFR BLD CALC: 38.5 %
HDLC SERPL-MCNC: 37 MG/DL
HGB BLD-MCNC: 12.2 G/DL
IRON SATN MFR SERPL: 15 %
IRON SERPL-MCNC: 62 UG/DL
LDLC SERPL-MCNC: 109 MG/DL
MCHC RBC-ENTMCNC: 26.3 PG
MCHC RBC-ENTMCNC: 31.7 G/DL
MCV RBC AUTO: 83 FL
MICROALBUMIN/CREAT 24H UR-RTO: NORMAL MG/G
NONHDLC SERPL-MCNC: 127 MG/DL
PLATELET # BLD AUTO: 324 K/UL
POTASSIUM SERPL-SCNC: 4.8 MMOL/L
PROT SERPL-MCNC: 7.6 G/DL
PSA SERPL-MCNC: 1.05 NG/ML
RBC # BLD: 4.64 M/UL
RBC # FLD: 14.6 %
SODIUM SERPL-SCNC: 138 MMOL/L
T3 SERPL-MCNC: 88 NG/DL
T4 FREE SERPL-MCNC: 1.1 NG/DL
TIBC SERPL-MCNC: 407 UG/DL
TRIGL SERPL-MCNC: 97 MG/DL
TSH SERPL-ACNC: 3.24 UIU/ML
UIBC SERPL-MCNC: 345 UG/DL
WBC # FLD AUTO: 9.53 K/UL

## (undated) DEVICE — TOURNIQUET CUFF 18" DUAL PORT SINGLE BLADDER LUER LOCK (BLACK)

## (undated) DEVICE — DRAIN RESERVOIR FOR JACKSON PRATT 100CC CARDINAL

## (undated) DEVICE — SOL IRR POUR H2O 1000ML

## (undated) DEVICE — WARMING BLANKET UPPER ADULT

## (undated) DEVICE — POLY TRAP ETRAP

## (undated) DEVICE — DRSG CURITY GAUZE SPONGE 4 X 4" 12-PLY

## (undated) DEVICE — SAW BLADE LINVATEC SAGITTAL MIC 9.5X25.5X0.4MM

## (undated) DEVICE — SNARE POLYP SENS SM 13MM 240CM

## (undated) DEVICE — BLADE SCALPEL SAFETYLOCK #15

## (undated) DEVICE — PLATE NESSY 170

## (undated) DEVICE — VENODYNE/SCD SLEEVE CALF LARGE

## (undated) DEVICE — PLV-SCD MACHINE: Type: DURABLE MEDICAL EQUIPMENT

## (undated) DEVICE — DRSG KLING 4"

## (undated) DEVICE — DRSG ACE BANDAGE 6"

## (undated) DEVICE — TUBING CAP SET ERBEFLO CLEVERCAP HYBRID CO2 FOR OLYMPUS SCOPES AND UCR

## (undated) DEVICE — PACKING GAUZE IODOFORM 0.5"

## (undated) DEVICE — NDL VITOSS BONE MARROW 8GAX6CM

## (undated) DEVICE — PACK LOWER EXTREMITY NS PLAINVI

## (undated) DEVICE — KIT ENDO PROCEDURE CUST W/VLV

## (undated) DEVICE — SNARE EXACTO COLD 9MMX230CM

## (undated) DEVICE — DRSG TELFA 3 X 8

## (undated) DEVICE — GLV 6.5 PROTEXIS (BLUE)

## (undated) DEVICE — CONTAINER FORMALIN BUFF 10% 60ML

## (undated) DEVICE — ENDOCUFF VISION SZ 2 LG GRN

## (undated) DEVICE — SOL IRR BAG NS 0.9% 3000ML

## (undated) DEVICE — VENODYNE/SCD SLEEVE CALF MEDIUM

## (undated) DEVICE — DRAPE C ARM UNIVERSAL

## (undated) DEVICE — FORCEP RADIAL JAW 4 240CM DISP

## (undated) DEVICE — DRAIN JACKSON PRATT 7MM FLAT FULL NO TROCAR

## (undated) DEVICE — STAPLER SKIN PROXIMATE

## (undated) DEVICE — BUR MICROAIRE CARBIDE OVAL 4MM 8 FLUTE

## (undated) DEVICE — GLV 7 PROTEXIS (WHITE)

## (undated) DEVICE — FORCEP RADIAL JAW 4 W NDL 2.4MM 2.8MM 240CM ORANGE DISP